# Patient Record
Sex: MALE | Race: WHITE | Employment: OTHER | URBAN - NONMETROPOLITAN AREA
[De-identification: names, ages, dates, MRNs, and addresses within clinical notes are randomized per-mention and may not be internally consistent; named-entity substitution may affect disease eponyms.]

---

## 2017-01-18 ENCOUNTER — OFFICE VISIT (OUTPATIENT)
Dept: CARDIOLOGY | Age: 62
End: 2017-01-18
Payer: MEDICARE

## 2017-01-18 VITALS
DIASTOLIC BLOOD PRESSURE: 74 MMHG | HEART RATE: 62 BPM | WEIGHT: 156 LBS | SYSTOLIC BLOOD PRESSURE: 138 MMHG | BODY MASS INDEX: 25.18 KG/M2

## 2017-01-18 DIAGNOSIS — R00.1 BRADYCARDIA: ICD-10-CM

## 2017-01-18 DIAGNOSIS — Z95.0 PACEMAKER: ICD-10-CM

## 2017-01-18 DIAGNOSIS — R06.02 SHORTNESS OF BREATH: Primary | ICD-10-CM

## 2017-01-18 PROCEDURE — 99213 OFFICE O/P EST LOW 20 MIN: CPT | Performed by: INTERNAL MEDICINE

## 2017-01-18 PROCEDURE — G8427 DOCREV CUR MEDS BY ELIG CLIN: HCPCS | Performed by: INTERNAL MEDICINE

## 2017-01-18 PROCEDURE — 4004F PT TOBACCO SCREEN RCVD TLK: CPT | Performed by: INTERNAL MEDICINE

## 2017-01-18 PROCEDURE — 93280 PM DEVICE PROGR EVAL DUAL: CPT | Performed by: INTERNAL MEDICINE

## 2017-01-18 PROCEDURE — 3017F COLORECTAL CA SCREEN DOC REV: CPT | Performed by: INTERNAL MEDICINE

## 2017-01-18 PROCEDURE — G8598 ASA/ANTIPLAT THER USED: HCPCS | Performed by: INTERNAL MEDICINE

## 2017-01-18 PROCEDURE — G8484 FLU IMMUNIZE NO ADMIN: HCPCS | Performed by: INTERNAL MEDICINE

## 2017-01-18 PROCEDURE — G8419 CALC BMI OUT NRM PARAM NOF/U: HCPCS | Performed by: INTERNAL MEDICINE

## 2017-04-20 ENCOUNTER — TELEPHONE (OUTPATIENT)
Dept: CARDIOLOGY | Age: 62
End: 2017-04-20

## 2017-05-03 DIAGNOSIS — I10 ESSENTIAL HYPERTENSION: ICD-10-CM

## 2017-05-03 RX ORDER — ATENOLOL 25 MG/1
TABLET ORAL
Qty: 30 TABLET | Refills: 5 | Status: SHIPPED | OUTPATIENT
Start: 2017-05-03 | End: 2017-11-13 | Stop reason: SDUPTHER

## 2017-05-11 DIAGNOSIS — R00.1 BRADYCARDIA: ICD-10-CM

## 2017-05-11 DIAGNOSIS — Z95.0 PACEMAKER: Primary | ICD-10-CM

## 2017-05-11 PROCEDURE — 93296 REM INTERROG EVL PM/IDS: CPT | Performed by: CLINICAL NURSE SPECIALIST

## 2017-05-11 PROCEDURE — 93294 REM INTERROG EVL PM/LDLS PM: CPT | Performed by: CLINICAL NURSE SPECIALIST

## 2017-07-06 ENCOUNTER — HOSPITAL ENCOUNTER (EMERGENCY)
Age: 62
Discharge: HOME | End: 2017-07-06
Attending: EMERGENCY MEDICINE
Payer: MEDICARE

## 2017-07-06 VITALS
RESPIRATION RATE: 18 BRPM | WEIGHT: 160 LBS | DIASTOLIC BLOOD PRESSURE: 90 MMHG | HEIGHT: 66 IN | TEMPERATURE: 97.9 F | SYSTOLIC BLOOD PRESSURE: 144 MMHG | HEART RATE: 65 BPM | BODY MASS INDEX: 25.71 KG/M2 | OXYGEN SATURATION: 98 %

## 2017-07-06 DIAGNOSIS — F12.10 MARIJUANA ABUSE: ICD-10-CM

## 2017-07-06 DIAGNOSIS — R55 NEAR SYNCOPE: Primary | ICD-10-CM

## 2017-07-06 DIAGNOSIS — F15.10 AMPHETAMINE ABUSE (HCC): ICD-10-CM

## 2017-07-06 LAB
ALBUMIN SERPL-MCNC: 3.7 G/DL (ref 3.5–5.2)
ALP BLD-CCNC: 34 U/L (ref 40–130)
ALT SERPL-CCNC: 102 U/L (ref 5–41)
AMPHETAMINE SCREEN, URINE: POSITIVE
ANION GAP SERPL CALCULATED.3IONS-SCNC: 16 MMOL/L (ref 7–19)
AST SERPL-CCNC: 147 U/L (ref 5–40)
BARBITURATE SCREEN URINE: NEGATIVE
BASOPHILS ABSOLUTE: 0 K/UL (ref 0–0.2)
BASOPHILS RELATIVE PERCENT: 0.7 % (ref 0–1)
BENZODIAZEPINE SCREEN, URINE: NEGATIVE
BILIRUB SERPL-MCNC: 0.4 MG/DL (ref 0.2–1.2)
BILIRUBIN URINE: NEGATIVE
BLOOD, URINE: NEGATIVE
BUN BLDV-MCNC: 17 MG/DL (ref 8–23)
CALCIUM SERPL-MCNC: 8.9 MG/DL (ref 8.8–10.2)
CANNABINOID SCREEN URINE: POSITIVE
CHLORIDE BLD-SCNC: 94 MMOL/L (ref 98–111)
CLARITY: CLEAR
CO2: 21 MMOL/L (ref 22–29)
COCAINE METABOLITE SCREEN URINE: NEGATIVE
COLOR: YELLOW
CREAT SERPL-MCNC: 0.6 MG/DL (ref 0.5–1.2)
EOSINOPHILS ABSOLUTE: 0.2 K/UL (ref 0–0.6)
EOSINOPHILS RELATIVE PERCENT: 3.6 % (ref 0–5)
ETHANOL: 21 MG/DL (ref 0–0.08)
GFR NON-AFRICAN AMERICAN: >60
GLUCOSE BLD-MCNC: 110 MG/DL (ref 74–109)
GLUCOSE URINE: NEGATIVE MG/DL
HCT VFR BLD CALC: 37.5 % (ref 42–52)
HEMOGLOBIN: 13.1 G/DL (ref 14–18)
INR BLD: 1 (ref 0.88–1.18)
KETONES, URINE: NEGATIVE MG/DL
LEUKOCYTE ESTERASE, URINE: NEGATIVE
LYMPHOCYTES ABSOLUTE: 1 K/UL (ref 1.1–4.5)
LYMPHOCYTES RELATIVE PERCENT: 24.4 % (ref 20–40)
Lab: ABNORMAL
MCH RBC QN AUTO: 32.3 PG (ref 27–31)
MCHC RBC AUTO-ENTMCNC: 34.9 G/DL (ref 33–37)
MCV RBC AUTO: 92.6 FL (ref 80–94)
MONOCYTES ABSOLUTE: 0.4 K/UL (ref 0–0.9)
MONOCYTES RELATIVE PERCENT: 10.2 % (ref 0–10)
NEUTROPHILS ABSOLUTE: 2.6 K/UL (ref 1.5–7.5)
NEUTROPHILS RELATIVE PERCENT: 60.9 % (ref 50–65)
NITRITE, URINE: NEGATIVE
OPIATE SCREEN URINE: NEGATIVE
PDW BLD-RTO: 12.3 % (ref 11.5–14.5)
PERFORMED ON: NORMAL
PH UA: 6.5
PLATELET # BLD: 137 K/UL (ref 130–400)
PMV BLD AUTO: 9.4 FL (ref 9.4–12.4)
POC TROPONIN I: 0 NG/ML (ref 0–0.08)
POTASSIUM SERPL-SCNC: 4.3 MMOL/L (ref 3.5–5)
PROTEIN UA: NEGATIVE MG/DL
PROTHROMBIN TIME: 13.1 SEC (ref 12–14.6)
RBC # BLD: 4.05 M/UL (ref 4.7–6.1)
SODIUM BLD-SCNC: 131 MMOL/L (ref 136–145)
SPECIFIC GRAVITY UA: 1.01
TOTAL CK: 87 U/L (ref 39–308)
TOTAL PROTEIN: 7.4 G/DL (ref 6.6–8.7)
UROBILINOGEN, URINE: 1 E.U./DL
WBC # BLD: 4.2 K/UL (ref 4.8–10.8)

## 2017-07-06 PROCEDURE — 80307 DRUG TEST PRSMV CHEM ANLYZR: CPT

## 2017-07-06 PROCEDURE — 80053 COMPREHEN METABOLIC PANEL: CPT

## 2017-07-06 PROCEDURE — G0480 DRUG TEST DEF 1-7 CLASSES: HCPCS

## 2017-07-06 PROCEDURE — 85025 COMPLETE CBC W/AUTO DIFF WBC: CPT

## 2017-07-06 PROCEDURE — 82550 ASSAY OF CK (CPK): CPT

## 2017-07-06 PROCEDURE — 93005 ELECTROCARDIOGRAM TRACING: CPT

## 2017-07-06 PROCEDURE — 84484 ASSAY OF TROPONIN QUANT: CPT

## 2017-07-06 PROCEDURE — 85610 PROTHROMBIN TIME: CPT

## 2017-07-06 PROCEDURE — 2580000003 HC RX 258: Performed by: EMERGENCY MEDICINE

## 2017-07-06 PROCEDURE — 36415 COLL VENOUS BLD VENIPUNCTURE: CPT

## 2017-07-06 PROCEDURE — 99282 EMERGENCY DEPT VISIT SF MDM: CPT | Performed by: EMERGENCY MEDICINE

## 2017-07-06 PROCEDURE — 99284 EMERGENCY DEPT VISIT MOD MDM: CPT

## 2017-07-06 RX ORDER — LISINOPRIL 20 MG/1
20 TABLET ORAL DAILY
COMMUNITY
End: 2017-07-20 | Stop reason: ALTCHOICE

## 2017-07-06 RX ORDER — 0.9 % SODIUM CHLORIDE 0.9 %
1000 INTRAVENOUS SOLUTION INTRAVENOUS ONCE
Status: COMPLETED | OUTPATIENT
Start: 2017-07-06 | End: 2017-07-06

## 2017-07-06 RX ADMIN — SODIUM CHLORIDE 1000 ML: 9 INJECTION, SOLUTION INTRAVENOUS at 21:04

## 2017-07-06 ASSESSMENT — ENCOUNTER SYMPTOMS
VOMITING: 0
DIARRHEA: 0
CHEST TIGHTNESS: 0
NAUSEA: 0
SHORTNESS OF BREATH: 0
WHEEZING: 0
SORE THROAT: 0
EYES NEGATIVE: 1
COUGH: 0
RHINORRHEA: 0
ABDOMINAL PAIN: 0

## 2017-07-10 LAB
EKG P AXIS: NORMAL DEGREES
EKG P-R INTERVAL: NORMAL MS
EKG Q-T INTERVAL: 402 MS
EKG QRS DURATION: 94 MS
EKG QTC CALCULATION (BAZETT): 410 MS
EKG T AXIS: 66 DEGREES

## 2017-07-20 ENCOUNTER — OFFICE VISIT (OUTPATIENT)
Dept: CARDIOLOGY | Age: 62
End: 2017-07-20
Payer: MEDICARE

## 2017-07-20 VITALS
WEIGHT: 152 LBS | DIASTOLIC BLOOD PRESSURE: 68 MMHG | SYSTOLIC BLOOD PRESSURE: 122 MMHG | HEIGHT: 63 IN | HEART RATE: 54 BPM | BODY MASS INDEX: 26.93 KG/M2

## 2017-07-20 DIAGNOSIS — Z72.0 TOBACCO ABUSE: ICD-10-CM

## 2017-07-20 DIAGNOSIS — Z95.0 PACEMAKER: Primary | ICD-10-CM

## 2017-07-20 DIAGNOSIS — R55 NEAR SYNCOPE: ICD-10-CM

## 2017-07-20 DIAGNOSIS — R00.1 BRADYCARDIA: ICD-10-CM

## 2017-07-20 PROCEDURE — G8598 ASA/ANTIPLAT THER USED: HCPCS | Performed by: CLINICAL NURSE SPECIALIST

## 2017-07-20 PROCEDURE — G8427 DOCREV CUR MEDS BY ELIG CLIN: HCPCS | Performed by: CLINICAL NURSE SPECIALIST

## 2017-07-20 PROCEDURE — 99213 OFFICE O/P EST LOW 20 MIN: CPT | Performed by: CLINICAL NURSE SPECIALIST

## 2017-07-20 PROCEDURE — 4004F PT TOBACCO SCREEN RCVD TLK: CPT | Performed by: CLINICAL NURSE SPECIALIST

## 2017-07-20 PROCEDURE — 93280 PM DEVICE PROGR EVAL DUAL: CPT | Performed by: CLINICAL NURSE SPECIALIST

## 2017-07-20 PROCEDURE — 3017F COLORECTAL CA SCREEN DOC REV: CPT | Performed by: CLINICAL NURSE SPECIALIST

## 2017-07-20 PROCEDURE — G8419 CALC BMI OUT NRM PARAM NOF/U: HCPCS | Performed by: CLINICAL NURSE SPECIALIST

## 2017-08-24 ENCOUNTER — HOSPITAL ENCOUNTER (OUTPATIENT)
Dept: GENERAL RADIOLOGY | Age: 62
Discharge: HOME | End: 2017-08-24
Payer: MEDICARE

## 2017-08-24 DIAGNOSIS — M25.512 LEFT SHOULDER PAIN, UNSPECIFIED CHRONICITY: ICD-10-CM

## 2017-08-24 PROCEDURE — 73030 X-RAY EXAM OF SHOULDER: CPT

## 2017-10-23 ENCOUNTER — TELEPHONE (OUTPATIENT)
Dept: CARDIOLOGY | Age: 62
End: 2017-10-23

## 2017-10-23 DIAGNOSIS — R00.1 BRADYCARDIA: ICD-10-CM

## 2017-10-23 DIAGNOSIS — Z95.0 PACEMAKER: Primary | ICD-10-CM

## 2017-10-23 PROCEDURE — 93296 REM INTERROG EVL PM/IDS: CPT | Performed by: NURSE PRACTITIONER

## 2017-10-23 PROCEDURE — 93294 REM INTERROG EVL PM/LDLS PM: CPT | Performed by: NURSE PRACTITIONER

## 2017-11-13 DIAGNOSIS — I10 ESSENTIAL HYPERTENSION: ICD-10-CM

## 2017-11-13 RX ORDER — ATENOLOL 25 MG/1
TABLET ORAL
Qty: 30 TABLET | Refills: 5 | Status: SHIPPED | OUTPATIENT
Start: 2017-11-13 | End: 2018-05-12 | Stop reason: SDUPTHER

## 2018-02-01 ENCOUNTER — OFFICE VISIT (OUTPATIENT)
Dept: CARDIOLOGY | Age: 63
End: 2018-02-01
Payer: MEDICARE

## 2018-02-01 VITALS
WEIGHT: 158 LBS | BODY MASS INDEX: 27.99 KG/M2 | HEART RATE: 65 BPM | DIASTOLIC BLOOD PRESSURE: 72 MMHG | SYSTOLIC BLOOD PRESSURE: 130 MMHG

## 2018-02-01 DIAGNOSIS — R00.1 BRADYCARDIA: ICD-10-CM

## 2018-02-01 DIAGNOSIS — I10 ESSENTIAL HYPERTENSION: Primary | ICD-10-CM

## 2018-02-01 DIAGNOSIS — Z95.0 PACEMAKER: ICD-10-CM

## 2018-02-01 PROCEDURE — 93280 PM DEVICE PROGR EVAL DUAL: CPT | Performed by: INTERNAL MEDICINE

## 2018-02-01 PROCEDURE — G8484 FLU IMMUNIZE NO ADMIN: HCPCS | Performed by: INTERNAL MEDICINE

## 2018-02-01 PROCEDURE — 4004F PT TOBACCO SCREEN RCVD TLK: CPT | Performed by: INTERNAL MEDICINE

## 2018-02-01 PROCEDURE — 99214 OFFICE O/P EST MOD 30 MIN: CPT | Performed by: INTERNAL MEDICINE

## 2018-02-01 PROCEDURE — G8598 ASA/ANTIPLAT THER USED: HCPCS | Performed by: INTERNAL MEDICINE

## 2018-02-01 PROCEDURE — 3017F COLORECTAL CA SCREEN DOC REV: CPT | Performed by: INTERNAL MEDICINE

## 2018-02-01 PROCEDURE — G8427 DOCREV CUR MEDS BY ELIG CLIN: HCPCS | Performed by: INTERNAL MEDICINE

## 2018-02-01 PROCEDURE — G8419 CALC BMI OUT NRM PARAM NOF/U: HCPCS | Performed by: INTERNAL MEDICINE

## 2018-02-01 RX ORDER — DICLOFENAC SODIUM 75 MG/1
75 TABLET, DELAYED RELEASE ORAL 2 TIMES DAILY
Refills: 2 | COMMUNITY
Start: 2018-01-25 | End: 2018-03-29

## 2018-02-14 ENCOUNTER — HOSPITAL ENCOUNTER (INPATIENT)
Age: 63
LOS: 3 days | Discharge: HOME | DRG: 247 | End: 2018-02-18
Attending: EMERGENCY MEDICINE | Admitting: INTERNAL MEDICINE
Payer: MEDICARE

## 2018-02-14 DIAGNOSIS — I21.4 NSTEMI (NON-ST ELEVATED MYOCARDIAL INFARCTION) (HCC): Primary | ICD-10-CM

## 2018-02-14 DIAGNOSIS — Z72.0 TOBACCO ABUSE: ICD-10-CM

## 2018-02-14 DIAGNOSIS — Z95.0 PACEMAKER: ICD-10-CM

## 2018-02-14 DIAGNOSIS — I20.0 UNSTABLE ANGINA (HCC): ICD-10-CM

## 2018-02-14 DIAGNOSIS — I10 ESSENTIAL HYPERTENSION: ICD-10-CM

## 2018-02-14 LAB
PERFORMED ON: ABNORMAL
POC TROPONIN I: 0.91 NG/ML (ref 0–0.08)

## 2018-02-14 PROCEDURE — 99285 EMERGENCY DEPT VISIT HI MDM: CPT

## 2018-02-14 RX ORDER — NITROGLYCERIN 20 MG/100ML
INJECTION INTRAVENOUS
Status: COMPLETED
Start: 2018-02-14 | End: 2018-02-15

## 2018-02-14 ASSESSMENT — PAIN DESCRIPTION - PAIN TYPE: TYPE: ACUTE PAIN

## 2018-02-14 ASSESSMENT — PAIN DESCRIPTION - LOCATION: LOCATION: CHEST

## 2018-02-14 ASSESSMENT — PAIN SCALES - GENERAL: PAINLEVEL_OUTOF10: 4

## 2018-02-14 ASSESSMENT — PAIN DESCRIPTION - FREQUENCY: FREQUENCY: CONTINUOUS

## 2018-02-15 ENCOUNTER — APPOINTMENT (OUTPATIENT)
Dept: GENERAL RADIOLOGY | Age: 63
DRG: 247 | End: 2018-02-15
Payer: MEDICARE

## 2018-02-15 PROBLEM — I25.10 CAD (CORONARY ARTERY DISEASE): Status: ACTIVE | Noted: 2018-02-15

## 2018-02-15 PROBLEM — I21.4 NSTEMI (NON-ST ELEVATED MYOCARDIAL INFARCTION) (HCC): Status: ACTIVE | Noted: 2018-02-15

## 2018-02-15 PROBLEM — I49.5 SINOATRIAL NODE DYSFUNCTION (HCC): Status: ACTIVE | Noted: 2018-02-15

## 2018-02-15 LAB
ALBUMIN SERPL-MCNC: 3.9 G/DL (ref 3.5–5.2)
ALP BLD-CCNC: 50 U/L (ref 40–130)
ALT SERPL-CCNC: 125 U/L (ref 5–41)
ANION GAP SERPL CALCULATED.3IONS-SCNC: 13 MMOL/L (ref 7–19)
APTT: 102 SEC (ref 26–36.2)
APTT: 71.1 SEC (ref 26–36.2)
APTT: >200 SEC (ref 26–36.2)
AST SERPL-CCNC: 180 U/L (ref 5–40)
BASOPHILS ABSOLUTE: 0 K/UL (ref 0–0.2)
BASOPHILS RELATIVE PERCENT: 1 % (ref 0–1)
BILIRUB SERPL-MCNC: 0.5 MG/DL (ref 0.2–1.2)
BUN BLDV-MCNC: 21 MG/DL (ref 8–23)
CALCIUM SERPL-MCNC: 9.1 MG/DL (ref 8.8–10.2)
CHLORIDE BLD-SCNC: 100 MMOL/L (ref 98–111)
CO2: 24 MMOL/L (ref 22–29)
CREAT SERPL-MCNC: 0.8 MG/DL (ref 0.5–1.2)
EKG P AXIS: 79 DEGREES
EKG P-R INTERVAL: 212 MS
EKG Q-T INTERVAL: 456 MS
EKG QRS DURATION: 86 MS
EKG QTC CALCULATION (BAZETT): 459 MS
EKG T AXIS: 98 DEGREES
EOSINOPHILS ABSOLUTE: 0.1 K/UL (ref 0–0.6)
EOSINOPHILS RELATIVE PERCENT: 3.5 % (ref 0–5)
GFR NON-AFRICAN AMERICAN: >60
GLUCOSE BLD-MCNC: 110 MG/DL (ref 74–109)
HCT VFR BLD CALC: 44 % (ref 42–52)
HEMOGLOBIN: 14.7 G/DL (ref 14–18)
INR BLD: 1.26 (ref 0.88–1.18)
LIPASE: 51 U/L (ref 13–60)
LYMPHOCYTES ABSOLUTE: 1.2 K/UL (ref 1.1–4.5)
LYMPHOCYTES RELATIVE PERCENT: 28.9 % (ref 20–40)
MCH RBC QN AUTO: 31.2 PG (ref 27–31)
MCHC RBC AUTO-ENTMCNC: 33.4 G/DL (ref 33–37)
MCV RBC AUTO: 93.4 FL (ref 80–94)
MONOCYTES ABSOLUTE: 0.5 K/UL (ref 0–0.9)
MONOCYTES RELATIVE PERCENT: 12.4 % (ref 0–10)
NEUTROPHILS ABSOLUTE: 2.2 K/UL (ref 1.5–7.5)
NEUTROPHILS RELATIVE PERCENT: 54 % (ref 50–65)
PDW BLD-RTO: 12.3 % (ref 11.5–14.5)
PLATELET # BLD: 120 K/UL (ref 130–400)
PMV BLD AUTO: 10.1 FL (ref 9.4–12.4)
POC ACT LR: 180 SEC
POTASSIUM SERPL-SCNC: 4.7 MMOL/L (ref 3.5–5)
PRO-BNP: 5567 PG/ML (ref 0–900)
PROTHROMBIN TIME: 15.7 SEC (ref 12–14.6)
RBC # BLD: 4.71 M/UL (ref 4.7–6.1)
SODIUM BLD-SCNC: 137 MMOL/L (ref 136–145)
TOTAL PROTEIN: 7.7 G/DL (ref 6.6–8.7)
TROPONIN: 0.29 NG/ML (ref 0–0.03)
TROPONIN: 0.35 NG/ML (ref 0–0.03)
TROPONIN: 0.41 NG/ML (ref 0–0.03)
TROPONIN: 0.45 NG/ML (ref 0–0.03)
TROPONIN: 0.48 NG/ML (ref 0–0.03)
TROPONIN: 0.49 NG/ML (ref 0–0.03)
WBC # BLD: 4 K/UL (ref 4.8–10.8)

## 2018-02-15 PROCEDURE — 2580000003 HC RX 258: Performed by: INTERNAL MEDICINE

## 2018-02-15 PROCEDURE — 93005 ELECTROCARDIOGRAM TRACING: CPT

## 2018-02-15 PROCEDURE — 93458 L HRT ARTERY/VENTRICLE ANGIO: CPT | Performed by: INTERNAL MEDICINE

## 2018-02-15 PROCEDURE — 92928 PRQ TCAT PLMT NTRAC ST 1 LES: CPT | Performed by: INTERNAL MEDICINE

## 2018-02-15 PROCEDURE — 2720000000 HC MISC SURG SUPPLY STERILE $0-50

## 2018-02-15 PROCEDURE — 027135Z DILATION OF CORONARY ARTERY, TWO ARTERIES WITH TWO DRUG-ELUTING INTRALUMINAL DEVICES, PERCUTANEOUS APPROACH: ICD-10-PCS | Performed by: INTERNAL MEDICINE

## 2018-02-15 PROCEDURE — 84484 ASSAY OF TROPONIN QUANT: CPT

## 2018-02-15 PROCEDURE — 92929 HC PRQ CARD STENT W/ANGIO ADDL: CPT | Performed by: INTERNAL MEDICINE

## 2018-02-15 PROCEDURE — 6370000000 HC RX 637 (ALT 250 FOR IP): Performed by: EMERGENCY MEDICINE

## 2018-02-15 PROCEDURE — 83690 ASSAY OF LIPASE: CPT

## 2018-02-15 PROCEDURE — 36415 COLL VENOUS BLD VENIPUNCTURE: CPT

## 2018-02-15 PROCEDURE — 6370000000 HC RX 637 (ALT 250 FOR IP)

## 2018-02-15 PROCEDURE — 85347 COAGULATION TIME ACTIVATED: CPT

## 2018-02-15 PROCEDURE — 87070 CULTURE OTHR SPECIMN AEROBIC: CPT

## 2018-02-15 PROCEDURE — C1760 CLOSURE DEV, VASC: HCPCS

## 2018-02-15 PROCEDURE — C1874 STENT, COATED/COV W/DEL SYS: HCPCS

## 2018-02-15 PROCEDURE — 85610 PROTHROMBIN TIME: CPT

## 2018-02-15 PROCEDURE — 83880 ASSAY OF NATRIURETIC PEPTIDE: CPT

## 2018-02-15 PROCEDURE — 71045 X-RAY EXAM CHEST 1 VIEW: CPT

## 2018-02-15 PROCEDURE — B41F1ZZ FLUOROSCOPY OF RIGHT LOWER EXTREMITY ARTERIES USING LOW OSMOLAR CONTRAST: ICD-10-PCS | Performed by: INTERNAL MEDICINE

## 2018-02-15 PROCEDURE — C1887 CATHETER, GUIDING: HCPCS

## 2018-02-15 PROCEDURE — B2151ZZ FLUOROSCOPY OF LEFT HEART USING LOW OSMOLAR CONTRAST: ICD-10-PCS | Performed by: INTERNAL MEDICINE

## 2018-02-15 PROCEDURE — 6370000000 HC RX 637 (ALT 250 FOR IP): Performed by: INTERNAL MEDICINE

## 2018-02-15 PROCEDURE — 85025 COMPLETE CBC W/AUTO DIFF WBC: CPT

## 2018-02-15 PROCEDURE — 6360000002 HC RX W HCPCS

## 2018-02-15 PROCEDURE — 2100000000 HC CCU R&B

## 2018-02-15 PROCEDURE — 99291 CRITICAL CARE FIRST HOUR: CPT | Performed by: EMERGENCY MEDICINE

## 2018-02-15 PROCEDURE — 2500000003 HC RX 250 WO HCPCS

## 2018-02-15 PROCEDURE — 2500000003 HC RX 250 WO HCPCS: Performed by: INTERNAL MEDICINE

## 2018-02-15 PROCEDURE — 92929 PR PRQ TRLUML CORONARY STENT W/ANGIO ADDL ART/BRNCH: CPT | Performed by: INTERNAL MEDICINE

## 2018-02-15 PROCEDURE — 85730 THROMBOPLASTIN TIME PARTIAL: CPT

## 2018-02-15 PROCEDURE — 4A023N7 MEASUREMENT OF CARDIAC SAMPLING AND PRESSURE, LEFT HEART, PERCUTANEOUS APPROACH: ICD-10-PCS | Performed by: INTERNAL MEDICINE

## 2018-02-15 PROCEDURE — 99223 1ST HOSP IP/OBS HIGH 75: CPT | Performed by: INTERNAL MEDICINE

## 2018-02-15 PROCEDURE — 80053 COMPREHEN METABOLIC PANEL: CPT

## 2018-02-15 PROCEDURE — 6360000002 HC RX W HCPCS: Performed by: INTERNAL MEDICINE

## 2018-02-15 PROCEDURE — 2720000001 HC MISC SURG SUPPLY STERILE $51-500

## 2018-02-15 PROCEDURE — 2709999900 HC NON-CHARGEABLE SUPPLY

## 2018-02-15 PROCEDURE — C1725 CATH, TRANSLUMIN NON-LASER: HCPCS

## 2018-02-15 PROCEDURE — C1769 GUIDE WIRE: HCPCS

## 2018-02-15 PROCEDURE — B2111ZZ FLUOROSCOPY OF MULTIPLE CORONARY ARTERIES USING LOW OSMOLAR CONTRAST: ICD-10-PCS | Performed by: INTERNAL MEDICINE

## 2018-02-15 RX ORDER — HEPARIN SODIUM 1000 [USP'U]/ML
30 INJECTION, SOLUTION INTRAVENOUS; SUBCUTANEOUS PRN
Status: DISCONTINUED | OUTPATIENT
Start: 2018-02-15 | End: 2018-02-15

## 2018-02-15 RX ORDER — ONDANSETRON 2 MG/ML
4 INJECTION INTRAMUSCULAR; INTRAVENOUS EVERY 6 HOURS PRN
Status: DISCONTINUED | OUTPATIENT
Start: 2018-02-15 | End: 2018-02-18 | Stop reason: HOSPADM

## 2018-02-15 RX ORDER — IBUPROFEN 400 MG/1
600 TABLET ORAL EVERY 8 HOURS SCHEDULED
Status: DISCONTINUED | OUTPATIENT
Start: 2018-02-15 | End: 2018-02-18 | Stop reason: HOSPADM

## 2018-02-15 RX ORDER — ATORVASTATIN CALCIUM 40 MG/1
40 TABLET, FILM COATED ORAL NIGHTLY
Status: DISCONTINUED | OUTPATIENT
Start: 2018-02-15 | End: 2018-02-18 | Stop reason: HOSPADM

## 2018-02-15 RX ORDER — ASPIRIN 81 MG/1
81 TABLET, CHEWABLE ORAL DAILY
Status: DISCONTINUED | OUTPATIENT
Start: 2018-02-15 | End: 2018-02-18 | Stop reason: HOSPADM

## 2018-02-15 RX ORDER — SODIUM CHLORIDE 0.9 % (FLUSH) 0.9 %
10 SYRINGE (ML) INJECTION PRN
Status: DISCONTINUED | OUTPATIENT
Start: 2018-02-15 | End: 2018-02-18 | Stop reason: HOSPADM

## 2018-02-15 RX ORDER — ASPIRIN 325 MG
325 TABLET ORAL DAILY
Status: DISCONTINUED | OUTPATIENT
Start: 2018-02-15 | End: 2018-02-15

## 2018-02-15 RX ORDER — PRASUGREL 10 MG/1
10 TABLET, FILM COATED ORAL DAILY
Status: DISCONTINUED | OUTPATIENT
Start: 2018-02-16 | End: 2018-02-16

## 2018-02-15 RX ORDER — HEPARIN SODIUM 10000 [USP'U]/100ML
12 INJECTION, SOLUTION INTRAVENOUS CONTINUOUS
Status: DISCONTINUED | OUTPATIENT
Start: 2018-02-15 | End: 2018-02-15

## 2018-02-15 RX ORDER — HEPARIN SODIUM 1000 [USP'U]/ML
60 INJECTION, SOLUTION INTRAVENOUS; SUBCUTANEOUS PRN
Status: DISCONTINUED | OUTPATIENT
Start: 2018-02-15 | End: 2018-02-15

## 2018-02-15 RX ORDER — ASPIRIN 81 MG/1
324 TABLET, CHEWABLE ORAL ONCE
Status: COMPLETED | OUTPATIENT
Start: 2018-02-15 | End: 2018-02-15

## 2018-02-15 RX ORDER — NITROGLYCERIN 20 MG/100ML
5 INJECTION INTRAVENOUS CONTINUOUS
Status: DISCONTINUED | OUTPATIENT
Start: 2018-02-15 | End: 2018-02-15

## 2018-02-15 RX ORDER — SODIUM CHLORIDE 9 MG/ML
INJECTION, SOLUTION INTRAVENOUS CONTINUOUS
Status: ACTIVE | OUTPATIENT
Start: 2018-02-15 | End: 2018-02-15

## 2018-02-15 RX ORDER — ACETAMINOPHEN 325 MG/1
650 TABLET ORAL EVERY 4 HOURS PRN
Status: DISCONTINUED | OUTPATIENT
Start: 2018-02-15 | End: 2018-02-18 | Stop reason: HOSPADM

## 2018-02-15 RX ORDER — SODIUM CHLORIDE 0.9 % (FLUSH) 0.9 %
10 SYRINGE (ML) INJECTION EVERY 12 HOURS SCHEDULED
Status: DISCONTINUED | OUTPATIENT
Start: 2018-02-15 | End: 2018-02-18 | Stop reason: HOSPADM

## 2018-02-15 RX ORDER — HEPARIN SODIUM 5000 [USP'U]/ML
INJECTION, SOLUTION INTRAVENOUS; SUBCUTANEOUS
Status: COMPLETED
Start: 2018-02-15 | End: 2018-02-15

## 2018-02-15 RX ORDER — DICLOFENAC SODIUM 75 MG/1
75 TABLET, DELAYED RELEASE ORAL 2 TIMES DAILY
Status: DISCONTINUED | OUTPATIENT
Start: 2018-02-15 | End: 2018-02-15 | Stop reason: CLARIF

## 2018-02-15 RX ORDER — NITROGLYCERIN 0.4 MG/1
0.4 TABLET SUBLINGUAL EVERY 5 MIN PRN
Status: DISCONTINUED | OUTPATIENT
Start: 2018-02-15 | End: 2018-02-15

## 2018-02-15 RX ORDER — ATENOLOL 25 MG/1
25 TABLET ORAL DAILY
Status: DISCONTINUED | OUTPATIENT
Start: 2018-02-15 | End: 2018-02-18 | Stop reason: HOSPADM

## 2018-02-15 RX ADMIN — NITROGLYCERIN 5 MCG/MIN: 20 INJECTION INTRAVENOUS at 01:17

## 2018-02-15 RX ADMIN — HEPARIN SODIUM AND DEXTROSE 12 UNITS/KG/HR: 10000; 5 INJECTION INTRAVENOUS at 01:18

## 2018-02-15 RX ADMIN — Medication 10 ML: at 20:01

## 2018-02-15 RX ADMIN — HEPARIN SODIUM 5000 UNITS: 5000 INJECTION INTRAVENOUS; SUBCUTANEOUS at 00:10

## 2018-02-15 RX ADMIN — ASPIRIN 81 MG CHEWABLE TABLET 324 MG: 81 TABLET CHEWABLE at 00:06

## 2018-02-15 RX ADMIN — ATENOLOL 25 MG: 25 TABLET ORAL at 19:59

## 2018-02-15 RX ADMIN — ATORVASTATIN CALCIUM 40 MG: 40 TABLET, FILM COATED ORAL at 19:59

## 2018-02-15 RX ADMIN — IBUPROFEN 600 MG: 400 TABLET, FILM COATED ORAL at 19:59

## 2018-02-15 RX ADMIN — NITROGLYCERIN 50000 MCG: 20 INJECTION INTRAVENOUS at 00:11

## 2018-02-15 ASSESSMENT — ENCOUNTER SYMPTOMS
SHORTNESS OF BREATH: 1
NAUSEA: 0
DIARRHEA: 0
COUGH: 0
VOMITING: 0
ABDOMINAL PAIN: 0

## 2018-02-15 ASSESSMENT — PAIN DESCRIPTION - PAIN TYPE: TYPE: CHRONIC PAIN

## 2018-02-15 ASSESSMENT — PAIN SCALES - GENERAL
PAINLEVEL_OUTOF10: 1
PAINLEVEL_OUTOF10: 0

## 2018-02-15 ASSESSMENT — PAIN DESCRIPTION - LOCATION: LOCATION: SHOULDER

## 2018-02-15 NOTE — ED PROVIDER NOTES
(!) 145/91 97.6 °F (36.4 °C) Oral 61 18 96 % 5' 6\" (1.676 m) 158 lb (71.7 kg)       Physical Exam   Constitutional: He is oriented to person, place, and time. He appears well-developed and well-nourished. No distress. Thin no acute distress laying in bed talking normally   HENT:   Head: Normocephalic and atraumatic. Eyes: Pupils are equal, round, and reactive to light. Neck: Normal range of motion. Neck supple. Cardiovascular: Normal rate, regular rhythm and normal heart sounds. Pulmonary/Chest: Effort normal and breath sounds normal. No respiratory distress. He has no wheezes. Pacer L upper chest   Abdominal: Soft. Bowel sounds are normal. He exhibits no distension. There is no tenderness. There is no rebound. Musculoskeletal: Normal range of motion. He exhibits no edema or tenderness. Neurological: He is alert and oriented to person, place, and time. No cranial nerve deficit or sensory deficit. GCS eye subscore is 4. GCS verbal subscore is 5. GCS motor subscore is 6. Skin: Skin is warm and dry. He is not diaphoretic. Many tattoos, psoriatic arthritis of the hands as well as rash of psoriasis on the arms especially. Psychiatric: He has a normal mood and affect. His behavior is normal.   Nursing note and vitals reviewed. DIAGNOSTIC RESULTS     EKG: All EKG's are interpreted by the Emergency Department Physician who either signs or Co-signs this chart in the absence of a cardiologist.  EKG #1 shows atrially paced rhythm at rate 63 there is biphasic T waves V2 through V5 with T-wave inversion 1 and aVL with pacer spikes with good capture. Prior EKG does not show any changes. EKG #2 shows similar pattern. With biphasic waves in V2 through V5 concerning for [facility].       RADIOLOGY:   Non-plain film images such as CT, Ultrasound and MRI are read by the radiologist. Plain radiographic images are visualized and preliminarily interpreted by the emergency physician with the below

## 2018-02-15 NOTE — PLAN OF CARE
Problem: Discharge Planning:  Goal: Participates in care planning  Participates in care planning  Outcome: Ongoing    Goal: Discharged to appropriate level of care  Discharged to appropriate level of care  Outcome: Ongoing      Problem: Tissue Perfusion - Cardiopulmonary, Altered:  Goal: Absence of angina  Absence of angina  Outcome: Ongoing    Goal: Hemodynamic stability will improve  Hemodynamic stability will improve  Outcome: Met This Shift

## 2018-02-15 NOTE — H&P
[facility]      History and Physical       Date of Admission:  2/14/2018 11:48 PM    Date of Initially Being Seen / Consultation:  2/15/18    Cardiologist:  Cleve Covarrubias MD     Cardiology Attending: [facility] Attending: ARIN     PCP:  JEFF Post    Reason for Consultation or Admission / Chief Complaint:  Chest discomfort    SUBJECTIVE AND HISTORY OF PRESENT ILLNESS:    Source of the history:  Patient, family, previous inpatient and outpatient records in [place] [facility]    Faisal Dawson is a 58 y.o. male who presents to [facility] ED / CCU with symptoms / signs / problem or diagnosis of chest discomfort. The symptoms began last night. The chest discomfort began at rest and lasted several hours. It was worse with activity. There was partial relief with rest.  The chest discomfort was described as pressure, fullness and tightness. He reports it as indigestion. It was not crushing. It was it located in the central chest with radiation to the back, throat and left shoulder. It was described as mild. There were no associated manifestations such as nausea, vomiting, diaphoresis, presyncope, syncope, dizzyness, weakness, cold sweats, or shortness of air. When being examined this morning, he has had no symptoms of exertional chest discomfort, unusual or change in shortness of air, presyncope or syncope.         Family present:  yes      CARDIAC RISK PROFILE:    Risk Factor Yes / No / Unknown       Gender Male   Cigarette Use Yes:    Family History of Cardiovascular Disease Yes: diabetes, heart disease, high blood pressure, diabetes, stroke    Diabetes Mellitus no   Hypercholesteremia no   Hypertension no          Cardiac Specific Problems:    Specialty Problems        Cardiology Problems    Carotid artery stenosis        CAD (coronary artery disease)        Sinoatrial node dysfunction (HCC)                PRIOR CARDIAC PROBLEM LIST  (IF APPLICABLE):    6/0/4720  Echo  Normal LVFX  2/23/2013  Echo  Normal LVFX, RVSP 55 mg  2/15/18 ACS ALETHA RISK Score 3 (angina, + troponin, cigarette, family history of CAD, ASA), AUC indication 3, AUC score 8  2/15/18  Cath  90% proximal LAD, 2.25 x 38 SP, diag 2.5 x 15 SP, anterior lateral hypokinesis, EF 45%    3/5/13  loop recorder  5/22/2013  Dual chamber PPM    Past Medical History:    Past Medical History:   Diagnosis Date    Bradycardia     3/5/13  loop recorder    Carotid artery stenosis     Hypoglycemia     Near syncope     Osteoarthritis     Pacemaker 5/22/2013    loop recorder removed    S/P carotid endarterectomy     right internal carotid    Sinoatrial node dysfunction (HCC) 2/15/2018    3/5/13  loop recorder 5/22/2013  Dual chamber PPM    Tobacco abuse          Past Surgical History:    Past Surgical History:   Procedure Laterality Date    PACEMAKER INSERTION  5/22/2013    VASCULAR SURGERY  2-27-13 [facility]    Right Carotid endarterectomy, eversion technique with completion duplex US         Home Medications:   Prior to Admission medications    Medication Sig Start Date End Date Taking? Authorizing Provider   diclofenac (VOLTAREN) 75 MG EC tablet 75 mg 2 times daily 1/25/18  Yes Historical Provider, MD   atenolol (TENORMIN) 25 MG tablet TAKE 1 TABLET BY MOUTH DAILY 11/13/17  Yes JEFF Chapa        Facility Administered Medications:    sodium chloride flush  10 mL Intravenous 2 times per day    aspirin  81 mg Oral Daily    aspirin  325 mg Oral Daily    [START ON 2/16/2018] influenza virus vaccine  0.5 mL Intramuscular Once       Allergies:  Codeine     Social History:       Social History     Social History    Marital status:      Spouse name: N/A    Number of children: N/A    Years of education: N/A     Occupational History    Not on file.      Social History Main Topics    Smoking status: Current Every Day Smoker     Packs/day: 1.50     Years: 50.00     Types: Cigarettes    Smokeless tobacco: Former User

## 2018-02-16 LAB
BASOPHILS ABSOLUTE: 0 K/UL (ref 0–0.2)
BASOPHILS RELATIVE PERCENT: 0.4 % (ref 0–1)
CHOLESTEROL, TOTAL: 131 MG/DL (ref 160–199)
EOSINOPHILS ABSOLUTE: 0.1 K/UL (ref 0–0.6)
EOSINOPHILS RELATIVE PERCENT: 2 % (ref 0–5)
HCT VFR BLD CALC: 35.1 % (ref 42–52)
HDLC SERPL-MCNC: 23 MG/DL (ref 55–121)
HEMOGLOBIN: 11.8 G/DL (ref 14–18)
LDL CHOLESTEROL CALCULATED: 68 MG/DL
LYMPHOCYTES ABSOLUTE: 1.3 K/UL (ref 1.1–4.5)
LYMPHOCYTES RELATIVE PERCENT: 28.5 % (ref 20–40)
MCH RBC QN AUTO: 31.5 PG (ref 27–31)
MCHC RBC AUTO-ENTMCNC: 33.6 G/DL (ref 33–37)
MCV RBC AUTO: 93.6 FL (ref 80–94)
MONOCYTES ABSOLUTE: 0.5 K/UL (ref 0–0.9)
MONOCYTES RELATIVE PERCENT: 9.9 % (ref 0–10)
MRSA CULTURE ONLY: ABNORMAL
MRSA CULTURE ONLY: ABNORMAL
NEUTROPHILS ABSOLUTE: 2.7 K/UL (ref 1.5–7.5)
NEUTROPHILS RELATIVE PERCENT: 58.8 % (ref 50–65)
ORGANISM: ABNORMAL
PDW BLD-RTO: 12.3 % (ref 11.5–14.5)
PLATELET # BLD: 105 K/UL (ref 130–400)
PMV BLD AUTO: 10.5 FL (ref 9.4–12.4)
RBC # BLD: 3.75 M/UL (ref 4.7–6.1)
TRIGL SERPL-MCNC: 201 MG/DL (ref 0–149)
TROPONIN: 0.52 NG/ML (ref 0–0.03)
WBC # BLD: 4.5 K/UL (ref 4.8–10.8)

## 2018-02-16 PROCEDURE — 6370000000 HC RX 637 (ALT 250 FOR IP): Performed by: INTERNAL MEDICINE

## 2018-02-16 PROCEDURE — 3E0234Z INTRODUCTION OF SERUM, TOXOID AND VACCINE INTO MUSCLE, PERCUTANEOUS APPROACH: ICD-10-PCS | Performed by: INTERNAL MEDICINE

## 2018-02-16 PROCEDURE — 2580000003 HC RX 258: Performed by: INTERNAL MEDICINE

## 2018-02-16 PROCEDURE — 80061 LIPID PANEL: CPT

## 2018-02-16 PROCEDURE — 85025 COMPLETE CBC W/AUTO DIFF WBC: CPT

## 2018-02-16 PROCEDURE — 93005 ELECTROCARDIOGRAM TRACING: CPT

## 2018-02-16 PROCEDURE — G0008 ADMIN INFLUENZA VIRUS VAC: HCPCS | Performed by: INTERNAL MEDICINE

## 2018-02-16 PROCEDURE — 36415 COLL VENOUS BLD VENIPUNCTURE: CPT

## 2018-02-16 PROCEDURE — 90686 IIV4 VACC NO PRSV 0.5 ML IM: CPT | Performed by: INTERNAL MEDICINE

## 2018-02-16 PROCEDURE — 84484 ASSAY OF TROPONIN QUANT: CPT

## 2018-02-16 PROCEDURE — 99232 SBSQ HOSP IP/OBS MODERATE 35: CPT | Performed by: INTERNAL MEDICINE

## 2018-02-16 PROCEDURE — 6360000002 HC RX W HCPCS: Performed by: INTERNAL MEDICINE

## 2018-02-16 PROCEDURE — 2100000000 HC CCU R&B

## 2018-02-16 RX ORDER — LISINOPRIL 2.5 MG/1
2.5 TABLET ORAL DAILY
Status: DISCONTINUED | OUTPATIENT
Start: 2018-02-16 | End: 2018-02-18 | Stop reason: HOSPADM

## 2018-02-16 RX ORDER — ATORVASTATIN CALCIUM 40 MG/1
40 TABLET, FILM COATED ORAL NIGHTLY
Qty: 30 TABLET | Refills: 3 | Status: CANCELLED | OUTPATIENT
Start: 2018-02-16

## 2018-02-16 RX ORDER — ASPIRIN 81 MG/1
81 TABLET, CHEWABLE ORAL DAILY
Qty: 30 TABLET | Refills: 3 | Status: CANCELLED | OUTPATIENT
Start: 2018-02-17

## 2018-02-16 RX ORDER — CLOPIDOGREL BISULFATE 75 MG/1
75 TABLET ORAL DAILY
Status: DISCONTINUED | OUTPATIENT
Start: 2018-02-16 | End: 2018-02-18 | Stop reason: HOSPADM

## 2018-02-16 RX ADMIN — ASPIRIN 81 MG CHEWABLE TABLET 81 MG: 81 TABLET CHEWABLE at 08:08

## 2018-02-16 RX ADMIN — ATORVASTATIN CALCIUM 40 MG: 40 TABLET, FILM COATED ORAL at 19:34

## 2018-02-16 RX ADMIN — LISINOPRIL 2.5 MG: 2.5 TABLET ORAL at 18:22

## 2018-02-16 RX ADMIN — Medication 10 ML: at 08:09

## 2018-02-16 RX ADMIN — ATENOLOL 25 MG: 25 TABLET ORAL at 08:08

## 2018-02-16 RX ADMIN — IBUPROFEN 600 MG: 400 TABLET, FILM COATED ORAL at 05:48

## 2018-02-16 RX ADMIN — INFLUENZA A VIRUS A/CALIFORNIA/7/2009 X-179A (H1N1) ANTIGEN (FORMALDEHYDE INACTIVATED), INFLUENZA A VIRUS A/TEXAS/50/2012 X-223A (H3N2) ANTIGEN (FORMALDEHYDE INACTIVATED), INFLUENZA B VIRUS B/MASSACHUSETTS/2/2012 BX-51B ANTIGEN (FORMALDEHYDE INACTIVATED), AND INFLUENZA B VIRUS B/BRISBANE/60/2008 ANTIGEN (FORMALDEHYDE INACTIVATED) 0.5 ML: 15; 15; 15; 15 INJECTION, SUSPENSION INTRAMUSCULAR at 15:50

## 2018-02-16 RX ADMIN — Medication 10 ML: at 19:34

## 2018-02-16 RX ADMIN — CLOPIDOGREL BISULFATE 75 MG: 75 TABLET ORAL at 18:22

## 2018-02-16 RX ADMIN — PRASUGREL 10 MG: 10 TABLET, FILM COATED ORAL at 08:08

## 2018-02-16 RX ADMIN — IBUPROFEN 600 MG: 400 TABLET, FILM COATED ORAL at 19:35

## 2018-02-16 ASSESSMENT — PAIN SCALES - GENERAL
PAINLEVEL_OUTOF10: 0
PAINLEVEL_OUTOF10: 3
PAINLEVEL_OUTOF10: 0

## 2018-02-16 ASSESSMENT — PAIN DESCRIPTION - LOCATION: LOCATION: HAND

## 2018-02-16 ASSESSMENT — PAIN DESCRIPTION - PAIN TYPE: TYPE: CHRONIC PAIN

## 2018-02-16 NOTE — CONSULTS
Client alert and oriented to all spheres with multiple family present. Post MI - Cardiac intervention home care instructions including heart cath access site care, limitations, S & S to monitor, walking instructions and diet were given and reviewed. Cardiac Rehab was reviewed and encouraged. The client and his family were encouraged to contact his cardiologist's office with any questions or concerns following discharge. Mr. Eveline Iqbal and his family were attentive, asked appropriate questions and verbalized their understanding.

## 2018-02-17 LAB
ANION GAP SERPL CALCULATED.3IONS-SCNC: 13 MMOL/L (ref 7–19)
BUN BLDV-MCNC: 17 MG/DL (ref 8–23)
CALCIUM SERPL-MCNC: 8.4 MG/DL (ref 8.8–10.2)
CHLORIDE BLD-SCNC: 98 MMOL/L (ref 98–111)
CO2: 22 MMOL/L (ref 22–29)
CREAT SERPL-MCNC: 0.6 MG/DL (ref 0.5–1.2)
EKG P AXIS: NORMAL DEGREES
EKG P-R INTERVAL: NORMAL MS
EKG Q-T INTERVAL: 426 MS
EKG QRS DURATION: 102 MS
EKG QTC CALCULATION (BAZETT): 431 MS
EKG T AXIS: 106 DEGREES
GFR NON-AFRICAN AMERICAN: >60
GLUCOSE BLD-MCNC: 96 MG/DL (ref 74–109)
HCT VFR BLD CALC: 34 % (ref 42–52)
HEMOGLOBIN: 11.4 G/DL (ref 14–18)
MCH RBC QN AUTO: 31.5 PG (ref 27–31)
MCHC RBC AUTO-ENTMCNC: 33.5 G/DL (ref 33–37)
MCV RBC AUTO: 93.9 FL (ref 80–94)
PDW BLD-RTO: 12.1 % (ref 11.5–14.5)
PLATELET # BLD: 102 K/UL (ref 130–400)
PMV BLD AUTO: 10.8 FL (ref 9.4–12.4)
POTASSIUM SERPL-SCNC: 4 MMOL/L (ref 3.5–5)
RBC # BLD: 3.62 M/UL (ref 4.7–6.1)
SODIUM BLD-SCNC: 133 MMOL/L (ref 136–145)
WBC # BLD: 4.5 K/UL (ref 4.8–10.8)

## 2018-02-17 PROCEDURE — 36415 COLL VENOUS BLD VENIPUNCTURE: CPT

## 2018-02-17 PROCEDURE — 2580000003 HC RX 258: Performed by: INTERNAL MEDICINE

## 2018-02-17 PROCEDURE — 80048 BASIC METABOLIC PNL TOTAL CA: CPT

## 2018-02-17 PROCEDURE — 85027 COMPLETE CBC AUTOMATED: CPT

## 2018-02-17 PROCEDURE — 6370000000 HC RX 637 (ALT 250 FOR IP): Performed by: INTERNAL MEDICINE

## 2018-02-17 PROCEDURE — 99231 SBSQ HOSP IP/OBS SF/LOW 25: CPT | Performed by: INTERNAL MEDICINE

## 2018-02-17 PROCEDURE — 2100000000 HC CCU R&B

## 2018-02-17 RX ADMIN — ATORVASTATIN CALCIUM 40 MG: 40 TABLET, FILM COATED ORAL at 21:28

## 2018-02-17 RX ADMIN — CLOPIDOGREL BISULFATE 75 MG: 75 TABLET ORAL at 09:00

## 2018-02-17 RX ADMIN — ASPIRIN 81 MG CHEWABLE TABLET 81 MG: 81 TABLET CHEWABLE at 09:00

## 2018-02-17 RX ADMIN — ATENOLOL 25 MG: 25 TABLET ORAL at 09:00

## 2018-02-17 RX ADMIN — LISINOPRIL 2.5 MG: 2.5 TABLET ORAL at 09:00

## 2018-02-17 RX ADMIN — IBUPROFEN 600 MG: 400 TABLET, FILM COATED ORAL at 05:16

## 2018-02-17 RX ADMIN — IBUPROFEN 600 MG: 400 TABLET, FILM COATED ORAL at 21:28

## 2018-02-17 RX ADMIN — Medication 10 ML: at 09:00

## 2018-02-17 RX ADMIN — IBUPROFEN 600 MG: 400 TABLET, FILM COATED ORAL at 15:06

## 2018-02-17 ASSESSMENT — PAIN SCALES - GENERAL
PAINLEVEL_OUTOF10: 0

## 2018-02-18 VITALS
WEIGHT: 198 LBS | BODY MASS INDEX: 31.82 KG/M2 | OXYGEN SATURATION: 95 % | RESPIRATION RATE: 21 BRPM | DIASTOLIC BLOOD PRESSURE: 80 MMHG | TEMPERATURE: 98.6 F | SYSTOLIC BLOOD PRESSURE: 147 MMHG | HEART RATE: 60 BPM | HEIGHT: 66 IN

## 2018-02-18 LAB
LV EF: 60 %
LVEF MODALITY: NORMAL

## 2018-02-18 PROCEDURE — 93306 TTE W/DOPPLER COMPLETE: CPT

## 2018-02-18 PROCEDURE — 2580000003 HC RX 258: Performed by: INTERNAL MEDICINE

## 2018-02-18 PROCEDURE — 99238 HOSP IP/OBS DSCHRG MGMT 30/<: CPT | Performed by: INTERNAL MEDICINE

## 2018-02-18 PROCEDURE — 6370000000 HC RX 637 (ALT 250 FOR IP): Performed by: INTERNAL MEDICINE

## 2018-02-18 RX ORDER — ASPIRIN 81 MG/1
81 TABLET, CHEWABLE ORAL DAILY
Qty: 30 TABLET | Refills: 3 | Status: SHIPPED | OUTPATIENT
Start: 2018-02-19

## 2018-02-18 RX ORDER — ATORVASTATIN CALCIUM 40 MG/1
40 TABLET, FILM COATED ORAL NIGHTLY
Qty: 30 TABLET | Refills: 3 | Status: SHIPPED | OUTPATIENT
Start: 2018-02-18 | End: 2018-06-19 | Stop reason: SDUPTHER

## 2018-02-18 RX ORDER — LISINOPRIL 2.5 MG/1
2.5 TABLET ORAL DAILY
Qty: 30 TABLET | Refills: 3 | Status: ON HOLD | OUTPATIENT
Start: 2018-02-18 | End: 2018-02-28

## 2018-02-18 RX ORDER — CLOPIDOGREL BISULFATE 75 MG/1
75 TABLET ORAL DAILY
Qty: 30 TABLET | Refills: 11 | Status: ON HOLD | OUTPATIENT
Start: 2018-02-18 | End: 2018-02-28 | Stop reason: HOSPADM

## 2018-02-18 RX ADMIN — IBUPROFEN 600 MG: 400 TABLET, FILM COATED ORAL at 05:52

## 2018-02-18 RX ADMIN — ATENOLOL 25 MG: 25 TABLET ORAL at 09:09

## 2018-02-18 RX ADMIN — Medication 10 ML: at 09:10

## 2018-02-18 RX ADMIN — ASPIRIN 81 MG CHEWABLE TABLET 81 MG: 81 TABLET CHEWABLE at 09:09

## 2018-02-18 RX ADMIN — CLOPIDOGREL BISULFATE 75 MG: 75 TABLET ORAL at 09:09

## 2018-02-18 RX ADMIN — LISINOPRIL 2.5 MG: 2.5 TABLET ORAL at 09:09

## 2018-02-18 ASSESSMENT — PAIN SCALES - GENERAL
PAINLEVEL_OUTOF10: 0
PAINLEVEL_OUTOF10: 0

## 2018-02-18 NOTE — DISCHARGE SUMMARY
[facility]  Discharge Summary      Date of Admission: 2/14/2018    Date of D/C: 2/18/2018    Admitting Diagnosis: ACS    D/C Diagnosis:     Primary - NSTEMI     Secondary - CAD     Attending Physician:  Dr. Vanita Walker    Discharging Physician:  Helio Sanderson MD    Procedures: SCA, PCI to LAD    Brief Presenting History:  Nori Brown is a 58 y.o. male who presents to [facility] ED / CCU with symptoms / signs / problem or diagnosis of chest discomfort. The symptoms began last night. The chest discomfort began at rest and lasted several hours. It was worse with activity. There was partial relief with rest.  The chest discomfort was described as pressure, fullness and tightness. He reports it as indigestion. It was not crushing. It was it located in the central chest with radiation to the back, throat and left shoulder. It was described as mild. There were no associated manifestations such as nausea, vomiting, diaphoresis, presyncope, syncope, dizzyness, weakness, cold sweats, or shortness of air. When being examined this morning, he has had no symptoms of exertional chest discomfort, unusual or change in shortness of air, presyncope or syncope. \"    Presenting Physical Exam:  \"GENERAL - well developed and well nourished, in mild amount of generalized distress  HEENT -  PERRLA, Hearing appears normal, conjunctiva and lids are normal, ears and nose appear normal  NECK - no thyromegaly, no JVD, trachea is in the midline  CARDIOVASCULAR - PMI is in the left mid line clavicular position, Normal S1 and S2 with a grade 1/6 systolic murmur. No S3 or S4    PULMONARY - No respiratory distress. scattered wheezes and rales.   Breath sounds in both  lung fields are Normal  ABDOMEN  - soft, non tender, no rebound, no hepatomegaly or splenomegaly  MUSCULOSKELETAL  - Prone/Supine, digitals and nails are without clubbing or cyanosis  EXTREMITIES - trace edema  NEUROLOGIC - cranial nerves, II-XII, are normal  SKIN - turgor is normal, no rash  PSYCHIATRIC - normal mood and affect, alert and orientated x 3, judgement and insight appear appropriate\"    Pertinent Presenting Lab: Trop 0.41    Hospital Course:  Patient was admitted to the hospital and taken to the cath lab where he received PCI to the prox/mid LAD with a 2.25 x 38mm SP and 2.5 x15 SP to D1 in a [place] like technique. He also had a hematoma at his access site, but did not require intervention. He was monitored in the CCU for several days, mainly due to PCU beds. He had a TTE as well. He was discharged on DAPT, beta-blocker, and stain F/u with Dr. Allen Knowles in 3-4 weeks. Condition on D/C:  Good    Disposition:  Home    D/C meds:     Medication List      START taking these medications    aspirin 81 MG chewable tablet  Take 1 tablet by mouth daily  Start taking on:  2/19/2018     atorvastatin 40 MG tablet  Commonly known as:  LIPITOR  Take 1 tablet by mouth nightly     clopidogrel 75 MG tablet  Commonly known as:  PLAVIX  Take 1 tablet by mouth daily     lisinopril 2.5 MG tablet  Commonly known as:  PRINIVIL;ZESTRIL  Take 1 tablet by mouth daily        CONTINUE taking these medications    atenolol 25 MG tablet  Commonly known as:  TENORMIN  TAKE 1 TABLET BY MOUTH DAILY     diclofenac 75 MG EC tablet  Commonly known as:  VOLTAREN           Where to Get Your Medications      These medications were sent to [address] [place] [address], [address] [place] [address]    Phone:  [phone]   · aspirin 81 MG chewable tablet  · atorvastatin 40 MG tablet  · clopidogrel 75 MG tablet  · lisinopril 2.5 MG tablet         D/C Instructions:     Diet: Cardiac     Activity: Ad alice, no restrictions     Follow-Up:  F/u with Feroz in 4 rahelks    Jaime Cameron MD, MSc  Director of the [facility]

## 2018-02-18 NOTE — PROGRESS NOTES
[facility] : 1955, Male, 58 y.o. History: This is a 71-year-old white male emitted with acute myocardial infarction    [Allergies/Contraindications:  Codeine]     Todays status: The patient grabs no chest pain shortness of breath or arrhythmia    Physical Examination  /68   Pulse 68   Temp 98.1 °F (36.7 °C) (Temporal)   Resp 23   Ht 5' 6\" (1.676 m)   Wt 197 lb (89.4 kg)   SpO2 95%   BMI 31.80 kg/m²     Respiratory -  the lungs are clear . Cardiovascular -  rhythm is regular  Abdominal -  Soft. Bowel sounds present. Nontender. Extremities -  no lower extremity edema is appreciated. Data  Recent Labs      18   2350   NA  137   K  4.7   CL  100   CO2  24   BUN  21   CREATININE  0.8   GLUCOSE  110*   CALCIUM  9.1   PROT  7.7   LABALBU  3.9   BILITOT  0.5   ALKPHOS  50   AST  180*   ALT  125*       Lab Results   Component Value Date    TROPONINI 0.52 ([facility]) 2018       Telemetry--sinus    Impression/Plan          Moved to progressive care this afternoon. We will recheck lab work.  We plan discharge on  if all is well      Reji Hull MD    [facility] [place]
Echo completed
No change in assessment. Pt remains sitting up in chair. Lunch tray given. No other needs.
No changes.
No changes. Pt has been up walking around room. Took a walk through the unit. Tolerated activity well. Sitting up in chair now.
No complaints of pain throughout night.
Received patient post cardiac cath with stents.  Right groin with pressure dressing
Received pt post cardiac cath with stents. Settled back in room and instructed to  Keep head on pillow and right leg straight.
Labs:   CBC:   Recent Labs      02/14/18   2350  02/16/18   0255   WBC  4.0*  4.5*   HGB  14.7  11.8*   HCT  44.0  35.1*   PLT  120*  105*     BMP: Recent Labs      02/14/18   2350   NA  137   K  4.7   CO2  24   BUN  21   CREATININE  0.8   LABGLOM  >60   GLUCOSE  110*     BNP: No results for input(s): BNP in the last 72 hours.   PT/INR:   Recent Labs      02/14/18   2350   PROTIME  15.7*   INR  1.26*     APTT:  Recent Labs      02/15/18   0645  02/15/18   1230   APTT  71.1*  102.0*     CARDIAC ENZYMES:  Recent Labs      02/15/18   1444  02/15/18   2032  02/16/18   0255   TROPONINI  0.48*  0.45*  0.52*     FASTING LIPID PANEL:  Lab Results   Component Value Date    HDL 23 02/16/2018    LDLDIRECT 105 02/23/2013    LDLCALC 68 02/16/2018    TRIG 201 02/16/2018     LIVER PROFILE:  Recent Labs      02/14/18   2350   AST  180*   ALT  125*   LABALBU  3.9       NURSE:  Nicole Muniz RN    See attached note

## 2018-02-19 LAB
LV EF: 45 %
LVEF MODALITY: NORMAL

## 2018-02-20 ENCOUNTER — TELEPHONE (OUTPATIENT)
Dept: CARDIOLOGY | Age: 63
End: 2018-02-20

## 2018-02-20 DIAGNOSIS — E87.8 ELECTROLYTE ABNORMALITY: Primary | ICD-10-CM

## 2018-02-20 NOTE — TELEPHONE ENCOUNTER
Called patient advised to come into office tomorrow at 8:30 and to have labs prior. Understanding voiced.

## 2018-02-21 ENCOUNTER — OFFICE VISIT (OUTPATIENT)
Dept: CARDIOLOGY | Age: 63
End: 2018-02-21
Payer: MEDICARE

## 2018-02-21 VITALS
BODY MASS INDEX: 25.39 KG/M2 | HEIGHT: 66 IN | DIASTOLIC BLOOD PRESSURE: 80 MMHG | WEIGHT: 158 LBS | HEART RATE: 68 BPM | SYSTOLIC BLOOD PRESSURE: 128 MMHG | TEMPERATURE: 96.3 F

## 2018-02-21 DIAGNOSIS — E78.2 MIXED HYPERLIPIDEMIA: ICD-10-CM

## 2018-02-21 DIAGNOSIS — I25.10 CORONARY ARTERY DISEASE INVOLVING NATIVE CORONARY ARTERY OF NATIVE HEART WITHOUT ANGINA PECTORIS: Primary | ICD-10-CM

## 2018-02-21 DIAGNOSIS — R25.2 LEG CRAMPS: ICD-10-CM

## 2018-02-21 DIAGNOSIS — F17.211 CIGARETTE NICOTINE DEPENDENCE IN REMISSION: ICD-10-CM

## 2018-02-21 DIAGNOSIS — S30.1XXD HEMATOMA OF GROIN, SUBSEQUENT ENCOUNTER: ICD-10-CM

## 2018-02-21 DIAGNOSIS — E87.8 ELECTROLYTE ABNORMALITY: ICD-10-CM

## 2018-02-21 LAB
ANION GAP SERPL CALCULATED.3IONS-SCNC: 12 MMOL/L (ref 7–19)
BUN BLDV-MCNC: 23 MG/DL (ref 8–23)
CALCIUM SERPL-MCNC: 9 MG/DL (ref 8.8–10.2)
CHLORIDE BLD-SCNC: 96 MMOL/L (ref 98–111)
CO2: 24 MMOL/L (ref 22–29)
CREAT SERPL-MCNC: 0.9 MG/DL (ref 0.5–1.2)
GFR NON-AFRICAN AMERICAN: >60
GLUCOSE BLD-MCNC: 109 MG/DL (ref 74–109)
POTASSIUM SERPL-SCNC: 4.4 MMOL/L (ref 3.5–5)
SODIUM BLD-SCNC: 132 MMOL/L (ref 136–145)

## 2018-02-21 PROCEDURE — 1111F DSCHRG MED/CURRENT MED MERGE: CPT | Performed by: CLINICAL NURSE SPECIALIST

## 2018-02-21 PROCEDURE — G8427 DOCREV CUR MEDS BY ELIG CLIN: HCPCS | Performed by: CLINICAL NURSE SPECIALIST

## 2018-02-21 PROCEDURE — 3017F COLORECTAL CA SCREEN DOC REV: CPT | Performed by: CLINICAL NURSE SPECIALIST

## 2018-02-21 PROCEDURE — 99213 OFFICE O/P EST LOW 20 MIN: CPT | Performed by: CLINICAL NURSE SPECIALIST

## 2018-02-21 PROCEDURE — G8484 FLU IMMUNIZE NO ADMIN: HCPCS | Performed by: CLINICAL NURSE SPECIALIST

## 2018-02-21 PROCEDURE — 4004F PT TOBACCO SCREEN RCVD TLK: CPT | Performed by: CLINICAL NURSE SPECIALIST

## 2018-02-21 PROCEDURE — G8598 ASA/ANTIPLAT THER USED: HCPCS | Performed by: CLINICAL NURSE SPECIALIST

## 2018-02-21 PROCEDURE — G8419 CALC BMI OUT NRM PARAM NOF/U: HCPCS | Performed by: CLINICAL NURSE SPECIALIST

## 2018-02-21 ASSESSMENT — ENCOUNTER SYMPTOMS
COUGH: 0
BLURRED VISION: 0
HEARTBURN: 0
VOMITING: 0
ORTHOPNEA: 0
NAUSEA: 0
SHORTNESS OF BREATH: 0

## 2018-02-21 NOTE — PATIENT INSTRUCTIONS
and medicines. These can increase your chances of quitting for good. · Be active. Get at least 30 minutes of exercise on most days of the week. Walking is a good choice. You also may want to do other activities, such as running, swimming, cycling, or playing tennis or team sports. · Eat heart-healthy foods. Eat more fruits and vegetables and less foods that contain saturated and trans fats. Limit alcohol, sodium, and sweets. · Stay at a healthy weight. Lose weight if you need to. · Manage other health problems such as diabetes, high blood pressure, and high cholesterol. · Manage stress. Stress can hurt your heart. To keep stress low, talk about your problems and feelings. Don't keep your feelings hidden. · If you have talked about it with your doctor, take a low-dose aspirin every day. Aspirin can help certain people lower their risk of a heart attack or stroke. But taking aspirin isn't right for everyone, because it can cause serious bleeding. Do not start taking daily aspirin unless your doctor knows about it. How is coronary artery disease treated? · Your doctor will suggest that you make lifestyle changes. For example, your doctor may ask you to eat healthy foods, quit smoking, lose extra weight, and be more active. · You will have to take medicines. · Your doctor may suggest a procedure to open narrowed or blocked arteries. This is called angioplasty. Or your doctor may suggest using healthy blood vessels to create detours around narrowed or blocked arteries. This is called bypass surgery. Follow-up care is a key part of your treatment and safety. Be sure to make and go to all appointments, and call your doctor if you are having problems. It's also a good idea to know your test results and keep a list of the medicines you take. Where can you learn more? Go to https://paz.[employer]. org and sign in to your [employer] account.  Enter [phone] in the My [employer] Database box to learn more about \"Learning About Coronary Artery Disease (CAD). \"     If you do not have an account, please click on the \"Sign Up Now\" link. Current as of: September 21, 2016  Content Version: 11.5  © 0108-2099 Healthwise, Incorporated. Care instructions adapted under license by [facility] ([facility]). If you have questions about a medical condition or this instruction, always ask your healthcare professional. [address] any warranty or liability for your use of this information.

## 2018-02-21 NOTE — PROGRESS NOTES
[facility], [address] [facility] [address] [facility] [place] [address], [address] [address]  Phone: [phone]  Fax: [phone]    OFFICE VISIT:  2018    Nick Zarate - : 1955    Reason For Visit:  Gelacio Chavarria is a 58 y.o. male who is here for hospital follow-up status post heart cath with complaints of cramping and right leg and hardening at right groin cath site    HPI  Patient called the office yesterday with complaints of hardness at cath site in his main complaint was a severe cramping episode in his right leg and calf area while walking which has now resolved. Patient did a BMP on the way up to the office today which is currently pending. Patient states he has had cramping in the legs in the past. He states he is probably somewhat dehydrated and has not been drinking enough fluids. His groin site has a hematoma. He was concerned about some hardness at the site. He denies any further chest pain, unusual dyspnea, orthopnea, PND, edema, or palpitations. He states he has not smoked since his hospital stay and plans to remain smoke-free. Patient had a non-STEMI and 2 drug-eluting stents placed     JEFF Silva is PCP.   Nick Zarate has the following history as recorded in [facility]:    Patient Active Problem List    Diagnosis Date Noted    Mixed hyperlipidemia 2018    Cigarette nicotine dependence in remission 2018    CAD (coronary artery disease) 02/15/2018    Sinoatrial node dysfunction ([address] 02/15/2018    Elevated troponin     Tobacco abuse     Osteoarthritis     Carotid artery stenosis      Past Medical History:   Diagnosis Date    Bradycardia     3/5/13  loop recorder    Carotid artery stenosis     Cigarette nicotine dependence in remission 2018    Hypoglycemia     Mixed hyperlipidemia 2018    Near syncope     Osteoarthritis     Pacemaker 2013    loop recorder removed    S/P carotid endarterectomy     right internal carotid    Sinoatrial node for falls and myalgias. C/o leg cramps   Skin: Negative for rash. Neurological: Negative for dizziness, sensory change, speech change and focal weakness. Endo/Heme/Allergies: Does not bruise/bleed easily. Psychiatric/Behavioral: Negative for depression. The patient is not nervous/anxious. Objective  Vital Signs - /80   Pulse 68   Temp 96.3 °F (35.7 °C) (Tympanic)   Ht 5' 6\" (1.676 m)   Wt 158 lb (71.7 kg)   BMI 25.50 kg/m²   Physical Exam   Constitutional: He is oriented to person, place, and time. He appears well-developed and well-nourished. HENT:   Head: Normocephalic and atraumatic. Eyes: Pupils are equal, round, and reactive to light. Right eye exhibits no discharge. Left eye exhibits no discharge. Neck: No JVD present. No tracheal deviation present. Cardiovascular: Normal rate, regular rhythm, normal heart sounds and intact distal pulses. Exam reveals no gallop and no friction rub. No murmur heard. No carotid bruit  Small knot, right groin  Right foot pedal pulses are palpable 2+, foot warm, capillary refill 3 seconds   Pulmonary/Chest: Effort normal and breath sounds normal. No respiratory distress. He has no wheezes. He has no rales. Abdominal: Soft. There is no tenderness. Musculoskeletal: He exhibits no edema. Normal gait and station   Neurological: He is alert and oriented to person, place, and time. No cranial nerve deficit. Skin: Skin is warm and dry. No rash noted. Hematoma right groin and right inner thigh. Area is soft   Psychiatric: He has a normal mood and affect. His behavior is normal. Judgment normal.   Nursing note and vitals reviewed. Assessment:    1. Coronary artery disease involving native coronary artery of native heart without angina pectoris     2. Hematoma of groin, subsequent encounter     3. Mixed hyperlipidemia     4. Leg cramps     5.  Cigarette nicotine dependence in remission       Patient is taking medications as

## 2018-02-26 ENCOUNTER — TELEPHONE (OUTPATIENT)
Dept: CARDIOLOGY | Age: 63
End: 2018-02-26

## 2018-02-26 ENCOUNTER — APPOINTMENT (OUTPATIENT)
Dept: GENERAL RADIOLOGY | Age: 63
End: 2018-02-26
Payer: MEDICARE

## 2018-02-26 ENCOUNTER — HOSPITAL ENCOUNTER (OUTPATIENT)
Age: 63
Setting detail: OBSERVATION
Discharge: HOME | End: 2018-02-28
Attending: EMERGENCY MEDICINE | Admitting: INTERNAL MEDICINE
Payer: MEDICARE

## 2018-02-26 DIAGNOSIS — I21.4 NSTEMI (NON-ST ELEVATED MYOCARDIAL INFARCTION) (HCC): Primary | ICD-10-CM

## 2018-02-26 PROBLEM — R07.9 CHEST PAIN: Status: ACTIVE | Noted: 2018-02-26

## 2018-02-26 LAB
ALBUMIN SERPL-MCNC: 3.5 G/DL (ref 3.5–5.2)
ALP BLD-CCNC: 42 U/L (ref 40–130)
ALT SERPL-CCNC: 65 U/L (ref 5–41)
ANION GAP SERPL CALCULATED.3IONS-SCNC: 13 MMOL/L (ref 7–19)
AST SERPL-CCNC: 94 U/L (ref 5–40)
BASOPHILS ABSOLUTE: 0 K/UL (ref 0–0.2)
BASOPHILS RELATIVE PERCENT: 0.7 % (ref 0–1)
BILIRUB SERPL-MCNC: 0.8 MG/DL (ref 0.2–1.2)
BUN BLDV-MCNC: 20 MG/DL (ref 8–23)
CALCIUM SERPL-MCNC: 8.9 MG/DL (ref 8.8–10.2)
CHLORIDE BLD-SCNC: 100 MMOL/L (ref 98–111)
CO2: 23 MMOL/L (ref 22–29)
CREAT SERPL-MCNC: 1.1 MG/DL (ref 0.5–1.2)
EOSINOPHILS ABSOLUTE: 0.3 K/UL (ref 0–0.6)
EOSINOPHILS RELATIVE PERCENT: 6.1 % (ref 0–5)
GFR NON-AFRICAN AMERICAN: >60
GLUCOSE BLD-MCNC: 117 MG/DL (ref 74–109)
HCT VFR BLD CALC: 34.2 % (ref 42–52)
HCT VFR BLD CALC: 37 % (ref 42–52)
HEMOGLOBIN: 12.3 G/DL (ref 14–18)
LYMPHOCYTES ABSOLUTE: 0.8 K/UL (ref 1.1–4.5)
LYMPHOCYTES RELATIVE PERCENT: 15 % (ref 20–40)
MCH RBC QN AUTO: 30.8 PG (ref 27–31)
MCHC RBC AUTO-ENTMCNC: 33.2 G/DL (ref 33–37)
MCV RBC AUTO: 92.7 FL (ref 80–94)
MONOCYTES ABSOLUTE: 0.7 K/UL (ref 0–0.9)
MONOCYTES RELATIVE PERCENT: 13.1 % (ref 0–10)
NEUTROPHILS ABSOLUTE: 3.5 K/UL (ref 1.5–7.5)
NEUTROPHILS RELATIVE PERCENT: 64.7 % (ref 50–65)
P2Y12 RESULT: 228 PRU (ref 194–418)
PDW BLD-RTO: 12.2 % (ref 11.5–14.5)
PERFORMED ON: ABNORMAL
PERFORMED ON: NORMAL
PLATELET # BLD: 206 K/UL (ref 130–400)
PLATELET # BLD: 211 K/UL (ref 130–400)
PMV BLD AUTO: 9.4 FL (ref 9.4–12.4)
POC TROPONIN I: 0.03 NG/ML (ref 0–0.08)
POC TROPONIN I: 0.1 NG/ML (ref 0–0.08)
POTASSIUM SERPL-SCNC: 3.8 MMOL/L (ref 3.5–5)
RBC # BLD: 3.99 M/UL (ref 4.7–6.1)
SODIUM BLD-SCNC: 136 MMOL/L (ref 136–145)
TOTAL PROTEIN: 7.8 G/DL (ref 6.6–8.7)
TROPONIN: 0.04 NG/ML (ref 0–0.03)
TROPONIN: 0.05 NG/ML (ref 0–0.03)
WBC # BLD: 5.4 K/UL (ref 4.8–10.8)

## 2018-02-26 PROCEDURE — 85014 HEMATOCRIT: CPT

## 2018-02-26 PROCEDURE — 36415 COLL VENOUS BLD VENIPUNCTURE: CPT

## 2018-02-26 PROCEDURE — G0378 HOSPITAL OBSERVATION PER HR: HCPCS

## 2018-02-26 PROCEDURE — 85576 BLOOD PLATELET AGGREGATION: CPT

## 2018-02-26 PROCEDURE — 80053 COMPREHEN METABOLIC PANEL: CPT

## 2018-02-26 PROCEDURE — 99219 PR INITIAL OBSERVATION CARE/DAY 50 MINUTES: CPT | Performed by: INTERNAL MEDICINE

## 2018-02-26 PROCEDURE — 85049 AUTOMATED PLATELET COUNT: CPT

## 2018-02-26 PROCEDURE — 6360000002 HC RX W HCPCS: Performed by: INTERNAL MEDICINE

## 2018-02-26 PROCEDURE — 84484 ASSAY OF TROPONIN QUANT: CPT

## 2018-02-26 PROCEDURE — 99285 EMERGENCY DEPT VISIT HI MDM: CPT | Performed by: EMERGENCY MEDICINE

## 2018-02-26 PROCEDURE — 2580000003 HC RX 258: Performed by: INTERNAL MEDICINE

## 2018-02-26 PROCEDURE — 6370000000 HC RX 637 (ALT 250 FOR IP): Performed by: NURSE PRACTITIONER

## 2018-02-26 PROCEDURE — 96372 THER/PROPH/DIAG INJ SC/IM: CPT

## 2018-02-26 PROCEDURE — 6370000000 HC RX 637 (ALT 250 FOR IP): Performed by: INTERNAL MEDICINE

## 2018-02-26 PROCEDURE — 99285 EMERGENCY DEPT VISIT HI MDM: CPT

## 2018-02-26 PROCEDURE — 71046 X-RAY EXAM CHEST 2 VIEWS: CPT

## 2018-02-26 PROCEDURE — 93005 ELECTROCARDIOGRAM TRACING: CPT

## 2018-02-26 PROCEDURE — 85025 COMPLETE CBC W/AUTO DIFF WBC: CPT

## 2018-02-26 RX ORDER — LISINOPRIL 2.5 MG/1
2.5 TABLET ORAL DAILY
Status: DISCONTINUED | OUTPATIENT
Start: 2018-02-27 | End: 2018-02-28

## 2018-02-26 RX ORDER — ACETAMINOPHEN 325 MG/1
650 TABLET ORAL EVERY 4 HOURS PRN
Status: DISCONTINUED | OUTPATIENT
Start: 2018-02-26 | End: 2018-02-28 | Stop reason: HOSPADM

## 2018-02-26 RX ORDER — ONDANSETRON 2 MG/ML
4 INJECTION INTRAMUSCULAR; INTRAVENOUS EVERY 6 HOURS PRN
Status: DISCONTINUED | OUTPATIENT
Start: 2018-02-26 | End: 2018-02-28 | Stop reason: HOSPADM

## 2018-02-26 RX ORDER — IBUPROFEN 600 MG/1
600 TABLET ORAL EVERY 8 HOURS
Status: DISCONTINUED | OUTPATIENT
Start: 2018-02-26 | End: 2018-02-28 | Stop reason: HOSPADM

## 2018-02-26 RX ORDER — ASPIRIN 81 MG/1
81 TABLET, CHEWABLE ORAL DAILY
Status: DISCONTINUED | OUTPATIENT
Start: 2018-02-26 | End: 2018-02-26 | Stop reason: SDUPTHER

## 2018-02-26 RX ORDER — SODIUM CHLORIDE 0.9 % (FLUSH) 0.9 %
10 SYRINGE (ML) INJECTION EVERY 12 HOURS SCHEDULED
Status: DISCONTINUED | OUTPATIENT
Start: 2018-02-26 | End: 2018-02-28 | Stop reason: HOSPADM

## 2018-02-26 RX ORDER — DICLOFENAC SODIUM 75 MG/1
75 TABLET, DELAYED RELEASE ORAL 2 TIMES DAILY
Status: DISCONTINUED | OUTPATIENT
Start: 2018-02-26 | End: 2018-02-26 | Stop reason: CLARIF

## 2018-02-26 RX ORDER — ATORVASTATIN CALCIUM 40 MG/1
40 TABLET, FILM COATED ORAL NIGHTLY
Status: DISCONTINUED | OUTPATIENT
Start: 2018-02-26 | End: 2018-02-28 | Stop reason: HOSPADM

## 2018-02-26 RX ORDER — CLOPIDOGREL BISULFATE 75 MG/1
75 TABLET ORAL DAILY
Status: DISCONTINUED | OUTPATIENT
Start: 2018-02-27 | End: 2018-02-27

## 2018-02-26 RX ORDER — ATENOLOL 25 MG/1
25 TABLET ORAL DAILY
Status: DISCONTINUED | OUTPATIENT
Start: 2018-02-27 | End: 2018-02-28 | Stop reason: HOSPADM

## 2018-02-26 RX ORDER — ASPIRIN 81 MG/1
81 TABLET, CHEWABLE ORAL DAILY
Status: DISCONTINUED | OUTPATIENT
Start: 2018-02-27 | End: 2018-02-28 | Stop reason: HOSPADM

## 2018-02-26 RX ORDER — ASPIRIN 325 MG
325 TABLET ORAL ONCE
Status: COMPLETED | OUTPATIENT
Start: 2018-02-26 | End: 2018-02-26

## 2018-02-26 RX ORDER — SODIUM CHLORIDE 0.9 % (FLUSH) 0.9 %
10 SYRINGE (ML) INJECTION PRN
Status: DISCONTINUED | OUTPATIENT
Start: 2018-02-26 | End: 2018-02-28 | Stop reason: HOSPADM

## 2018-02-26 RX ORDER — CLOPIDOGREL BISULFATE 75 MG/1
75 TABLET ORAL DAILY
Status: DISCONTINUED | OUTPATIENT
Start: 2018-02-26 | End: 2018-02-26 | Stop reason: SDUPTHER

## 2018-02-26 RX ORDER — SODIUM CHLORIDE 9 MG/ML
INJECTION, SOLUTION INTRAVENOUS CONTINUOUS
Status: DISCONTINUED | OUTPATIENT
Start: 2018-02-26 | End: 2018-02-27

## 2018-02-26 RX ADMIN — ENOXAPARIN SODIUM 40 MG: 40 INJECTION SUBCUTANEOUS at 17:47

## 2018-02-26 RX ADMIN — SODIUM CHLORIDE: 9 INJECTION, SOLUTION INTRAVENOUS at 17:17

## 2018-02-26 RX ADMIN — ATORVASTATIN CALCIUM 40 MG: 40 TABLET, FILM COATED ORAL at 19:54

## 2018-02-26 RX ADMIN — Medication 10 ML: at 19:54

## 2018-02-26 RX ADMIN — IBUPROFEN 600 MG: 600 TABLET, FILM COATED ORAL at 17:48

## 2018-02-26 RX ADMIN — ASPIRIN 325 MG ORAL TABLET 325 MG: 325 PILL ORAL at 11:59

## 2018-02-26 ASSESSMENT — PAIN SCALES - GENERAL
PAINLEVEL_OUTOF10: 0

## 2018-02-26 NOTE — ED PROVIDER NOTES
[address] [facility] [facility] EMERGENCY DEPT  eMERGENCY dEPARTMENT eNCOUnter      Pt Name: Fidencio Mcardle  MRN: [id number]  [place] 1955  Date of evaluation: 2/26/2018  Provider: Penney Gottron, APRN    CHIEF COMPLAINT       Chief Complaint   Patient presents with    Chest Pain     hx of stents per Dr Marcello Do 2 weeks ago, presents with chest pains this am         HISTORY OF PRESENT ILLNESS   (Location/Symptom, Timing/Onset, Context/Setting, Quality, Duration, Modifying Factors, Severity)  Note limiting factors. Fidencio Mcardle is a 61 y.o. male who presents to the emergency department for evaluation of chest pain. Pt tells me that he developed chest pain described as heaviness \"indigestion\" today an hour prior to arrival. He relates that pain began at rest and was associated with shortness of breath. He has history of recent PCI w/stent to LAD performed by Dr. Marcello Do 2/19/18. He tells me that chest pain was similar to previous anginal pain. He tells me that he continues with plavix as previously prescribed. HPI    Nursing Notes were reviewed. REVIEW OF SYSTEMS    (2-9 systems for level 4, 10 or more for level 5)     Review of Systems   Constitutional: Negative. Cardiovascular: Positive for chest pain. A complete review of systems was performed and is negative except as noted above in the HPI.        PAST MEDICAL HISTORY     Past Medical History:   Diagnosis Date    Bradycardia     3/5/13  loop recorder    Carotid artery stenosis     Cigarette nicotine dependence in remission 2/21/2018    Hypoglycemia     Mixed hyperlipidemia 2/21/2018    Near syncope     Osteoarthritis     Pacemaker 5/22/2013    loop recorder removed    S/P carotid endarterectomy     right internal carotid    Sinoatrial node dysfunction ([address] 2/15/2018    3/5/13  loop recorder 5/22/2013  Dual chamber PPM    Tobacco abuse          SURGICAL HISTORY       Past Surgical History:   Procedure Laterality Date    CORONARY ANGIOPLASTY WITH STENT PLACEMENT 02/15/2018    SP prox LAD and Osteal diagonal    PACEMAKER INSERTION  5/22/2013    VASCULAR SURGERY  2-27-13 [facility]    Right Carotid endarterectomy, eversion technique with completion duplex US         CURRENT MEDICATIONS       Previous Medications    ASPIRIN 81 MG CHEWABLE TABLET    Take 1 tablet by mouth daily    ATENOLOL (TENORMIN) 25 MG TABLET    TAKE 1 TABLET BY MOUTH DAILY    ATORVASTATIN (LIPITOR) 40 MG TABLET    Take 1 tablet by mouth nightly    CLOPIDOGREL (PLAVIX) 75 MG TABLET    Take 1 tablet by mouth daily    DICLOFENAC (VOLTAREN) 75 MG EC TABLET    75 mg 2 times daily    LISINOPRIL (PRINIVIL;ZESTRIL) 2.5 MG TABLET    Take 1 tablet by mouth daily       ALLERGIES     Codeine    FAMILY HISTORY       Family History   Problem Relation Age of Onset    Diabetes Mother     Heart Disease Father     High Blood Pressure Father     Diabetes Father     Stroke Father           SOCIAL HISTORY       Social History     Social History    Marital status:      Spouse name: N/A    Number of children: N/A    Years of education: N/A     Social History Main Topics    Smoking status: Current Every Day Smoker     Packs/day: 1.50     Years: 50.00     Types: Cigarettes    Smokeless tobacco: Former User     Types: Chew    Alcohol use No      Comment: 1-2 beers a daily Weekend Drinker at times   [employer] Drug use: No      Comment: Past Use    Sexual activity: No     Other Topics Concern    None     Social History Narrative    None       SCREENINGS             PHYSICAL EXAM    (up to 7 for level 4, 8 or more for level 5)     ED Triage Vitals [02/26/18 1001]   BP Temp Temp src Pulse Resp SpO2 Height Weight   120/79 -- -- 69 15 96 % 5' 6\" (1.676 m) 158 lb (71.7 kg)       Physical Exam   Constitutional: He is oriented to person, place, and time. He appears well-nourished. HENT:   Head: Normocephalic.    Right Ear: External ear normal.   Left Ear: External ear normal.   Mouth/Throat: Oropharynx is clear and moist.   Eyes: Conjunctivae and EOM are normal. Pupils are equal, round, and reactive to light. Neck: Normal range of motion. Cardiovascular: Normal rate, regular rhythm, normal heart sounds and intact distal pulses. Pulmonary/Chest: Effort normal and breath sounds normal.   Abdominal: Soft. Bowel sounds are normal.   Musculoskeletal: Normal range of motion. Right groin/thigh with large area of old appearing bruise. CMS intact right lower extremity. Neurological: He is alert and oriented to person, place, and time. Skin: Skin is warm and dry. Vitals reviewed. DIAGNOSTIC RESULTS     EKG: All EKG's are interpreted by the Emergency Department Physician who either signs or Co-signs this chart in the absence of a cardiologist.  Atrial paced with PVC. No obvious ST elevation or ST depression. RADIOLOGY:   Non-plain film images such as CT, Ultrasound and MRI are read by the radiologist. Plain radiographic images are visualized and preliminarily interpreted by the emergency physician with the below findings:        Interpretation per the Radiologist below, if available at the time of this note:    XR CHEST STANDARD (2 VW)   Final Result   1. Mild hyperinflation lungs with minimal basilar atelectasis. Otherwise no acute process. 2. Left subclavian cardiac pacer device.    Signed by Dr Catrachita Ibrahim on 2/26/2018 10:34 AM            ED BEDSIDE ULTRASOUND:   Performed by ED Physician - none    LABS:  Labs Reviewed   CBC WITH AUTO DIFFERENTIAL - Abnormal; Notable for the following:        Result Value    RBC 3.99 (*)     Hemoglobin 12.3 (*)     Hematocrit 37.0 (*)     Lymphocytes % 15.0 (*)     Monocytes % 13.1 (*)     Eosinophils % 6.1 (*)     Lymphocytes # 0.8 (*)     All other components within normal limits   COMPREHENSIVE METABOLIC PANEL - Abnormal; Notable for the following:     Glucose 117 (*)     ALT 65 (*)     AST 94 (*)     All other components within normal limits   POCT VENOUS - Abnormal; Notable for

## 2018-02-26 NOTE — H&P
Cardiology History and Physical Note        ADMISSION DAY:  2/26/2018 10:02 AM         History of Present Illness: The patient is a 63-year-old white male admitted with chest pain. The patient is well known to me from my office practice and previous hospitalizations. She was discharged from the hospital a little more than a week ago where he presented with chest pain. The patient underwent extensive stenting to the left anterior descending and left anterior descending diagonal. Patient was able to be discharged without problem. The patient hasn't had no exertional discomfort typical of angina. This morning the patient was prepared for breakfast when he developed discomfort in his anterior chest. The patient has had no exertional problems. She did note mild nausea without shortness . The patient is noted no evidence of GI bleeding. EKG on presentation fails to reveal acute ST-T wave changes the patient describes no orthopnea paroxysmal nocturnal dyspnea or pedal edema he's had no tachycardia or bradycardia arrhythmias    Allergies   Allergen Reactions    Codeine Anaphylaxis       Current Medications  No current facility-administered medications for this encounter. Past Medical History   has a past medical history of Bradycardia; Carotid artery stenosis; Cigarette nicotine dependence in remission; Hypoglycemia; Mixed hyperlipidemia; Near syncope; Osteoarthritis; Pacemaker; S/P carotid endarterectomy; Sinoatrial node dysfunction (HCC); and Tobacco abuse. Past Surgical History   has a past surgical history that includes vascular surgery (2-27-13 [facility]); Pacemaker insertion (5/22/2013); and Coronary angioplasty with stent (02/15/2018). Social History   reports that he has been smoking Cigarettes. He has a 75.00 pack-year smoking history. He has quit using smokeless tobacco. His smokeless tobacco use included Chew. He reports that he does not drink alcohol or use drugs.     Family History  family history Other Electrolytes:  FLP:    Lab Results   Component Value Date    TRIG 201 02/16/2018    HDL 23 02/16/2018    LDLCALC 68 02/16/2018    LDLDIRECT 105 02/23/2013     TSH:    Lab Results   Component Value Date    TSH 0.935 02/23/2013     Troponin T:   Recent Labs      02/26/18   1010  02/26/18   1206   TROPONINI  0.03  0.10*     INR: No results for input(s): INR in the last 72 hours.     Telemetry:  (I reviewed) Sinus  EKG: (I reviewed) sinus without evidence of acute infarct    Assessment/Plan:      Rule out myocardial necrosis -check Plavix function- check echo        Erasmo Lester MD

## 2018-02-26 NOTE — PROGRESS NOTES
Pt transferred to [address] [place] [address] via stretcher from ER. Pt alert and oriented.   No c/o at this time

## 2018-02-26 NOTE — ED NOTES
Assessment:  Airway intact. Pt respirations even and unlabored, skin color within normal limits. Skin is warm and dry. Capillary refill less than 2 seconds. Bowel sounds within normal limits. Abdomen soft and nontender. Alert and oriented x 4. Pt is in no acute distress. No edema noted to extremities. Call light within reach, pt gowned. Pt on monitor and alarms on.      Armando Multani RN  02/26/18 [id number]

## 2018-02-27 LAB
BASOPHILS ABSOLUTE: 0 K/UL (ref 0–0.2)
BASOPHILS RELATIVE PERCENT: 0.8 % (ref 0–1)
CHOLESTEROL, TOTAL: 86 MG/DL (ref 160–199)
EKG P AXIS: 63 DEGREES
EKG P AXIS: NORMAL DEGREES
EKG P-R INTERVAL: 232 MS
EKG P-R INTERVAL: NORMAL MS
EKG Q-T INTERVAL: 404 MS
EKG Q-T INTERVAL: 406 MS
EKG QRS DURATION: 96 MS
EKG QRS DURATION: 96 MS
EKG QTC CALCULATION (BAZETT): 401 MS
EKG QTC CALCULATION (BAZETT): 414 MS
EKG T AXIS: 84 DEGREES
EKG T AXIS: 85 DEGREES
EOSINOPHILS ABSOLUTE: 0.4 K/UL (ref 0–0.6)
EOSINOPHILS RELATIVE PERCENT: 7.1 % (ref 0–5)
HCT VFR BLD CALC: 32 % (ref 42–52)
HCT VFR BLD CALC: 34 % (ref 42–52)
HDLC SERPL-MCNC: 22 MG/DL (ref 55–121)
HEMOGLOBIN: 10.8 G/DL (ref 14–18)
HEMOGLOBIN: 11.3 G/DL (ref 14–18)
LDL CHOLESTEROL CALCULATED: 40 MG/DL
LYMPHOCYTES ABSOLUTE: 1.2 K/UL (ref 1.1–4.5)
LYMPHOCYTES RELATIVE PERCENT: 22.2 % (ref 20–40)
MCH RBC QN AUTO: 30.6 PG (ref 27–31)
MCH RBC QN AUTO: 31.2 PG (ref 27–31)
MCHC RBC AUTO-ENTMCNC: 33.2 G/DL (ref 33–37)
MCHC RBC AUTO-ENTMCNC: 33.8 G/DL (ref 33–37)
MCV RBC AUTO: 92.1 FL (ref 80–94)
MCV RBC AUTO: 92.5 FL (ref 80–94)
MONOCYTES ABSOLUTE: 0.7 K/UL (ref 0–0.9)
MONOCYTES RELATIVE PERCENT: 13.2 % (ref 0–10)
NEUTROPHILS ABSOLUTE: 3 K/UL (ref 1.5–7.5)
NEUTROPHILS RELATIVE PERCENT: 56.1 % (ref 50–65)
PDW BLD-RTO: 12 % (ref 11.5–14.5)
PDW BLD-RTO: 12.1 % (ref 11.5–14.5)
PLATELET # BLD: 187 K/UL (ref 130–400)
PLATELET # BLD: 193 K/UL (ref 130–400)
PMV BLD AUTO: 9.3 FL (ref 9.4–12.4)
PMV BLD AUTO: 9.6 FL (ref 9.4–12.4)
RAPID INFLUENZA  B AGN: NEGATIVE
RAPID INFLUENZA A AGN: NEGATIVE
RBC # BLD: 3.46 M/UL (ref 4.7–6.1)
RBC # BLD: 3.69 M/UL (ref 4.7–6.1)
TRIGL SERPL-MCNC: 119 MG/DL (ref 0–149)
TROPONIN: 0.04 NG/ML (ref 0–0.03)
WBC # BLD: 5.3 K/UL (ref 4.8–10.8)
WBC # BLD: 5.4 K/UL (ref 4.8–10.8)

## 2018-02-27 PROCEDURE — 85025 COMPLETE CBC W/AUTO DIFF WBC: CPT

## 2018-02-27 PROCEDURE — G0378 HOSPITAL OBSERVATION PER HR: HCPCS

## 2018-02-27 PROCEDURE — 87040 BLOOD CULTURE FOR BACTERIA: CPT

## 2018-02-27 PROCEDURE — 6370000000 HC RX 637 (ALT 250 FOR IP): Performed by: INTERNAL MEDICINE

## 2018-02-27 PROCEDURE — 2580000003 HC RX 258: Performed by: INTERNAL MEDICINE

## 2018-02-27 PROCEDURE — 80061 LIPID PANEL: CPT

## 2018-02-27 PROCEDURE — 6360000002 HC RX W HCPCS: Performed by: INTERNAL MEDICINE

## 2018-02-27 PROCEDURE — 85027 COMPLETE CBC AUTOMATED: CPT

## 2018-02-27 PROCEDURE — 84484 ASSAY OF TROPONIN QUANT: CPT

## 2018-02-27 PROCEDURE — 99225 PR SBSQ OBSERVATION CARE/DAY 25 MINUTES: CPT | Performed by: INTERNAL MEDICINE

## 2018-02-27 PROCEDURE — 96372 THER/PROPH/DIAG INJ SC/IM: CPT

## 2018-02-27 PROCEDURE — 36415 COLL VENOUS BLD VENIPUNCTURE: CPT

## 2018-02-27 PROCEDURE — 93005 ELECTROCARDIOGRAM TRACING: CPT

## 2018-02-27 PROCEDURE — 87804 INFLUENZA ASSAY W/OPTIC: CPT

## 2018-02-27 RX ORDER — PRASUGREL 10 MG/1
10 TABLET, FILM COATED ORAL DAILY
Status: DISCONTINUED | OUTPATIENT
Start: 2018-02-28 | End: 2018-02-28 | Stop reason: HOSPADM

## 2018-02-27 RX ADMIN — ASPIRIN 81 MG CHEWABLE TABLET 81 MG: 81 TABLET CHEWABLE at 08:21

## 2018-02-27 RX ADMIN — ATORVASTATIN CALCIUM 40 MG: 40 TABLET, FILM COATED ORAL at 23:00

## 2018-02-27 RX ADMIN — ENOXAPARIN SODIUM 40 MG: 40 INJECTION SUBCUTANEOUS at 17:59

## 2018-02-27 RX ADMIN — LISINOPRIL 2.5 MG: 2.5 TABLET ORAL at 08:20

## 2018-02-27 RX ADMIN — Medication 10 ML: at 23:00

## 2018-02-27 RX ADMIN — CLOPIDOGREL BISULFATE 75 MG: 75 TABLET ORAL at 08:21

## 2018-02-27 RX ADMIN — IBUPROFEN 600 MG: 600 TABLET, FILM COATED ORAL at 17:59

## 2018-02-27 RX ADMIN — ATENOLOL 25 MG: 25 TABLET ORAL at 08:21

## 2018-02-27 RX ADMIN — IBUPROFEN 600 MG: 600 TABLET, FILM COATED ORAL at 02:31

## 2018-02-27 ASSESSMENT — PAIN SCALES - GENERAL
PAINLEVEL_OUTOF10: 0
PAINLEVEL_OUTOF10: 5
PAINLEVEL_OUTOF10: 0
PAINLEVEL_OUTOF10: 5
PAINLEVEL_OUTOF10: 0
PAINLEVEL_OUTOF10: 0

## 2018-02-27 NOTE — PROGRESS NOTES
Assumed care of pt at 1900. Pt A/O, able to voice needs and wants. Denies chest pain at present. States he only has chronic arthritic pain in his wrist which he takes medications for. IVF infusing as per order. VSS. Monitor.

## 2018-02-27 NOTE — PROGRESS NOTES
Pt resting in bed watching tv. Tolerated HS med well. Vitals remains stable. Pt remains free of chest pain. Monitor.

## 2018-02-27 NOTE — CARE COORDINATION
daughters and a sister who help out occasionally if he needs them. Reports that he is independent with all ADLs. No immediate needs noted. Will follow upon discharge as indicated. Future Appointments  Date Time Provider Kiara Rome   3/29/2018 2:45 PM Casimiro Gottron, APRN [facility]   8/1/2018 2:45 PM JEFF Martinez [facility]   2/7/2019 2:30 PM MD Herbert Levy [facility] Maintenance  There are no preventive care reminders to display for this patient.     Alexx Kessler RN

## 2018-02-28 ENCOUNTER — TELEPHONE (OUTPATIENT)
Dept: CARDIOLOGY | Age: 63
End: 2018-02-28

## 2018-02-28 VITALS
TEMPERATURE: 96.5 F | HEIGHT: 66 IN | OXYGEN SATURATION: 96 % | SYSTOLIC BLOOD PRESSURE: 133 MMHG | BODY MASS INDEX: 23.61 KG/M2 | DIASTOLIC BLOOD PRESSURE: 73 MMHG | HEART RATE: 64 BPM | RESPIRATION RATE: 16 BRPM | WEIGHT: 146.9 LBS

## 2018-02-28 LAB
BILIRUBIN URINE: NEGATIVE
BLOOD, URINE: NEGATIVE
CLARITY: CLEAR
COLOR: NORMAL
EKG P AXIS: NORMAL DEGREES
EKG P-R INTERVAL: NORMAL MS
EKG Q-T INTERVAL: 406 MS
EKG QRS DURATION: 98 MS
EKG QTC CALCULATION (BAZETT): 407 MS
EKG T AXIS: 94 DEGREES
GLUCOSE URINE: NEGATIVE MG/DL
KETONES, URINE: NEGATIVE MG/DL
LEUKOCYTE ESTERASE, URINE: NEGATIVE
NITRITE, URINE: NEGATIVE
PH UA: 6
PROTEIN UA: NEGATIVE MG/DL
SPECIFIC GRAVITY UA: 1.02
UROBILINOGEN, URINE: 1 E.U./DL

## 2018-02-28 PROCEDURE — 99217 PR OBSERVATION CARE DISCHARGE MANAGEMENT: CPT | Performed by: INTERNAL MEDICINE

## 2018-02-28 PROCEDURE — 2580000003 HC RX 258: Performed by: INTERNAL MEDICINE

## 2018-02-28 PROCEDURE — 81003 URINALYSIS AUTO W/O SCOPE: CPT

## 2018-02-28 PROCEDURE — 93005 ELECTROCARDIOGRAM TRACING: CPT

## 2018-02-28 PROCEDURE — G0378 HOSPITAL OBSERVATION PER HR: HCPCS

## 2018-02-28 PROCEDURE — 6370000000 HC RX 637 (ALT 250 FOR IP): Performed by: INTERNAL MEDICINE

## 2018-02-28 RX ORDER — LISINOPRIL 2.5 MG/1
2.5 TABLET ORAL 2 TIMES DAILY
Status: DISCONTINUED | OUTPATIENT
Start: 2018-02-28 | End: 2018-02-28 | Stop reason: HOSPADM

## 2018-02-28 RX ORDER — PRASUGREL 10 MG/1
10 TABLET, FILM COATED ORAL DAILY
Qty: 30 TABLET | Refills: 5 | Status: SHIPPED | OUTPATIENT
Start: 2018-03-01 | End: 2018-04-04

## 2018-02-28 RX ORDER — LISINOPRIL 2.5 MG/1
2.5 TABLET ORAL 2 TIMES DAILY
Qty: 60 TABLET | Refills: 3 | Status: SHIPPED | OUTPATIENT
Start: 2018-02-28 | End: 2018-08-03 | Stop reason: SDUPTHER

## 2018-02-28 RX ADMIN — Medication 10 ML: at 08:33

## 2018-02-28 RX ADMIN — IBUPROFEN 600 MG: 600 TABLET, FILM COATED ORAL at 09:53

## 2018-02-28 RX ADMIN — LISINOPRIL 2.5 MG: 2.5 TABLET ORAL at 00:40

## 2018-02-28 RX ADMIN — LISINOPRIL 2.5 MG: 2.5 TABLET ORAL at 08:33

## 2018-02-28 RX ADMIN — ASPIRIN 81 MG CHEWABLE TABLET 81 MG: 81 TABLET CHEWABLE at 08:33

## 2018-02-28 RX ADMIN — PRASUGREL 10 MG: 10 TABLET, FILM COATED ORAL at 08:33

## 2018-02-28 RX ADMIN — ATENOLOL 25 MG: 25 TABLET ORAL at 08:33

## 2018-02-28 ASSESSMENT — PAIN SCALES - GENERAL
PAINLEVEL_OUTOF10: 0
PAINLEVEL_OUTOF10: 1

## 2018-02-28 NOTE — TELEPHONE ENCOUNTER
Pt has come into the office. He is stating that [facility] can give him a generic to effient (is there a generic?) but they cannot do that unless they have a prescription from Dr Mundo Franco specifically prescribing the generic medication. What options does the patient have? Is Generic available?  Or does he need something different or is there an assistance program?

## 2018-02-28 NOTE — PROGRESS NOTES
[facility]  Progress Note                            Date:  2/28/2018  Patient: Erna Mcintosh  Admission:  2/26/2018 10:02 AM  Admit DX: Chest pain [R07.9]  Age:  61 y.o., 1955     LOS: 0 days     Reason for evaluation:   Cad with recent stent placement  Admitted with chest pain  p2y12 228-non inhibited-changed to effient      SUBJECTIVE:    The patient was seen and examined. Notes and labs reviewed. There were not complications over night. Except one temp elevation  Patient's cardiac review of systems: regular rate and rhythm  The patient has had some mild diarrhea. OBJECTIVE:    Telemetry: atrial paced  Breath sounds clear  No edema    Was changed to effient yesterday however patient did not receive dose until today       lisinopril  2.5 mg Oral BID    prasugrel  10 mg Oral Daily    aspirin  81 mg Oral Daily    atenolol  25 mg Oral Daily    atorvastatin  40 mg Oral Nightly    sodium chloride flush  10 mL Intravenous 2 times per day    enoxaparin  40 mg Subcutaneous Q24H    ibuprofen  600 mg Oral Q8H               /73   Pulse 65   Temp 96.5 °F (35.8 °C) (Temporal)   Resp 16   Ht 5' 6\" (1.676 m)   Wt 146 lb 14.4 oz (66.6 kg)   SpO2 96%   BMI 23.71 kg/m²     Intake/Output Summary (Last 24 hours) at 02/28/18 0901  Last data filed at 02/28/18 0200   Gross per 24 hour   Intake             1400 ml   Output              650 ml   Net              750 ml           Labs:   CBC:   Recent Labs      02/27/18   0512  02/27/18 1958   WBC  5.3  5.4   HGB  11.3*  10.8*   HCT  34.0*  32.0*   PLT  193  187     BMP: Recent Labs      02/26/18   1006   NA  136   K  3.8   CO2  23   BUN  20   CREATININE  1.1   LABGLOM  >60   GLUCOSE  117*     BNP: No results for input(s): BNP in the last 72 hours. PT/INR: No results for input(s): PROTIME, INR in the last 72 hours. APTT:No results for input(s): APTT in the last 72 hours.   CARDIAC ENZYMES:  Recent Labs      02/26/18   8092

## 2018-02-28 NOTE — DISCHARGE SUMMARY
[facility] Discharge Summary    Patient ID: Huong Villafana   1955    Patient's PCP: JEFF Austin    Admit Date: 2/26/2018     Discharge Date:  2-    Admitting Physician: Ben Campbell MD     Discharge Physician: Ben Campbell MD     Discharge Diagnoses:  Noncardiac chest pain     Active Hospital Problems    Diagnosis Date Noted    Chest pain [R07.9] 02/26/2018     Priority: High       The patient was seen and examined on day of discharge and this discharge summary is in conjunction with any daily progress note from day of discharge. Hospital Course: The patient is a 59-year-old white male who presented for the evaluation of chest pain. The patient had been hospitalized approximately 2 weeks ago. There he had a subendocardial myocardial infarction and stenting of the left anterior descending left anterior descending diagonal. The patient had done well since his discharge. The morning of admission the patient developed mild nausea with chest discomfort. The patient had been using large amounts of nicotine gum. The patient was admitted and had no further chest pain his P2y12 level did not represent full activity/inhibition patient is up ambulating without problems he will keep his previous appointment in our office. He has been switched from Plavix to Effient      Consults:   IP CONSULT TO CARDIOLOGY        Disposition:  Home with Effient-follow-up as planned     Discharge Instructions/Follow-up:  See separate instructions. Activity and Diet: as directed per discharge instructions. Labs:  For convenience and continuity at follow-up the following most recent labs are provided:  CBC:    Lab Results   Component Value Date    WBC 5.4 02/27/2018    HGB 10.8 02/27/2018    HCT 32.0 02/27/2018    HCT 38.0 05/27/2012     02/27/2018     05/27/2012       Renal:    Lab Results   Component Value Date     02/26/2018     05/27/2012    K 3.8 02/26/2018    K 4.0

## 2018-02-28 NOTE — PLAN OF CARE
Problem: Falls - Risk of  Goal: Absence of falls  Outcome: Ongoing      Problem: SAFETY  Goal: Free from accidental physical injury  Outcome: Ongoing

## 2018-03-01 ENCOUNTER — CARE COORDINATION (OUTPATIENT)
Dept: CASE MANAGEMENT | Age: 63
End: 2018-03-01

## 2018-03-01 DIAGNOSIS — I25.10 CORONARY ARTERY DISEASE INVOLVING NATIVE CORONARY ARTERY OF NATIVE HEART WITHOUT ANGINA PECTORIS: Primary | ICD-10-CM

## 2018-03-01 PROCEDURE — 1111F DSCHRG MED/CURRENT MED MERGE: CPT | Performed by: NURSE PRACTITIONER

## 2018-03-04 LAB
BLOOD CULTURE, ROUTINE: NORMAL
CULTURE, BLOOD 2: NORMAL

## 2018-03-07 ENCOUNTER — CARE COORDINATION (OUTPATIENT)
Dept: CASE MANAGEMENT | Age: 63
End: 2018-03-07

## 2018-03-07 NOTE — CARE COORDINATION
[facility] Transitions Follow Up Call    3/7/2018    Patient: Payton Saxena  Patient : 1955   MRN: [id number]  Reason for Admission: There are no discharge diagnoses documented for the most recent discharge. Discharge Date: 18 RARS: Risk Score: 13.25       Spoke with: office nurse    Care Transitions Subsequent and Final Call    Subsequent and Final Calls  Care Transitions Interventions  Other Interventions: Follow Up: Spoke yesterday with office nurse at PCP office. Informed of patient's request for Xanax to aid in sleeping. Informed that he had been taking some that belonged to his ex wife as reported. She said patient had not been in for an office visit in [facility]. Did inform her that patient has been in the hospital and probably needs a hospital follow up appointment. She reported t hat she will check with provider and see what he wants to do. She will then advise patient.    Future Appointments  Date Time Provider Kiara Rome   3/29/2018 2:45 PM JEFF Murphy [facility]   2018 2:45 PM JEFF Saunders [facility]   2019 2:30 PM Etienne Zuniga MD [facility]       Isael Loya RN

## 2018-03-07 NOTE — CARE COORDINATION
[facility] Transitions Follow Up Call    3/7/2018    Patient: Huong Villafana  Patient : 1955   MRN: [id number]  Reason for Admission: There are no discharge diagnoses documented for the most recent discharge. Discharge Date: 18 RARS: Risk Score: 13.25       Spoke with:  Purple Transitions Subsequent and Final Call    Subsequent and Final Calls  Do you have any ongoing symptoms?:  No  Have your medications changed?:  No  Do you have any questions related to your medications?:  No  Do you currently have any active services?:  No  Do you have any needs or concerns that I can assist you with?:  Yes  Patient-reported Needs or Concerns:  See CC note  Identified Barriers:  None  Care Transitions Interventions  Other Interventions: Follow Up: Spoke with patient yesterday and he reported that he is improving day by day. Denied any abnormal signs or symptoms and reported that he is up and about and doing well. Stated that appetite is not too bad, eating and drinking ok. Did acknowledge that he had received the Effient and is doing well on it without any complications. Did report that he is not sleeping well and asked for something for it. When I offered to call the PCP to ask for a sleeper, he reported that he would prefer Xanax. He reported that his ex wife takes it on a regular basis and a couple of times, he has gotten one or two from her and has used them when he is really having a hard time and they do seem to help him to sleep well. He asked that I call and see if they will send those in for him. Did inform him that I would but that the provider may have to see him before prescribing anything. No other needs reported.     Future Appointments  Date Time Provider Kiara Rome   3/29/2018 2:45 PM JEFF Cunha [facility]   2018 2:45 PM JEFF Thayer [facility]   2019 2:30 PM Ben Campbell MD [facility]       Zuly Wilkins RN

## 2018-03-10 ENCOUNTER — CARE COORDINATION (OUTPATIENT)
Dept: CASE MANAGEMENT | Age: 63
End: 2018-03-10

## 2018-03-12 ENCOUNTER — CARE COORDINATION (OUTPATIENT)
Dept: CASE MANAGEMENT | Age: 63
End: 2018-03-12

## 2018-03-29 ENCOUNTER — OFFICE VISIT (OUTPATIENT)
Dept: CARDIOLOGY | Age: 63
End: 2018-03-29
Payer: MEDICARE

## 2018-03-29 ENCOUNTER — TELEPHONE (OUTPATIENT)
Dept: CARDIOLOGY | Age: 63
End: 2018-03-29

## 2018-03-29 VITALS
SYSTOLIC BLOOD PRESSURE: 128 MMHG | BODY MASS INDEX: 24.27 KG/M2 | HEIGHT: 66 IN | DIASTOLIC BLOOD PRESSURE: 64 MMHG | WEIGHT: 151 LBS | HEART RATE: 70 BPM

## 2018-03-29 DIAGNOSIS — Z72.0 TOBACCO ABUSE: ICD-10-CM

## 2018-03-29 DIAGNOSIS — I25.10 CORONARY ARTERY DISEASE INVOLVING NATIVE CORONARY ARTERY OF NATIVE HEART WITHOUT ANGINA PECTORIS: Primary | ICD-10-CM

## 2018-03-29 DIAGNOSIS — E78.2 MIXED HYPERLIPIDEMIA: ICD-10-CM

## 2018-03-29 PROCEDURE — 3017F COLORECTAL CA SCREEN DOC REV: CPT | Performed by: CLINICAL NURSE SPECIALIST

## 2018-03-29 PROCEDURE — G8427 DOCREV CUR MEDS BY ELIG CLIN: HCPCS | Performed by: CLINICAL NURSE SPECIALIST

## 2018-03-29 PROCEDURE — 4004F PT TOBACCO SCREEN RCVD TLK: CPT | Performed by: CLINICAL NURSE SPECIALIST

## 2018-03-29 PROCEDURE — G8420 CALC BMI NORM PARAMETERS: HCPCS | Performed by: CLINICAL NURSE SPECIALIST

## 2018-03-29 PROCEDURE — G8598 ASA/ANTIPLAT THER USED: HCPCS | Performed by: CLINICAL NURSE SPECIALIST

## 2018-03-29 PROCEDURE — G8482 FLU IMMUNIZE ORDER/ADMIN: HCPCS | Performed by: CLINICAL NURSE SPECIALIST

## 2018-03-29 PROCEDURE — 99213 OFFICE O/P EST LOW 20 MIN: CPT | Performed by: CLINICAL NURSE SPECIALIST

## 2018-03-29 ASSESSMENT — ENCOUNTER SYMPTOMS
COUGH: 0
HEARTBURN: 0
NAUSEA: 0
BLURRED VISION: 0
ORTHOPNEA: 0
VOMITING: 0
SHORTNESS OF BREATH: 0

## 2018-03-29 NOTE — PATIENT INSTRUCTIONS
inhaler  · Ask your doctor about bupropion (Wellbutrin) or varenicline (Chantix), which are prescription medicines. They do not contain nicotine. They help you by reducing withdrawal symptoms, such as stress and anxiety. · Some people find hypnosis, acupuncture, and massage helpful for ending the smoking habit. · Eat a healthy diet and get regular exercise. Having healthy habits will help your body move past its craving for nicotine. · Be prepared to keep trying. Most people are not successful the first few times they try to quit. Do not get mad at yourself if you smoke again. Make a list of things you learned and think about when you want to try again, such as next week, next month, or next year. Where can you learn more? Go to https://FamilyLink.Borderfree. org and sign in to your [employer] account. Enter U443 in the StandDesk box to learn more about \"Stopping Smoking: Care Instructions. \"     If you do not have an account, please click on the \"Sign Up Now\" link. Current as of: March 20, 2017  Content Version: 11.5  © 4901-9078 [employer]wise, Incorporated. Care instructions adapted under license by [facility] ([facility]). If you have questions about a medical condition or this instruction, always ask your healthcare professional. [address] any warranty or liability for your use of this information.

## 2018-03-29 NOTE — PROGRESS NOTES
Does not bruise/bleed easily. Psychiatric/Behavioral: Negative for depression. The patient is not nervous/anxious. Objective  Vital Signs - /64   Pulse 70   Ht 5' 6\" (1.676 m)   Wt 151 lb (68.5 kg)   BMI 24.37 kg/m²   Physical Exam   Constitutional: He is oriented to person, place, and time. He appears well-developed and well-nourished. No distress. HENT:   Head: Normocephalic and atraumatic. Eyes: Pupils are equal, round, and reactive to light. Right eye exhibits no discharge. Left eye exhibits no discharge. Neck: No JVD present. No tracheal deviation present. Cardiovascular: Normal rate, regular rhythm, normal heart sounds and intact distal pulses. Exam reveals no gallop and no friction rub. No murmur heard. No carotid bruit  Small knot, right groin  Right foot pedal pulses are palpable 2+, foot warm, capillary refill 3 seconds   Pulmonary/Chest: Effort normal and breath sounds normal. No respiratory distress. He has no wheezes. He has no rales. Abdominal: Soft. There is no tenderness. Musculoskeletal: He exhibits no edema. Normal gait and station   Neurological: He is alert and oriented to person, place, and time. No cranial nerve deficit. Skin: Skin is warm and dry. No rash noted. Right groin hematoma is resolved   Psychiatric: He has a normal mood and affect. His behavior is normal. Judgment normal.   Nursing note and vitals reviewed. Assessment:    1. Coronary artery disease involving native coronary artery of native heart without angina pectoris   That is post 2 recent non-STEMI's and inhibition to Plavix. Patient is now taking Effient and doing well. He states he has turned paperwork into our office for medication assistance this week. He was given 2 bottles of Effient via heart [place]    2.  Tobacco abuse   Initially stop smoking after his hospitalization but has started back  Instructed patient in smoking cessation rationales, strategies, and available resources x

## 2018-04-04 DIAGNOSIS — I25.10 CORONARY ARTERY DISEASE INVOLVING NATIVE CORONARY ARTERY OF NATIVE HEART WITHOUT ANGINA PECTORIS: Primary | ICD-10-CM

## 2018-05-04 ENCOUNTER — TELEPHONE (OUTPATIENT)
Dept: CARDIOLOGY | Age: 63
End: 2018-05-04

## 2018-05-04 DIAGNOSIS — Z95.0 PACEMAKER: Primary | ICD-10-CM

## 2018-05-04 DIAGNOSIS — I49.5 SINOATRIAL NODE DYSFUNCTION (HCC): ICD-10-CM

## 2018-05-04 PROCEDURE — 93294 REM INTERROG EVL PM/LDLS PM: CPT | Performed by: CLINICAL NURSE SPECIALIST

## 2018-05-04 PROCEDURE — 93296 REM INTERROG EVL PM/IDS: CPT | Performed by: CLINICAL NURSE SPECIALIST

## 2018-05-12 DIAGNOSIS — I10 ESSENTIAL HYPERTENSION: ICD-10-CM

## 2018-05-14 RX ORDER — ATENOLOL 25 MG/1
TABLET ORAL
Qty: 30 TABLET | Refills: 5 | Status: SHIPPED | OUTPATIENT
Start: 2018-05-14 | End: 2018-11-10 | Stop reason: SDUPTHER

## 2018-06-19 DIAGNOSIS — I65.23 BILATERAL CAROTID ARTERY STENOSIS: Primary | ICD-10-CM

## 2018-06-19 RX ORDER — ATORVASTATIN CALCIUM 40 MG/1
TABLET, FILM COATED ORAL
Qty: 30 TABLET | Refills: 5 | Status: SHIPPED | OUTPATIENT
Start: 2018-06-19 | End: 2018-12-27 | Stop reason: SDUPTHER

## 2018-07-22 ENCOUNTER — HOSPITAL ENCOUNTER (EMERGENCY)
Age: 63
Discharge: HOME | End: 2018-07-22
Attending: FAMILY MEDICINE
Payer: MEDICARE

## 2018-07-22 VITALS
DIASTOLIC BLOOD PRESSURE: 66 MMHG | HEIGHT: 65 IN | BODY MASS INDEX: 22.49 KG/M2 | SYSTOLIC BLOOD PRESSURE: 154 MMHG | WEIGHT: 135 LBS | TEMPERATURE: 98.6 F | RESPIRATION RATE: 18 BRPM | HEART RATE: 66 BPM | OXYGEN SATURATION: 98 %

## 2018-07-22 DIAGNOSIS — T14.8XXA HEMATOMA: Primary | ICD-10-CM

## 2018-07-22 LAB
ALBUMIN SERPL-MCNC: 4.1 G/DL (ref 3.5–5.2)
ALP BLD-CCNC: 51 U/L (ref 40–130)
ALT SERPL-CCNC: 79 U/L (ref 5–41)
ANION GAP SERPL CALCULATED.3IONS-SCNC: 14 MMOL/L (ref 7–19)
APTT: 35.7 SEC (ref 26–36.2)
AST SERPL-CCNC: 104 U/L (ref 5–40)
BASOPHILS ABSOLUTE: 0 K/UL (ref 0–0.2)
BASOPHILS RELATIVE PERCENT: 0.9 % (ref 0–1)
BILIRUB SERPL-MCNC: 0.4 MG/DL (ref 0.2–1.2)
BUN BLDV-MCNC: 17 MG/DL (ref 8–23)
CALCIUM SERPL-MCNC: 9.6 MG/DL (ref 8.8–10.2)
CHLORIDE BLD-SCNC: 103 MMOL/L (ref 98–111)
CO2: 20 MMOL/L (ref 22–29)
CREAT SERPL-MCNC: 0.7 MG/DL (ref 0.5–1.2)
EOSINOPHILS ABSOLUTE: 0.2 K/UL (ref 0–0.6)
EOSINOPHILS RELATIVE PERCENT: 5.2 % (ref 0–5)
GFR NON-AFRICAN AMERICAN: >60
GLUCOSE BLD-MCNC: 85 MG/DL (ref 74–109)
HCT VFR BLD CALC: 39.7 % (ref 42–52)
HEMOGLOBIN: 13.5 G/DL (ref 14–18)
INR BLD: 0.97 (ref 0.88–1.18)
LYMPHOCYTES ABSOLUTE: 1.1 K/UL (ref 1.1–4.5)
LYMPHOCYTES RELATIVE PERCENT: 32.4 % (ref 20–40)
MCH RBC QN AUTO: 30.4 PG (ref 27–31)
MCHC RBC AUTO-ENTMCNC: 34 G/DL (ref 33–37)
MCV RBC AUTO: 89.4 FL (ref 80–94)
MONOCYTES ABSOLUTE: 0.4 K/UL (ref 0–0.9)
MONOCYTES RELATIVE PERCENT: 11.7 % (ref 0–10)
NEUTROPHILS ABSOLUTE: 1.7 K/UL (ref 1.5–7.5)
NEUTROPHILS RELATIVE PERCENT: 49.8 % (ref 50–65)
PDW BLD-RTO: 13.5 % (ref 11.5–14.5)
PLATELET # BLD: 139 K/UL (ref 130–400)
PMV BLD AUTO: 9.2 FL (ref 9.4–12.4)
POTASSIUM SERPL-SCNC: 4.5 MMOL/L (ref 3.5–5)
PROTHROMBIN TIME: 12.8 SEC (ref 12–14.6)
RBC # BLD: 4.44 M/UL (ref 4.7–6.1)
SODIUM BLD-SCNC: 137 MMOL/L (ref 136–145)
TOTAL PROTEIN: 8.3 G/DL (ref 6.6–8.7)
WBC # BLD: 3.4 K/UL (ref 4.8–10.8)

## 2018-07-22 PROCEDURE — 93926 LOWER EXTREMITY STUDY: CPT

## 2018-07-22 PROCEDURE — 80053 COMPREHEN METABOLIC PANEL: CPT

## 2018-07-22 PROCEDURE — 99283 EMERGENCY DEPT VISIT LOW MDM: CPT

## 2018-07-22 PROCEDURE — 99283 EMERGENCY DEPT VISIT LOW MDM: CPT | Performed by: FAMILY MEDICINE

## 2018-07-22 PROCEDURE — 85025 COMPLETE CBC W/AUTO DIFF WBC: CPT

## 2018-07-22 PROCEDURE — 93922 UPR/L XTREMITY ART 2 LEVELS: CPT

## 2018-07-22 PROCEDURE — 36415 COLL VENOUS BLD VENIPUNCTURE: CPT

## 2018-07-22 PROCEDURE — 85610 PROTHROMBIN TIME: CPT

## 2018-07-22 PROCEDURE — 85730 THROMBOPLASTIN TIME PARTIAL: CPT

## 2018-07-22 RX ORDER — DOXYCYCLINE HYCLATE 100 MG
100 TABLET ORAL 2 TIMES DAILY
Qty: 20 TABLET | Refills: 0 | Status: SHIPPED | OUTPATIENT
Start: 2018-07-22 | End: 2018-08-01

## 2018-07-22 ASSESSMENT — ENCOUNTER SYMPTOMS
SHORTNESS OF BREATH: 0
ABDOMINAL PAIN: 0
NAUSEA: 0
SORE THROAT: 0
COLOR CHANGE: 0
COUGH: 0
VOMITING: 0
WHEEZING: 0
BACK PAIN: 0
DIARRHEA: 0

## 2018-07-22 NOTE — PROGRESS NOTES
Right groin scan performed. No pseudo, no hematoma, no fistula, no dvt noted in right lucio. RT CFA: 98cm/s  RT PFA: 109cm/s  RT SFA: 90cm/s    Bilateral Ankle Brachial Indices performed per protocol. RT ALFRED:   0.96                      LT ALFRED:1.02    DIGIT:0.45      DIGIT:0.67    This is a preliminary report.  Final report pending

## 2018-07-22 NOTE — ED PROVIDER NOTES
 Osteoarthritis     Pacemaker 5/22/2013    loop recorder removed    S/P carotid endarterectomy     right internal carotid    Sinoatrial node dysfunction ([address] 2/15/2018    3/5/13  loop recorder 5/22/2013  Dual chamber PPM    Tobacco abuse          SURGICAL HISTORY       Past Surgical History:   Procedure Laterality Date    CORONARY ANGIOPLASTY WITH STENT PLACEMENT  02/15/2018    SP prox LAD and Osteal diagonal    CORONARY ANGIOPLASTY WITH STENT PLACEMENT  02/26/2018    SP to osteal    PACEMAKER INSERTION  5/22/2013    VASCULAR SURGERY  2-27-13 [facility]    Right Carotid endarterectomy, eversion technique with completion duplex US         CURRENT MEDICATIONS       Previous Medications    ASPIRIN 81 MG CHEWABLE TABLET    Take 1 tablet by mouth daily    ATENOLOL (TENORMIN) 25 MG TABLET    TAKE 1 TABLET BY MOUTH DAILY    ATORVASTATIN (LIPITOR) 40 MG TABLET    TAKE ONE TABLET BY MOUTH NIGHTLY    LISINOPRIL (PRINIVIL;ZESTRIL) 2.5 MG TABLET    Take 1 tablet by mouth 2 times daily    TICAGRELOR (BRILINTA) 90 MG TABS TABLET    Take 1 tablet by mouth 2 times daily       ALLERGIES     Codeine    FAMILY HISTORY       Family History   Problem Relation Age of Onset    Diabetes Mother     Heart Disease Father     High Blood Pressure Father     Diabetes Father     Stroke Father           SOCIAL HISTORY       Social History     Social History    Marital status:      Spouse name: N/A    Number of children: N/A    Years of education: N/A     Social History Main Topics    Smoking status: Current Every Day Smoker     Packs/day: 1.50     Years: 50.00     Types: Cigarettes    Smokeless tobacco: Former User     Types: Chew    Alcohol use No      Comment: 1-2 beers a daily Weekend Drinker at times   [place] Drug use: No      Comment: Past Use    Sexual activity: No     Other Topics Concern    Not on file     Social History Narrative    No narrative on file       SCREENINGS             PHYSICAL EXAM    (up to 7 for level 4, 8 or more for level 5)     ED Triage Vitals [07/22/18 1147]   BP Temp Temp src Pulse Resp SpO2 Height Weight   (!) 157/68 98.6 °F (37 °C) -- 65 17 97 % 5' 5\" (1.651 m) 135 lb (61.2 kg)       Physical Exam   Constitutional: He is oriented to person, place, and time. He appears well-developed and well-nourished. HENT:   Head: Normocephalic. Right Ear: External ear normal.   Eyes: Pupils are equal, round, and reactive to light. Neck: Normal range of motion. Neck supple. Cardiovascular: Normal rate and normal heart sounds. Pulmonary/Chest: Effort normal and breath sounds normal. He has no wheezes. Abdominal: Soft. Bowel sounds are normal. He exhibits no distension. There is no tenderness. There is no rebound and no guarding. Musculoskeletal: Normal range of motion. He exhibits no edema. Right hip: He exhibits tenderness. Legs:  Neurological: He is alert and oriented to person, place, and time. Skin: Skin is warm and dry. No rash noted. No erythema. No pallor.    Psychiatric: His behavior is normal.       DIAGNOSTIC RESULTS     EKG: All EKG's are interpreted by the Emergency Department Physician who either signs or Co-signs this chart in the absence of a cardiologist.        RADIOLOGY:   Non-plain film images such as CT, Ultrasound and MRI are read by the radiologist. Plain radiographic images are visualized and preliminarily interpreted by the emergency physician with the below findings:          VL ALFRED BILATERAL LIMITED 1-2 LEVELS         VL DUP LOWER EXTREMITY ARTERIES RIGHT                 LABS:  Labs Reviewed   CBC WITH AUTO DIFFERENTIAL - Abnormal; Notable for the following:        Result Value    WBC 3.4 (*)     RBC 4.44 (*)     Hemoglobin 13.5 (*)     Hematocrit 39.7 (*)     MPV 9.2 (*)     Neutrophils % 49.8 (*)     Monocytes % 11.7 (*)     Eosinophils % 5.2 (*)     All other components within normal limits   COMPREHENSIVE METABOLIC PANEL - Abnormal; Notable for the

## 2018-07-30 ENCOUNTER — TELEPHONE (OUTPATIENT)
Dept: CARDIOLOGY | Age: 63
End: 2018-07-30

## 2018-07-30 NOTE — TELEPHONE ENCOUNTER
TL out of office. Left v/m for Pt to reschedule appt on 8/1.  Pt needs to be rescheduled with an NP at later date at main office or [facility]

## 2018-08-03 RX ORDER — LISINOPRIL 2.5 MG/1
2.5 TABLET ORAL 2 TIMES DAILY
Qty: 60 TABLET | Refills: 3 | Status: SHIPPED | OUTPATIENT
Start: 2018-08-03 | End: 2018-12-12 | Stop reason: SDUPTHER

## 2018-09-13 ENCOUNTER — OFFICE VISIT (OUTPATIENT)
Dept: CARDIOLOGY | Age: 63
End: 2018-09-13
Payer: MEDICARE

## 2018-09-13 VITALS
HEART RATE: 76 BPM | HEIGHT: 66 IN | SYSTOLIC BLOOD PRESSURE: 118 MMHG | DIASTOLIC BLOOD PRESSURE: 68 MMHG | BODY MASS INDEX: 24.27 KG/M2 | WEIGHT: 151 LBS

## 2018-09-13 DIAGNOSIS — I49.5 SINOATRIAL NODE DYSFUNCTION (HCC): ICD-10-CM

## 2018-09-13 DIAGNOSIS — Z95.0 PACEMAKER: ICD-10-CM

## 2018-09-13 DIAGNOSIS — I25.10 CORONARY ARTERY DISEASE INVOLVING NATIVE CORONARY ARTERY OF NATIVE HEART WITHOUT ANGINA PECTORIS: Primary | ICD-10-CM

## 2018-09-13 PROCEDURE — G8420 CALC BMI NORM PARAMETERS: HCPCS | Performed by: CLINICAL NURSE SPECIALIST

## 2018-09-13 PROCEDURE — G8598 ASA/ANTIPLAT THER USED: HCPCS | Performed by: CLINICAL NURSE SPECIALIST

## 2018-09-13 PROCEDURE — 4004F PT TOBACCO SCREEN RCVD TLK: CPT | Performed by: CLINICAL NURSE SPECIALIST

## 2018-09-13 PROCEDURE — 99213 OFFICE O/P EST LOW 20 MIN: CPT | Performed by: CLINICAL NURSE SPECIALIST

## 2018-09-13 PROCEDURE — 3017F COLORECTAL CA SCREEN DOC REV: CPT | Performed by: CLINICAL NURSE SPECIALIST

## 2018-09-13 PROCEDURE — 93280 PM DEVICE PROGR EVAL DUAL: CPT | Performed by: CLINICAL NURSE SPECIALIST

## 2018-09-13 PROCEDURE — G8427 DOCREV CUR MEDS BY ELIG CLIN: HCPCS | Performed by: CLINICAL NURSE SPECIALIST

## 2018-09-13 NOTE — PROGRESS NOTES
[facility], [address] [facility] [address] [place] [facility] [place], [address] [id number]  Phone: [phone]  Fax: [phone]    OFFICE VISIT:  2018    Noam Ruiz - : 1955    Reason For Visit:  Jim Wilson is a 61 y.o. male who is here for 6 Month Follow-Up (Having problems with bleeding. ); Coronary Artery Disease; and Hypertension  Heart catheterization 2/15/18  1.  Successful femoral artery ultrasound  2.  Successful femoral artery arterogram  3.  Single vessel coronary artery disease involving the proximal   LAD and osteal diagonal  4.  Left ventricular function is normal   5.  90% lesion in the proximal LAD  6.  Successful percutaneous coronary intervention utilizing a   single drug eluding stent to the proximal LAD. 5.  80% lesion in the osteal diagonal  6.  Successful percutaneous coronary intervention utilizing a   single drug eluding stent to the osteal diagonal.    Subjective  Jim Wilson denies exertional chest pain, shortness of breath, orthopnea, paroxysmal nocturnal dyspnea, syncope, presyncope, arrhythmia, edema and fatigue. The patient denies numbness or weakness to suggest cerebrovascular accident or transient ischemic attack. He did scratch his hand the other day and had difficult time getting stop bleeding. His psoriasis was much improved earlier in the year. Here for the last couple months it has gotten much worse on his hands and left arm    He has seen Dr. Tracy Laird in the past for his arthritis. Currently taking CBD oil and this has helped his aches and pains  JEFF Hui is PCP.     [place] Ruiz has the following history as recorded in [facility]:    Patient Active Problem List    Diagnosis Date Noted    Chest pain 2018     Priority: High    Mixed hyperlipidemia 2018    Cigarette nicotine dependence in remission 2018    CAD (coronary artery disease) 02/15/2018    Sinoatrial node dysfunction ([address] 02/15/2018    Tobacco abuse     Pacemaker 2013  Osteoarthritis     Carotid artery stenosis      Past Medical History:   Diagnosis Date    Bradycardia     3/5/13  loop recorder    Carotid artery stenosis     Cigarette nicotine dependence in remission 2/21/2018    Hypoglycemia     Mixed hyperlipidemia 2/21/2018    Near syncope     Osteoarthritis     Pacemaker 05/22/2013    loop recorder removed    S/P carotid endarterectomy     right internal carotid    Sinoatrial node dysfunction ([address] 2/15/2018    3/5/13  loop recorder 5/22/2013  Dual chamber PPM    Tobacco abuse      Past Surgical History:   Procedure Laterality Date    CORONARY ANGIOPLASTY WITH STENT PLACEMENT  02/15/2018    SP prox LAD and Osteal diagonal    CORONARY ANGIOPLASTY WITH STENT PLACEMENT  02/26/2018    SP to osteal    PACEMAKER INSERTION  5/22/2013    VASCULAR SURGERY  2-27-13 [facility]    Right Carotid endarterectomy, eversion technique with completion duplex US     Family History   Problem Relation Age of Onset    Diabetes Mother     Heart Disease Father     High Blood Pressure Father     Diabetes Father     Stroke Father      Social History   Substance Use Topics    Smoking status: Current Every Day Smoker     Packs/day: 1.50     Years: 50.00     Types: Cigarettes    Smokeless tobacco: Former User     Types: Chew    Alcohol use No      Comment: 1-2 beers a daily Weekend Drinker at times      Current Outpatient Prescriptions   Medication Sig Dispense Refill    NONFORMULARY CBC oil      lisinopril (PRINIVIL;ZESTRIL) 2.5 MG tablet Take 1 tablet by mouth 2 times daily 60 tablet 3    atorvastatin (LIPITOR) 40 MG tablet TAKE ONE TABLET BY MOUTH NIGHTLY 30 tablet 5    atenolol (TENORMIN) 25 MG tablet TAKE 1 TABLET BY MOUTH DAILY 30 tablet 5    ticagrelor (BRILINTA) 90 MG TABS tablet Take 1 tablet by mouth 2 times daily 60 tablet 5    aspirin 81 MG chewable tablet Take 1 tablet by mouth daily 30 tablet 3     No current facility-administered medications for this visit. + pedal pulses and no varicosities. Abdominal -  Soft, nontender, nondistended. Bowel sounds are intact. Extremities - No clubbing, cyanosis, or  edema. Musculoskeletal -  No clubbing . No Osler's nodes. Gait normal .  No kyphosis or scoliosis. Skin - multiple tip twos on arms. Hands dry and cracked. Forearms red and patchy with psoriasis plaque noted no statis ulcers or dermatitis. Neurological - No focal signs are identified. Oriented to person, place and time. Psychiatric -  Appropriate affect and mood. Assessment:     Diagnosis Orders   1. Coronary artery disease involving native coronary artery of native heart without angina pectoris     2. Sinoatrial node dysfunction (HCC)     3. Pacemaker       Data:  BP Readings from Last 3 Encounters:   09/13/18 118/68   07/22/18 (!) 154/66   03/29/18 128/64    Pulse Readings from Last 3 Encounters:   09/13/18 76   07/22/18 66   03/29/18 70        Pacemaker check showed adequate battery status-  2.99V   and  appropriate diagnostics without any sustained arrhythmias. Mode AAIR-DDDR  AS-VS 62%  AP- VS 37%  AP-  1%  Next check in 3 months via [employer] home monitoring system    Discussed need for dual antiplatelet therapy for one year post-stenting. This will be February 2019. Suggest follow-up with PCP or get back to specialists for his arthritis and psoriasis. States taking medications as prescribed  Stable cardiovascular status. No evidence of overt heart failure, angina or dysrhythmia. Plan    Do not stop your aspirin or Brilinta for a year post stent  Send [employer] home pacemaker reading on 12-13-18   Follow up in 6 mos With Dr. Jhon Bland  Call with any questions or concerns  Follow up with JEFF Lopes for non cardiac problems  Report any new problems  Cardiovascular Fitness-Exercise as tolerated. Strive for 15 minutes of exercise most days of the week.     Cardiac / Healthy Diet  Continue current medications as

## 2018-09-13 NOTE — PATIENT INSTRUCTIONS
Do not stop your aspirin or Brilinta for a year post stent  Send [employer] home pacemaker reading on 12-13-18   Follow up in 6 mos With Dr. Shante Hameed  Call with any questions or concerns  Follow up with JEFF Rodríguez for non cardiac problems  Report any new problems  Cardiovascular Fitness-Exercise as tolerated. Strive for 15 minutes of exercise most days of the week. Cardiac / Healthy Diet  Continue current medications as directed  Continue plan of treatment  It is always recommended that you bring your medications bottles with you to each visit - this is for your safety!

## 2018-10-12 ENCOUNTER — HOSPITAL ENCOUNTER (EMERGENCY)
Age: 63
Discharge: HOME | End: 2018-10-12
Payer: MEDICARE

## 2018-10-12 ENCOUNTER — APPOINTMENT (OUTPATIENT)
Dept: GENERAL RADIOLOGY | Age: 63
End: 2018-10-12
Payer: MEDICARE

## 2018-10-12 ENCOUNTER — APPOINTMENT (OUTPATIENT)
Dept: CT IMAGING | Age: 63
End: 2018-10-12
Payer: MEDICARE

## 2018-10-12 VITALS
BODY MASS INDEX: 24.11 KG/M2 | HEIGHT: 66 IN | HEART RATE: 62 BPM | SYSTOLIC BLOOD PRESSURE: 153 MMHG | DIASTOLIC BLOOD PRESSURE: 85 MMHG | RESPIRATION RATE: 19 BRPM | WEIGHT: 150 LBS | OXYGEN SATURATION: 93 % | TEMPERATURE: 98.5 F

## 2018-10-12 DIAGNOSIS — R42 DIZZINESS: Primary | ICD-10-CM

## 2018-10-12 DIAGNOSIS — I25.10 CORONARY ARTERY DISEASE INVOLVING NATIVE CORONARY ARTERY OF NATIVE HEART WITHOUT ANGINA PECTORIS: ICD-10-CM

## 2018-10-12 DIAGNOSIS — Z95.0 PACEMAKER: ICD-10-CM

## 2018-10-12 LAB
ALBUMIN SERPL-MCNC: 4.4 G/DL (ref 3.5–5.2)
ALP BLD-CCNC: 52 U/L (ref 40–130)
ALT SERPL-CCNC: 73 U/L (ref 5–41)
ANION GAP SERPL CALCULATED.3IONS-SCNC: 11 MMOL/L (ref 7–19)
AST SERPL-CCNC: 98 U/L (ref 5–40)
BASE EXCESS ARTERIAL: -2.8 MMOL/L (ref -2–2)
BILIRUB SERPL-MCNC: 0.6 MG/DL (ref 0.2–1.2)
BILIRUBIN URINE: NEGATIVE
BLOOD, URINE: NEGATIVE
BUN BLDV-MCNC: 14 MG/DL (ref 8–23)
CALCIUM SERPL-MCNC: 9.8 MG/DL (ref 8.8–10.2)
CARBOXYHEMOGLOBIN ARTERIAL: 1.1 % (ref 0–5)
CHLORIDE BLD-SCNC: 102 MMOL/L (ref 98–111)
CLARITY: CLEAR
CO2: 22 MMOL/L (ref 22–29)
COLOR: YELLOW
CREAT SERPL-MCNC: 0.8 MG/DL (ref 0.5–1.2)
GFR NON-AFRICAN AMERICAN: >60
GLUCOSE BLD-MCNC: 108 MG/DL (ref 74–109)
GLUCOSE URINE: NEGATIVE MG/DL
HCO3 ARTERIAL: 20.6 MMOL/L (ref 22–26)
HCT VFR BLD CALC: 37.1 % (ref 42–52)
HEMOGLOBIN, ART, EXTENDED: 12.6 G/DL (ref 14–18)
HEMOGLOBIN: 12.7 G/DL (ref 14–18)
KETONES, URINE: NEGATIVE MG/DL
LEUKOCYTE ESTERASE, URINE: NEGATIVE
MCH RBC QN AUTO: 31.4 PG (ref 27–31)
MCHC RBC AUTO-ENTMCNC: 34.2 G/DL (ref 33–37)
MCV RBC AUTO: 91.6 FL (ref 80–94)
METHEMOGLOBIN ARTERIAL: 0.9 %
NITRITE, URINE: NEGATIVE
O2 CONTENT ARTERIAL: 17.1 ML/DL
O2 SAT, ARTERIAL: 96.2 %
O2 THERAPY: ABNORMAL
PCO2 ARTERIAL: 31 MMHG (ref 35–45)
PDW BLD-RTO: 13.1 % (ref 11.5–14.5)
PERFORMED ON: NORMAL
PH ARTERIAL: 7.43 (ref 7.35–7.45)
PH UA: 6
PLATELET # BLD: 138 K/UL (ref 130–400)
PMV BLD AUTO: 9.8 FL (ref 9.4–12.4)
PO2 ARTERIAL: 91 MMHG (ref 80–100)
POC TROPONIN I: 0 NG/ML (ref 0–0.08)
POTASSIUM SERPL-SCNC: 4.7 MMOL/L (ref 3.5–5)
POTASSIUM, WHOLE BLOOD: 4.2
PRO-BNP: 775 PG/ML (ref 0–900)
PROTEIN UA: NEGATIVE MG/DL
RBC # BLD: 4.05 M/UL (ref 4.7–6.1)
SODIUM BLD-SCNC: 135 MMOL/L (ref 136–145)
SPECIFIC GRAVITY UA: 1.01
TOTAL PROTEIN: 8.1 G/DL (ref 6.6–8.7)
UROBILINOGEN, URINE: 1 E.U./DL
WBC # BLD: 3.1 K/UL (ref 4.8–10.8)

## 2018-10-12 PROCEDURE — 71045 X-RAY EXAM CHEST 1 VIEW: CPT

## 2018-10-12 PROCEDURE — 82803 BLOOD GASES ANY COMBINATION: CPT

## 2018-10-12 PROCEDURE — 81003 URINALYSIS AUTO W/O SCOPE: CPT

## 2018-10-12 PROCEDURE — 99284 EMERGENCY DEPT VISIT MOD MDM: CPT

## 2018-10-12 PROCEDURE — 84132 ASSAY OF SERUM POTASSIUM: CPT

## 2018-10-12 PROCEDURE — 85027 COMPLETE CBC AUTOMATED: CPT

## 2018-10-12 PROCEDURE — 99284 EMERGENCY DEPT VISIT MOD MDM: CPT | Performed by: NURSE PRACTITIONER

## 2018-10-12 PROCEDURE — 6370000000 HC RX 637 (ALT 250 FOR IP): Performed by: NURSE PRACTITIONER

## 2018-10-12 PROCEDURE — 80053 COMPREHEN METABOLIC PANEL: CPT

## 2018-10-12 PROCEDURE — 83880 ASSAY OF NATRIURETIC PEPTIDE: CPT

## 2018-10-12 PROCEDURE — 36415 COLL VENOUS BLD VENIPUNCTURE: CPT

## 2018-10-12 PROCEDURE — 36600 WITHDRAWAL OF ARTERIAL BLOOD: CPT

## 2018-10-12 PROCEDURE — 84484 ASSAY OF TROPONIN QUANT: CPT

## 2018-10-12 PROCEDURE — 93005 ELECTROCARDIOGRAM TRACING: CPT

## 2018-10-12 PROCEDURE — 70450 CT HEAD/BRAIN W/O DYE: CPT

## 2018-10-12 RX ORDER — MECLIZINE HCL 12.5 MG/1
25 TABLET ORAL 3 TIMES DAILY PRN
Status: DISCONTINUED | OUTPATIENT
Start: 2018-10-12 | End: 2018-10-12 | Stop reason: HOSPADM

## 2018-10-12 RX ORDER — MECLIZINE HYDROCHLORIDE 25 MG/1
25 TABLET ORAL 3 TIMES DAILY PRN
Qty: 30 TABLET | Refills: 0 | Status: SHIPPED | OUTPATIENT
Start: 2018-10-12 | End: 2018-10-22

## 2018-10-12 RX ADMIN — MECLIZINE 25 MG: 12.5 TABLET ORAL at 12:44

## 2018-10-12 ASSESSMENT — ENCOUNTER SYMPTOMS
SHORTNESS OF BREATH: 1
ABDOMINAL PAIN: 0
VISUAL CHANGE: 0
BLOOD IN STOOL: 0
VOMITING: 0

## 2018-10-12 NOTE — PROGRESS NOTES
10/12/2018  1:47 PM - Ray Rahman Incoming Lab Results From [facility]     Component Results     Component Value Ref Range & Units Status Collected Lab   pH, Arterial 7.430  7.350 - 7.450 Final 10/12/2018  1:46 PM [address] [place] [address] Lab   pCO2, Arterial 31.0   35.0 - 45.0 mmHg Final 10/12/2018  1:46 PM [facility] Lab   pO2, Arterial 91.0  80.0 - 100.0 mmHg Final 10/12/2018  1:46 PM [facility] Lab   HCO3, Arterial 20.6   22.0 - 26.0 mmol/L Final 10/12/2018  1:46 PM [facility] Excess, Arterial -2.8   -2.0 - 2.0 mmol/L Final 10/12/2018  1:46 PM [address] [place] [address] Lab   Hemoglobin, Art, Extended 12.6   14.0 - 18.0 g/dL Final 10/12/2018  1:46 PM [address] [place] [address] Lab   O2 Sat, Arterial 96.2  >92 % Final 10/12/2018  1:46 PM [facility] Lab   Carboxyhgb, Arterial 1.1  0.0 - 5.0 % Final 10/12/2018  1:46 PM [facility] Lab        0.0-1.5   (Smokers 1.5-5.0)    Methemoglobin, Arterial 0.9  <1.5 % Final 10/12/2018  1:46 PM [address] [place] [address] Lab   O2 Content, Arterial 17.1          Pt on room air, rr, AT+

## 2018-10-12 NOTE — ED PROVIDER NOTES
[address]  eMERGENCY dEPARTMENT eNCOUnter      Pt Name: Ashly Fuentes  MRN: [id number]  Christigfjanay 1955  Date of evaluation: 10/12/2018  Provider: Libra [address]       Chief Complaint   Patient presents with    Dizziness         HISTORY OF PRESENT ILLNESS   (Location/Symptom, Timing/Onset,Context/Setting, Quality, Duration, Modifying Factors, Severity)  Note limiting factors. Ashly Fuentes is a 61 y.o. male who presents to the emergency department with dizziness that feels like he will pass out. Started early this am.  Couldn't sleep and got up at 3am.  No chest pain. + shortness of breath. Worse with activity. Not vertigo. No recent infection or vomiting or diarrhea. No leg swelling. NO coughing. Has a nicotine patch and has smoke 4 cigs in the last few days. Trying to quit. Also furnace turned on for the first time last night and he sprayed for fleas 2 days ago. Patient has a pacemaker. He did have a heart cath February 2018 and had 2 stents at that time. The history is provided by the patient. Dizziness   Quality:  Lightheadedness and imbalance  Severity:  Moderate  Onset quality:  Sudden  Duration:  8 hours  Timing:  Constant  Progression:  Unchanged  Chronicity:  New  Context: not with head movement and not with loss of consciousness    Relieved by:  Nothing  Worsened by:  Nothing  Associated symptoms: shortness of breath    Associated symptoms: no blood in stool, no chest pain, no headaches, no palpitations, no syncope, no vision changes, no vomiting and no weakness    Risk factors: no new medications        NursingNotes were reviewed. REVIEW OF SYSTEMS    (2-9 systems for level 4, 10 or more for level 5)     Review of Systems   Respiratory: Positive for shortness of breath. Cardiovascular: Negative for chest pain, palpitations and syncope. Gastrointestinal: Negative for abdominal pain, blood in stool and vomiting.    Neurological: Positive for

## 2018-10-13 LAB
EKG P AXIS: NORMAL DEGREES
EKG P AXIS: NORMAL DEGREES
EKG P-R INTERVAL: NORMAL MS
EKG P-R INTERVAL: NORMAL MS
EKG Q-T INTERVAL: 410 MS
EKG Q-T INTERVAL: 416 MS
EKG QRS DURATION: 88 MS
EKG QRS DURATION: 92 MS
EKG QTC CALCULATION (BAZETT): 410 MS
EKG QTC CALCULATION (BAZETT): 416 MS
EKG T AXIS: 60 DEGREES
EKG T AXIS: 63 DEGREES

## 2018-11-10 DIAGNOSIS — I10 ESSENTIAL HYPERTENSION: ICD-10-CM

## 2018-11-12 RX ORDER — ATENOLOL 25 MG/1
TABLET ORAL
Qty: 30 TABLET | Refills: 5 | Status: SHIPPED | OUTPATIENT
Start: 2018-11-12 | End: 2020-11-17

## 2018-12-03 ENCOUNTER — HOSPITAL ENCOUNTER (EMERGENCY)
Age: 63
Discharge: HOME | End: 2018-12-03
Attending: EMERGENCY MEDICINE
Payer: MEDICARE

## 2018-12-03 ENCOUNTER — APPOINTMENT (OUTPATIENT)
Dept: GENERAL RADIOLOGY | Age: 63
End: 2018-12-03
Payer: MEDICARE

## 2018-12-03 VITALS
SYSTOLIC BLOOD PRESSURE: 138 MMHG | OXYGEN SATURATION: 99 % | BODY MASS INDEX: 23.54 KG/M2 | TEMPERATURE: 97.9 F | DIASTOLIC BLOOD PRESSURE: 77 MMHG | WEIGHT: 150 LBS | HEIGHT: 67 IN | HEART RATE: 68 BPM | RESPIRATION RATE: 18 BRPM

## 2018-12-03 DIAGNOSIS — J18.9 PNEUMONIA DUE TO ORGANISM: Primary | ICD-10-CM

## 2018-12-03 PROCEDURE — 71046 X-RAY EXAM CHEST 2 VIEWS: CPT

## 2018-12-03 PROCEDURE — 99283 EMERGENCY DEPT VISIT LOW MDM: CPT | Performed by: EMERGENCY MEDICINE

## 2018-12-03 PROCEDURE — 99283 EMERGENCY DEPT VISIT LOW MDM: CPT

## 2018-12-03 RX ORDER — LEVOFLOXACIN 750 MG/1
750 TABLET ORAL DAILY
Qty: 7 TABLET | Refills: 0 | Status: SHIPPED | OUTPATIENT
Start: 2018-12-03 | End: 2018-12-10

## 2018-12-03 ASSESSMENT — ENCOUNTER SYMPTOMS
COUGH: 1
BACK PAIN: 0
NAUSEA: 0
DIARRHEA: 0
SHORTNESS OF BREATH: 1
RHINORRHEA: 1
ABDOMINAL PAIN: 0
SORE THROAT: 0
VOMITING: 0

## 2018-12-12 RX ORDER — LISINOPRIL 2.5 MG/1
2.5 TABLET ORAL 2 TIMES DAILY
Qty: 60 TABLET | Refills: 5 | Status: SHIPPED | OUTPATIENT
Start: 2018-12-12 | End: 2019-05-28 | Stop reason: SDUPTHER

## 2018-12-14 ENCOUNTER — TELEPHONE (OUTPATIENT)
Dept: CARDIOLOGY | Age: 63
End: 2018-12-14

## 2018-12-14 DIAGNOSIS — Z95.0 PACEMAKER: Primary | ICD-10-CM

## 2018-12-14 DIAGNOSIS — I49.5 SINOATRIAL NODE DYSFUNCTION (HCC): ICD-10-CM

## 2018-12-14 PROCEDURE — 93294 REM INTERROG EVL PM/LDLS PM: CPT | Performed by: CLINICAL NURSE SPECIALIST

## 2018-12-14 PROCEDURE — 93296 REM INTERROG EVL PM/IDS: CPT | Performed by: CLINICAL NURSE SPECIALIST

## 2018-12-27 DIAGNOSIS — I65.23 BILATERAL CAROTID ARTERY STENOSIS: ICD-10-CM

## 2018-12-27 RX ORDER — ATORVASTATIN CALCIUM 40 MG/1
TABLET, FILM COATED ORAL
Qty: 30 TABLET | Refills: 5 | Status: SHIPPED | OUTPATIENT
Start: 2018-12-27 | End: 2020-11-17 | Stop reason: SDUPTHER

## 2019-03-14 DIAGNOSIS — Z95.0 PACEMAKER: Primary | ICD-10-CM

## 2019-03-14 DIAGNOSIS — I49.5 SINOATRIAL NODE DYSFUNCTION (HCC): ICD-10-CM

## 2019-03-18 ENCOUNTER — HOSPITAL ENCOUNTER (EMERGENCY)
Age: 64
Discharge: HOME | End: 2019-03-18
Payer: MEDICARE

## 2019-03-18 VITALS
HEART RATE: 72 BPM | RESPIRATION RATE: 18 BRPM | DIASTOLIC BLOOD PRESSURE: 83 MMHG | OXYGEN SATURATION: 96 % | HEIGHT: 64 IN | BODY MASS INDEX: 28.17 KG/M2 | TEMPERATURE: 97.7 F | SYSTOLIC BLOOD PRESSURE: 146 MMHG | WEIGHT: 165 LBS

## 2019-03-18 DIAGNOSIS — R21 RASH AND OTHER NONSPECIFIC SKIN ERUPTION: Primary | ICD-10-CM

## 2019-03-18 PROCEDURE — 99283 EMERGENCY DEPT VISIT LOW MDM: CPT | Performed by: NURSE PRACTITIONER

## 2019-03-18 PROCEDURE — 96372 THER/PROPH/DIAG INJ SC/IM: CPT

## 2019-03-18 PROCEDURE — 99282 EMERGENCY DEPT VISIT SF MDM: CPT

## 2019-03-18 PROCEDURE — 6360000002 HC RX W HCPCS: Performed by: NURSE PRACTITIONER

## 2019-03-18 RX ORDER — METHYLPREDNISOLONE 4 MG/1
TABLET ORAL
Qty: 1 KIT | Refills: 0 | Status: SHIPPED | OUTPATIENT
Start: 2019-03-18 | End: 2019-04-15 | Stop reason: ALTCHOICE

## 2019-03-18 RX ORDER — DEXAMETHASONE SODIUM PHOSPHATE 10 MG/ML
10 INJECTION, SOLUTION INTRAMUSCULAR; INTRAVENOUS ONCE
Status: COMPLETED | OUTPATIENT
Start: 2019-03-18 | End: 2019-03-18

## 2019-03-18 RX ADMIN — DEXAMETHASONE SODIUM PHOSPHATE 10 MG: 10 INJECTION INTRAMUSCULAR; INTRAVENOUS at 21:45

## 2019-03-18 ASSESSMENT — PAIN SCALES - GENERAL: PAINLEVEL_OUTOF10: 2

## 2019-03-25 ENCOUNTER — OFFICE VISIT (OUTPATIENT)
Dept: GASTROENTEROLOGY | Facility: CLINIC | Age: 64
End: 2019-03-25

## 2019-03-25 VITALS
HEART RATE: 82 BPM | DIASTOLIC BLOOD PRESSURE: 80 MMHG | OXYGEN SATURATION: 98 % | HEIGHT: 66 IN | WEIGHT: 159 LBS | SYSTOLIC BLOOD PRESSURE: 140 MMHG | BODY MASS INDEX: 25.55 KG/M2

## 2019-03-25 DIAGNOSIS — Z71.6 TOBACCO ABUSE COUNSELING: ICD-10-CM

## 2019-03-25 DIAGNOSIS — I10 HTN (HYPERTENSION), BENIGN: ICD-10-CM

## 2019-03-25 DIAGNOSIS — Z12.11 ENCOUNTER FOR SCREENING FOR MALIGNANT NEOPLASM OF COLON: Primary | ICD-10-CM

## 2019-03-25 PROCEDURE — S0260 H&P FOR SURGERY: HCPCS | Performed by: CLINICAL NURSE SPECIALIST

## 2019-03-25 RX ORDER — SODIUM, POTASSIUM,MAG SULFATES 17.5-3.13G
SOLUTION, RECONSTITUTED, ORAL ORAL
Qty: 2 BOTTLE | Refills: 0 | Status: SHIPPED | OUTPATIENT
Start: 2019-03-25 | End: 2021-09-15

## 2019-03-25 RX ORDER — ATORVASTATIN CALCIUM 40 MG/1
40 TABLET, FILM COATED ORAL
Refills: 5 | COMMUNITY
Start: 2019-02-25

## 2019-03-25 NOTE — PROGRESS NOTES
Azael Holguin  1955      3/25/2019  Chief Complaint   Patient presents with   • Colonoscopy     Subjective   HPI  Azael Holguin is a 64 y.o. male who presents as a referral for preventative maintenance. He has no complaints of nausea or vomiting. No change in bowels. No wt loss. No BRBPR. No melena. There is NO family hx for colon cancer. No abdominal pain. Poor prep in the past. Discussed today.   Past Medical History:   Diagnosis Date   • Arthritis    • Brain stem stroke syndrome    • CAD (coronary artery disease)    • COPD (chronic obstructive pulmonary disease) (CMS/HCC)    • Hyperlipidemia    • Hypertension    • Psoriasis      Past Surgical History:   Procedure Laterality Date   • A-V CARDIAC PACEMAKER INSERTION     • COLONOSCOPY  02/07/2011    Non-bleeding hemorrhoids repeat exam in 3 months due to poor bowel prep     Outpatient Medications Marked as Taking for the 3/25/19 encounter (Office Visit) with Ondina Jiménez APRN   Medication Sig Dispense Refill   • aspirin 81 MG chewable tablet Chew 1 tablet (81 mg) by oral route once daily     • atenolol (TENORMIN) 25 MG tablet Take 25 mg by mouth daily.     • atorvastatin (LIPITOR) 40 MG tablet Take 40 mg by mouth every night at bedtime.  5   • fluticasone (FLONASE) 50 MCG/ACT nasal spray Inhale 2 sprays (100 mcg) in each nostril by intranasal route once daily     • guaiFENesin (MUCINEX) 600 MG 12 hr tablet Extended Release  Take 1-2 tablets by oral route every 12 hours as needed for 30 days     • mometasone (ELOCON) 0.1 % cream Apply a few drops to the affected are(s) by topical route once daily for 30 days     • ticagrelor (BRILINTA) 90 MG tablet tablet Take 90 mg by mouth Daily.       Allergies   Allergen Reactions   • Codeine Sulfate      Social History     Socioeconomic History   • Marital status: Single     Spouse name: Not on file   • Number of children: Not on file   • Years of education: Not on file   • Highest education level: Not on file  "  Tobacco Use   • Smoking status: Current Every Day Smoker     Packs/day: 1.00     Types: Cigarettes   • Smokeless tobacco: Never Used   Substance and Sexual Activity   • Alcohol use: Yes     Comment: Light   • Drug use: Defer     Family History   Problem Relation Age of Onset   • Diabetes Father    • Hypertension Father    • Heart disease Father    • Colon cancer Neg Hx    • Colon polyps Neg Hx      Health Maintenance   Topic Date Due   • PNEUMOCOCCAL VACCINE (19-64 MEDIUM RISK) (1 of 1 - PPSV23) 02/24/1974   • ZOSTER VACCINE (1 of 2) 02/24/2005   • INFLUENZA VACCINE  08/01/2018   • HEPATITIS C SCREENING  03/05/2019   • MEDICARE ANNUAL WELLNESS  03/05/2019   • LIPID PANEL  03/25/2019   • COLONOSCOPY  02/07/2021   • TDAP/TD VACCINES (2 - Td) 01/12/2028       REVIEW OF SYSTEMS  General: well appearing, no fever chills or sweats, no unexplained wt loss  HEENT: no acute visual or hearing disturbances  Cardiovascular: No chest pain or palpitations  Pulmonary: No shortness of breath, coughing, wheezing or hemoptysis  : No burning, urgency, hematuria, or dysuria  Musculoskeletal: No joint pain or stiffness  Peripheral: no edema  Skin: No lesions or rashes  Neuro: No dizziness, headaches, stroke, syncope  Endocrine: No hot or cold intolerances  Hematological: No blood dyscrasias    Objective   Vitals:    03/25/19 1344   BP: 140/80   Pulse: 82   SpO2: 98%   Weight: 72.1 kg (159 lb)   Height: 167.6 cm (66\")     Body mass index is 25.66 kg/m².  Patient's Body mass index is 25.66 kg/m². BMI is within normal parameters. No follow-up required..      PHYSICAL EXAM  General: age appropriate well nourished well appearing, no acute distress  Head: normocephalic and atraumatic  Global assessment-supple  Neck-No JVD noted, no lymphadenopathy  Pulmonary-clear to auscultation bilaterally, normal respiratory effort  Cardiovascular-normal rate and rhythm, normal heart sounds, S1 and S2 noted  Abdomen-soft, non tender, non distended, " normal bowel sounds all 4 quadrants, no hepatosplenomegaly noted  Extremities-No clubbing cyanosis or edema  Neuro-Non focal, converses appropriately, awake, alert, oriented    Assessment/Plan     Azael was seen today for colonoscopy.    Diagnoses and all orders for this visit:    Encounter for screening for malignant neoplasm of colon  -     Case Request; Standing  -     Follow Anesthesia Guidelines / Standing Orders; Future  -     Implement Anesthesia Orders Day of Procedure; Standing  -     Obtain Informed Consent; Standing  -     Verify bowel prep was successful; Standing  -     Case Request  -     SUPREP BOWEL PREP KIT 17.5-3.13-1.6 GM/177ML solution oral solution; Take as directed by office instructions provided  -     magnesium citrate solution; Take 296 mL by mouth 1 (One) Time for 1 dose.    Tobacco abuse counseling    HTN (hypertension), benign  Comments:  cont BP medication the day of procedure    Brilinta to hold per Dr Hyatt's office he takes due to a hx of CAD with stent placement    COLONOSCOPY WITH ANESTHESIA (N/A)  Body mass index is 25.66 kg/m².    Patient instructions on prep prior to procedure provided to the patient.    All risks, benefits, alternatives, and indications of colonoscopy procedure have been discussed with the patient. Risks to include perforation of the colon requiring possible surgery or colostomy, risk of bleeding from biopsies or removal of colon tissue, possibility of missing a colon polyp or cancer, or adverse drug reaction.  Benefits to include the diagnosis and management of disease of the colon and rectum. Alternatives to include barium enema, radiographic evaluation, lab testing or no intervention. Pt verbalizes understanding and agrees.     Ondina Jiménez, APRN  3/25/2019  1:59 PM      IF YOU SMOKE OR USE TOBACCO PLEASE READ THE FOLLOWIN minutes reading provided    Why is smoking bad for me?  Smoking increases the risk of heart disease, lung disease, vascular  disease, stroke, and cancer.     If you smoke, STOP!    If you would like more information on quitting smoking, please visit the [employer] website: www.TIME PLUS Q/Litographsate/healthier-together/smoke   This link will provide additional resources including the QUIT line and the Beat the Pack support groups.     For more information:    Quit Now [place]  1-800-QUIT-NOW  https://[facility].quitlogix.org/en-US/    Obesity, Adult  Obesity is the condition of having too much total body fat. Being overweight or obese means that your weight is greater than what is considered healthy for your body size. Obesity is determined by a measurement called BMI. BMI is an estimate of body fat and is calculated from height and weight. For adults, a BMI of 30 or higher is considered obese.  Obesity can eventually lead to other health concerns and major illnesses, including:  · Stroke.  · Coronary artery disease (CAD).  · Type 2 diabetes.  · Some types of cancer, including cancers of the colon, breast, uterus, and gallbladder.  · Osteoarthritis.  · High blood pressure (hypertension).  · High cholesterol.  · Sleep apnea.  · Gallbladder stones.  · Infertility problems.  What are the causes?  The main cause of obesity is taking in (consuming) more calories than your body uses for energy. Other factors that contribute to this condition may include:  · Being born with genes that make you more likely to become obese.  · Having a medical condition that causes obesity. These conditions include:  ¨ Hypothyroidism.  ¨ Polycystic ovarian syndrome (PCOS).  ¨ Binge-eating disorder.  ¨ Cushing syndrome.  · Taking certain medicines, such as steroids, antidepressants, and seizure medicines.  · Not being physically active (sedentary lifestyle).  · Living where there are limited places to exercise safely or buy healthy foods.  · Not getting enough sleep.  What increases the risk?  The following factors may increase your risk of this  condition:  · Having a family history of obesity.  · Being a woman of -American descent.  · Being a man of  descent.  What are the signs or symptoms?  Having excessive body fat is the main symptom of this condition.  How is this diagnosed?  This condition may be diagnosed based on:  · Your symptoms.  · Your medical history.  · A physical exam. Your health care provider may measure:  ¨ Your BMI. If you are an adult with a BMI between 25 and less than 30, you are considered overweight. If you are an adult with a BMI of 30 or higher, you are considered obese.  ¨ The distances around your hips and your waist (circumferences). These may be compared to each other to help diagnose your condition.  ¨ Your skinfold thickness. Your health care provider may gently pinch a fold of your skin and measure it.  How is this treated?  Treatment for this condition often includes changing your lifestyle. Treatment may include some or all of the following:  · Dietary changes. Work with your health care provider and a dietitian to set a weight-loss goal that is healthy and reasonable for you. Dietary changes may include eating:  ¨ Smaller portions. A portion size is the amount of a particular food that is healthy for you to eat at one time. This varies from person to person.  ¨ Low-calorie or low-fat options.  ¨ More whole grains, fruits, and vegetables.  · Regular physical activity. This may include aerobic activity (cardio) and strength training.  · Medicine to help you lose weight. Your health care provider may prescribe medicine if you are unable to lose 1 pound a week after 6 weeks of eating more healthily and doing more physical activity.  · Surgery. Surgical options may include gastric banding and gastric bypass. Surgery may be done if:  ¨ Other treatments have not helped to improve your condition.  ¨ You have a BMI of 40 or higher.  ¨ You have life-threatening health problems related to obesity.  Follow these  instructions at home:     Eating and drinking     · Follow recommendations from your health care provider about what you eat and drink. Your health care provider may advise you to:  ¨ Limit fast foods, sweets, and processed snack foods.  ¨ Choose low-fat options, such as low-fat milk instead of whole milk.  ¨ Eat 5 or more servings of fruits or vegetables every day.  ¨ Eat at home more often. This gives you more control over what you eat.  ¨ Choose healthy foods when you eat out.  ¨ Learn what a healthy portion size is.  ¨ Keep low-fat snacks on hand.  ¨ Avoid sugary drinks, such as soda, fruit juice, iced tea sweetened with sugar, and flavored milk.  ¨ Eat a healthy breakfast.  · Drink enough water to keep your urine clear or pale yellow.  · Do not go without eating for long periods of time (do not fast) or follow a fad diet. Fasting and fad diets can be unhealthy and even dangerous.  Physical Activity   · Exercise regularly, as told by your health care provider. Ask your health care provider what types of exercise are safe for you and how often you should exercise.  · Warm up and stretch before being active.  · Cool down and stretch after being active.  · Rest between periods of activity.  Lifestyle   · Limit the time that you spend in front of your TV, computer, or video game system.  · Find ways to reward yourself that do not involve food.  · Limit alcohol intake to no more than 1 drink a day for nonpregnant women and 2 drinks a day for men. One drink equals 12 oz of beer, 5 oz of wine, or 1½ oz of hard liquor.  General instructions   · Keep a weight loss journal to keep track of the food you eat and how much you exercise you get.  · Take over-the-counter and prescription medicines only as told by your health care provider.  · Take vitamins and supplements only as told by your health care provider.  · Consider joining a support group. Your health care provider may be able to recommend a support group.  · Keep  all follow-up visits as told by your health care provider. This is important.  Contact a health care provider if:  · You are unable to meet your weight loss goal after 6 weeks of dietary and lifestyle changes.  This information is not intended to replace advice given to you by your health care provider. Make sure you discuss any questions you have with your health care provider.  Document Released: 01/25/2006 Document Revised: 05/22/2017 Document Reviewed: 10/05/2016  [employer] Interactive Patient Education © 2017 [employer]

## 2019-04-15 ENCOUNTER — OFFICE VISIT (OUTPATIENT)
Dept: CARDIOLOGY | Age: 64
End: 2019-04-15
Payer: MEDICARE

## 2019-04-15 VITALS
DIASTOLIC BLOOD PRESSURE: 68 MMHG | SYSTOLIC BLOOD PRESSURE: 122 MMHG | WEIGHT: 157 LBS | HEIGHT: 66 IN | BODY MASS INDEX: 25.23 KG/M2

## 2019-04-15 DIAGNOSIS — I49.5 SINOATRIAL NODE DYSFUNCTION (HCC): ICD-10-CM

## 2019-04-15 DIAGNOSIS — Z95.0 PACEMAKER: ICD-10-CM

## 2019-04-15 DIAGNOSIS — I25.10 CORONARY ARTERY DISEASE INVOLVING NATIVE CORONARY ARTERY OF NATIVE HEART WITHOUT ANGINA PECTORIS: Primary | ICD-10-CM

## 2019-04-15 DIAGNOSIS — Z95.5 H/O HEART ARTERY STENT: ICD-10-CM

## 2019-04-15 DIAGNOSIS — E78.2 MIXED HYPERLIPIDEMIA: ICD-10-CM

## 2019-04-15 PROCEDURE — 1036F TOBACCO NON-USER: CPT | Performed by: INTERNAL MEDICINE

## 2019-04-15 PROCEDURE — G8427 DOCREV CUR MEDS BY ELIG CLIN: HCPCS | Performed by: INTERNAL MEDICINE

## 2019-04-15 PROCEDURE — G8419 CALC BMI OUT NRM PARAM NOF/U: HCPCS | Performed by: INTERNAL MEDICINE

## 2019-04-15 PROCEDURE — 99213 OFFICE O/P EST LOW 20 MIN: CPT | Performed by: INTERNAL MEDICINE

## 2019-04-15 PROCEDURE — 3017F COLORECTAL CA SCREEN DOC REV: CPT | Performed by: INTERNAL MEDICINE

## 2019-04-15 PROCEDURE — G8599 NO ASA/ANTIPLAT THER USE RNG: HCPCS | Performed by: INTERNAL MEDICINE

## 2019-04-15 PROCEDURE — 93280 PM DEVICE PROGR EVAL DUAL: CPT | Performed by: INTERNAL MEDICINE

## 2019-04-15 ASSESSMENT — ENCOUNTER SYMPTOMS
NAUSEA: 0
GASTROINTESTINAL NEGATIVE: 1
DIARRHEA: 0
SHORTNESS OF BREATH: 0
EYES NEGATIVE: 1
VOMITING: 0
RESPIRATORY NEGATIVE: 1

## 2019-04-15 NOTE — PROGRESS NOTES
Pacemaker interrogated  Presenting rhythm:  APVS, AP 43.5%,  <0.1%  Battey voltage 2.97 V  Lead status:  Lead impedance within range and stable  Sensing:  P waves 1.8 mV,  R waves 8.3 mV  Thresholds:  Atrial 0.5V @ 0.4ms, ventricular 1@ 0.4ms  Observations:  No fast rates since last Carelink. Reprogramming for sensitivity and threshold testing  Next carelink appointment:  7/15/19.

## 2019-04-15 NOTE — PROGRESS NOTES
[facility]                            Date:  4/15/2019  Patient: Rob Cadet  Age:  59 y.o., 1955      Reason for evaluation:         SUBJECTIVE:    Returns follow-up for ischemic heart disease had catheterization forever 2018 stent proximal LAD and diagonal. Presently doing reasonably well denies limiting dyspnea denies chest pain. Still smoking 2 packs per week. No other complaints. Review of Systems   Constitutional: Negative. Negative for chills, fever and unexpected weight change. HENT: Negative. Eyes: Negative. Respiratory: Negative. Negative for shortness of breath. Cardiovascular: Negative. Negative for chest pain. Gastrointestinal: Negative. Negative for diarrhea, nausea and vomiting. Endocrine: Negative. Genitourinary: Negative. Musculoskeletal: Negative. Skin: Negative. Neurological: Negative. All other systems reviewed and are negative. OBJECTIVE:     /68   Ht 5' 6\" (1.676 m)   Wt 157 lb (71.2 kg)   BMI 25.34 kg/m²     Labs:   CBC: No results for input(s): WBC, HGB, HCT, PLT in the last 72 hours. BMP:No results for input(s): NA, K, CO2, BUN, CREATININE, LABGLOM, GLUCOSE in the last 72 hours. BNP: No results for input(s): BNP in the last 72 hours. PT/INR: No results for input(s): PROTIME, INR in the last 72 hours. APTT:No results for input(s): APTT in the last 72 hours. CARDIAC ENZYMES:No results for input(s): CKTOTAL, CKMB, CKMBINDEX, TROPONINI in the last 72 hours. FASTING LIPID PANEL:  Lab Results   Component Value Date    HDL 22 02/27/2018    LDLDIRECT 105 02/23/2013    LDLCALC 40 02/27/2018    TRIG 119 02/27/2018     LIVER PROFILE:No results for input(s): AST, ALT, LABALBU in the last 72 hours.         Past Medical History:   Diagnosis Date    Bradycardia     3/5/13  loop recorder    Carotid artery stenosis     Cigarette nicotine dependence in remission 2/21/2018    Hypoglycemia     Mixed hyperlipidemia Packs/day: 1.50     Years: 50.00     Pack years: 75.00     Types: Cigarettes    Smokeless tobacco: Former User     Types: Chew    Tobacco comment:  quit 6 weeks ago   Substance and Sexual Activity    Alcohol use: No     Alcohol/week: 0.0 oz     Comment: 1-2 beers a daily Weekend Drinker at times   [employer] Drug use: No     Comment: Past Use    Sexual activity: Never     Partners: Female   Lifestyle    Physical activity:     Days per week: Not on file     Minutes per session: Not on file    Stress: Not on file   Relationships    Social connections:     Talks on phone: Not on file     Gets together: Not on file     Attends Sabianist service: Not on file     Active member of club or organization: Not on file     Attends meetings of clubs or organizations: Not on file     Relationship status: Not on file    Intimate partner violence:     Fear of current or ex partner: Not on file     Emotionally abused: Not on file     Physically abused: Not on file     Forced sexual activity: Not on file   Other Topics Concern    Not on file   Social History Narrative    Not on file       Physical Examination:  /68   Ht 5' 6\" (1.676 m)   Wt 157 lb (71.2 kg)   BMI 25.34 kg/m²   Physical Exam   Constitutional: He appears well-developed and well-nourished. Neck: No JVD present. Carotid bruit is not present. Cardiovascular: Normal rate, regular rhythm and normal heart sounds. Exam reveals no gallop and no friction rub. No murmur heard. Pulmonary/Chest: Effort normal and breath sounds normal. No respiratory distress. He has no wheezes. He has no rales. Abdominal: He exhibits no distension. There is no tenderness. Musculoskeletal: He exhibits no edema. Lymphadenopathy:     He has no cervical adenopathy. Skin: Skin is warm and dry. Vitals reviewed. ASSESSMENT:     Diagnosis Orders   1. Coronary artery disease involving native coronary artery of native heart without angina pectoris     2.  Pacemaker 3. Sinoatrial node dysfunction ([address]     4. Mixed hyperlipidemia     5. H/O heart artery stent         PLAN:  No orders of the defined types were placed in this encounter. No orders of the defined types were placed in this encounter. 1. Continue present medications  2. Recommend follow-up assessment in 6 months    Return in about 6 months (around 10/15/2019). Chelly Jeffries MD 4/15/2019 3:33 PM    [facility]      Thisdictation was generated by voice recognition computer software. Although all attempts are made to edit the dictation for accuracy, there may be errors in the transcription that are not intended.

## 2019-04-29 ENCOUNTER — TRANSCRIBE ORDERS (OUTPATIENT)
Dept: ADMINISTRATIVE | Facility: HOSPITAL | Age: 64
End: 2019-04-29

## 2019-04-29 ENCOUNTER — APPOINTMENT (OUTPATIENT)
Dept: LAB | Facility: HOSPITAL | Age: 64
End: 2019-04-29

## 2019-04-29 DIAGNOSIS — L40.0 PSORIASIS VULGARIS: ICD-10-CM

## 2019-04-29 DIAGNOSIS — Z51.81 ENCOUNTER FOR THERAPEUTIC DRUG MONITORING: Primary | ICD-10-CM

## 2019-04-29 DIAGNOSIS — Z11.59 SCREENING FOR VIRAL DISEASE: ICD-10-CM

## 2019-04-29 LAB
ALBUMIN SERPL-MCNC: 4.2 G/DL (ref 3.5–5)
ALBUMIN/GLOB SERPL: 1.2 G/DL (ref 1.1–2.5)
ALP SERPL-CCNC: 61 U/L (ref 24–120)
ALT SERPL W P-5'-P-CCNC: 264 U/L (ref 0–54)
ANION GAP SERPL CALCULATED.3IONS-SCNC: 10 MMOL/L (ref 4–13)
AST SERPL-CCNC: 367 U/L (ref 7–45)
BASOPHILS # BLD AUTO: 0.02 10*3/MM3 (ref 0–0.2)
BASOPHILS NFR BLD AUTO: 0.5 % (ref 0–2)
BILIRUB SERPL-MCNC: 0.7 MG/DL (ref 0.1–1)
BUN BLD-MCNC: 14 MG/DL (ref 5–21)
BUN/CREAT SERPL: 17.5 (ref 7–25)
CALCIUM SPEC-SCNC: 9.4 MG/DL (ref 8.4–10.4)
CHLORIDE SERPL-SCNC: 105 MMOL/L (ref 98–110)
CO2 SERPL-SCNC: 26 MMOL/L (ref 24–31)
CREAT BLD-MCNC: 0.8 MG/DL (ref 0.5–1.4)
DEPRECATED RDW RBC AUTO: 48.1 FL (ref 40–54)
EOSINOPHIL # BLD AUTO: 0.09 10*3/MM3 (ref 0–0.7)
EOSINOPHIL NFR BLD AUTO: 2.1 % (ref 0–4)
ERYTHROCYTE [DISTWIDTH] IN BLOOD BY AUTOMATED COUNT: 14.5 % (ref 12–15)
GFR SERPL CREATININE-BSD FRML MDRD: 97 ML/MIN/1.73
GLOBULIN UR ELPH-MCNC: 3.6 GM/DL
GLUCOSE BLD-MCNC: 76 MG/DL (ref 70–100)
HBV CORE IGM SERPL QL IA: NEGATIVE
HBV SURFACE AG SERPL QL IA: NEGATIVE
HCT VFR BLD AUTO: 39.9 % (ref 40–52)
HCV AB SER DONR QL: REACTIVE
HCV S/C RATIO: 26.7 (ref 0–0.99)
HGB BLD-MCNC: 13.5 G/DL (ref 14–18)
IMM GRANULOCYTES # BLD AUTO: 0.02 10*3/MM3 (ref 0–0.05)
IMM GRANULOCYTES NFR BLD AUTO: 0.5 % (ref 0–5)
LYMPHOCYTES # BLD AUTO: 0.98 10*3/MM3 (ref 0.72–4.86)
LYMPHOCYTES NFR BLD AUTO: 22.5 % (ref 15–45)
MCH RBC QN AUTO: 31.1 PG (ref 28–32)
MCHC RBC AUTO-ENTMCNC: 33.8 G/DL (ref 33–36)
MCV RBC AUTO: 91.9 FL (ref 82–95)
MONOCYTES # BLD AUTO: 0.47 10*3/MM3 (ref 0.19–1.3)
MONOCYTES NFR BLD AUTO: 10.8 % (ref 4–12)
NEUTROPHILS # BLD AUTO: 2.77 10*3/MM3 (ref 1.87–8.4)
NEUTROPHILS NFR BLD AUTO: 63.6 % (ref 39–78)
NRBC BLD AUTO-RTO: 0 /100 WBC (ref 0–0.2)
PLATELET # BLD AUTO: 175 10*3/MM3 (ref 130–400)
PMV BLD AUTO: 9.7 FL (ref 6–12)
POTASSIUM BLD-SCNC: 3.8 MMOL/L (ref 3.5–5.3)
PROT SERPL-MCNC: 7.8 G/DL (ref 6.3–8.7)
RBC # BLD AUTO: 4.34 10*6/MM3 (ref 4.8–5.9)
SODIUM BLD-SCNC: 141 MMOL/L (ref 135–145)
WBC NRBC COR # BLD: 4.35 10*3/MM3 (ref 4.8–10.8)

## 2019-04-29 PROCEDURE — 36415 COLL VENOUS BLD VENIPUNCTURE: CPT | Performed by: PHYSICIAN ASSISTANT

## 2019-04-29 PROCEDURE — 80053 COMPREHEN METABOLIC PANEL: CPT | Performed by: PHYSICIAN ASSISTANT

## 2019-04-29 PROCEDURE — 80074 ACUTE HEPATITIS PANEL: CPT | Performed by: PHYSICIAN ASSISTANT

## 2019-04-29 PROCEDURE — 85025 COMPLETE CBC W/AUTO DIFF WBC: CPT | Performed by: PHYSICIAN ASSISTANT

## 2019-05-07 ENCOUNTER — TELEPHONE (OUTPATIENT)
Dept: CARDIOLOGY | Age: 64
End: 2019-05-07

## 2019-05-28 RX ORDER — LISINOPRIL 2.5 MG/1
2.5 TABLET ORAL 2 TIMES DAILY
Qty: 60 TABLET | Refills: 5 | Status: SHIPPED | OUTPATIENT
Start: 2019-05-28 | End: 2019-12-06 | Stop reason: SDUPTHER

## 2019-06-17 ENCOUNTER — ANESTHESIA (OUTPATIENT)
Dept: GASTROENTEROLOGY | Facility: HOSPITAL | Age: 64
End: 2019-06-17

## 2019-06-17 ENCOUNTER — HOSPITAL ENCOUNTER (OUTPATIENT)
Facility: HOSPITAL | Age: 64
Setting detail: HOSPITAL OUTPATIENT SURGERY
Discharge: HOME | End: 2019-06-17
Attending: INTERNAL MEDICINE | Admitting: INTERNAL MEDICINE

## 2019-06-17 ENCOUNTER — ANESTHESIA EVENT (OUTPATIENT)
Dept: GASTROENTEROLOGY | Facility: HOSPITAL | Age: 64
End: 2019-06-17

## 2019-06-17 VITALS
HEART RATE: 62 BPM | TEMPERATURE: 97.2 F | BODY MASS INDEX: 24.75 KG/M2 | SYSTOLIC BLOOD PRESSURE: 134 MMHG | WEIGHT: 145 LBS | RESPIRATION RATE: 14 BRPM | DIASTOLIC BLOOD PRESSURE: 78 MMHG | OXYGEN SATURATION: 98 % | HEIGHT: 64 IN

## 2019-06-17 DIAGNOSIS — Z12.11 ENCOUNTER FOR SCREENING FOR MALIGNANT NEOPLASM OF COLON: ICD-10-CM

## 2019-06-17 PROCEDURE — 25010000002 PROPOFOL 10 MG/ML EMULSION: Performed by: NURSE ANESTHETIST, CERTIFIED REGISTERED

## 2019-06-17 PROCEDURE — 45378 DIAGNOSTIC COLONOSCOPY: CPT | Performed by: INTERNAL MEDICINE

## 2019-06-17 RX ORDER — PROPOFOL 10 MG/ML
VIAL (ML) INTRAVENOUS AS NEEDED
Status: DISCONTINUED | OUTPATIENT
Start: 2019-06-17 | End: 2019-06-17 | Stop reason: SURG

## 2019-06-17 RX ORDER — SODIUM CHLORIDE 9 MG/ML
500 INJECTION, SOLUTION INTRAVENOUS CONTINUOUS PRN
Status: DISCONTINUED | OUTPATIENT
Start: 2019-06-17 | End: 2019-06-17 | Stop reason: HOSPADM

## 2019-06-17 RX ORDER — SODIUM CHLORIDE 0.9 % (FLUSH) 0.9 %
3 SYRINGE (ML) INJECTION AS NEEDED
Status: DISCONTINUED | OUTPATIENT
Start: 2019-06-17 | End: 2019-06-17 | Stop reason: HOSPADM

## 2019-06-17 RX ADMIN — PROPOFOL 50 MG: 10 INJECTION, EMULSION INTRAVENOUS at 10:42

## 2019-06-17 RX ADMIN — SODIUM CHLORIDE 500 ML: 9 INJECTION, SOLUTION INTRAVENOUS at 08:50

## 2019-06-17 RX ADMIN — SODIUM CHLORIDE: 9 INJECTION, SOLUTION INTRAVENOUS at 10:36

## 2019-06-17 NOTE — ANESTHESIA PREPROCEDURE EVALUATION
Anesthesia Evaluation     Patient summary reviewed   no history of anesthetic complications:  NPO Solid Status: > 8 hours             Airway   Mallampati: II  TM distance: >3 FB  Neck ROM: full  Dental    (+) lower dentures and upper dentures    Pulmonary    (+) a smoker, COPD,   Cardiovascular   Exercise tolerance: excellent (>7 METS)    (+) pacemaker (medtronic) pacemaker interrogated 3-6 months ago, hypertension, CAD, cardiac stents more than 12 months ago hyperlipidemia,   (-) angina      Neuro/Psych  (+) CVA,     GI/Hepatic/Renal/Endo    (+)   hepatitis C,     Musculoskeletal     Abdominal    Substance History      OB/GYN          Other                        Anesthesia Plan    ASA 3     MAC     Anesthetic plan, all risks, benefits, and alternatives have been provided, discussed and informed consent has been obtained with: patient.

## 2019-06-17 NOTE — H&P
[facility]  Pre Procedure History & Physical    Chief Complaint:   Blood per rectum    Subjective     HPI:   Here for colonoscopy.  Rectal bleeding.  Last colonoscopy in 2011.  Poor prep at that time.  Did not follow-up.    Past Medical History:   Past Medical History:   Diagnosis Date   • Arthritis    • Brain stem stroke syndrome    • CAD (coronary artery disease)    • COPD (chronic obstructive pulmonary disease) (CMS/HCC)    • Hyperlipidemia    • Hypertension    • Psoriasis        Past Surgical History:  Past Surgical History:   Procedure Laterality Date   • A-V CARDIAC PACEMAKER INSERTION     • CARDIAC CATHETERIZATION      stents x2   • COLONOSCOPY  02/07/2011    Non-bleeding hemorrhoids repeat exam in 3 months due to poor bowel prep       Family History:  Family History   Problem Relation Age of Onset   • Diabetes Father    • Hypertension Father    • Heart disease Father    • Colon cancer Neg Hx    • Colon polyps Neg Hx        Social History:   reports that he has been smoking cigarettes.  He has a 50.00 pack-year smoking history. He has never used smokeless tobacco. He reports that he does not drink alcohol or use drugs.    Medications:   Prior to Admission medications    Medication Sig Start Date End Date Taking? Authorizing Provider   SUPREP BOWEL PREP KIT 17.5-3.13-1.6 GM/177ML solution oral solution Take as directed by office instructions provided 3/25/19  Yes Ondina Jiménez APRN   aspirin 81 MG chewable tablet Chew 1 tablet (81 mg) by oral route once daily    Gray Duran MD   atenolol (TENORMIN) 25 MG tablet Take 25 mg by mouth daily. 7/6/16   Gray Duran MD   atorvastatin (LIPITOR) 40 MG tablet Take 40 mg by mouth every night at bedtime. 2/25/19   Gray Duran MD   fluticasone (FLONASE) 50 MCG/ACT nasal spray Inhale 2 sprays (100 mcg) in each nostril by intranasal route once daily 7/18/16   Gray Duran MD   guaiFENesin (MUCINEX) 600 MG 12  "hr tablet Extended Release  Take 1-2 tablets by oral route every 12 hours as needed for 30 days 7/18/16   ProviderGray MD   mometasone (ELOCON) 0.1 % cream Apply a few drops to the affected are(s) by topical route once daily for 30 days 7/18/16   Gray Duran MD   ticagrelor (BRILINTA) 90 MG tablet tablet Take 90 mg by mouth Daily. 10/30/18   Gray Duran MD       Allergies:  Codeine sulfate    Objective     Blood pressure 135/68, pulse 80, temperature 97.2 °F (36.2 °C), temperature source Temporal, resp. rate 20, height 162.6 cm (64\"), weight 65.8 kg (145 lb), SpO2 97 %.    Physical Exam   Constitutional: Pt is oriented to person, place, and in no distress.   HENT: Mouth/Throat: Oropharynx is clear.   Cardiovascular: Normal rate, regular rhythm.    Pulmonary/Chest: Effort normal. No respiratory distress. No  wheezes.   Abdominal: Soft. Non-distended.  Skin: Skin is warm and dry.   Psychiatric: Mood, memory, affect and judgment appear normal.     Assessment/Plan     Diagnosis:  Rectal bleeding    Anticipated Surgical Procedure:    Proceed with colonoscopy    The following major R/B/A were discussed with the patient, however the list is not all inclusive . Risk:  Bleeding (immediate and delayed), perforation (rupture or tear), reaction to medication, missed lesion/cancer, pain during the procedure, infection, need for surgery, need for ostomy, need for mechanical ventilation (breathing machine), death.  Benefits: removal of polyp/tissue, burn/clip/or inject to stop bleeding, removal of foreign body, dilate any stricture.  Alternatives: Xray or CT, surgery, do nothing with associated risk   The patient was given time to ask question and received explanation, and agrees to proceed as per History and Physical.   No guarantee given or expressed.    EMR Dragon/transcription disclaimer: Much of this encounter note is an electronic transcription/translation of spoken language to printed text.  The " electronic translation of spoken language may permit erroneous, or at times, nonsensical words or phrases to be inadvertently transcribed.  Although I have reviewed the note for such errors, some may still exist.    Josue Buckner MD  10:43 AM  6/17/2019

## 2019-06-17 NOTE — ANESTHESIA POSTPROCEDURE EVALUATION
"Patient: Azael Holguin    Procedure Summary     Date:  06/17/19 Room / Location:  [facility] ENDOSCOPY 2 / [facility]    Anesthesia Start:  1038 Anesthesia Stop:  1059    Procedure:  COLONOSCOPY WITH ANESTHESIA (N/A ) Diagnosis:       Encounter for screening for malignant neoplasm of colon      (Encounter for screening for malignant neoplasm of colon [Z12.11])    Surgeon:  Josue Buckner MD Provider:  Pete Rey CRNA    Anesthesia Type:  MAC ASA Status:  3          Anesthesia Type: MAC  Last vitals  BP   120/68 (06/17/19 1110)   Temp   97.2 °F (36.2 °C) (06/17/19 0831)   Pulse   62 (06/17/19 1100)   Resp   14 (06/17/19 1115)     SpO2   98 % (06/17/19 1100)     Post Anesthesia Care and Evaluation    Patient location during evaluation: PACU  Patient participation: complete - patient participated  Level of consciousness: awake and alert  Pain management: adequate  Airway patency: patent  Anesthetic complications: No anesthetic complications    Cardiovascular status: acceptable  Respiratory status: acceptable  Hydration status: acceptable    Comments: Blood pressure 120/68, pulse 62, temperature 97.2 °F (36.2 °C), temperature source Temporal, resp. rate 14, height 162.6 cm (64\"), weight 65.8 kg (145 lb), SpO2 98 %.    Pt discharged from PACU based on jarad score >8      "

## 2019-07-02 ENCOUNTER — HOSPITAL ENCOUNTER (OUTPATIENT)
Dept: ULTRASOUND IMAGING | Age: 64
Discharge: HOME | End: 2019-07-02
Payer: MEDICARE

## 2019-07-02 DIAGNOSIS — R79.89 ABNORMAL LFTS: ICD-10-CM

## 2019-07-02 PROCEDURE — 76705 ECHO EXAM OF ABDOMEN: CPT

## 2019-07-15 DIAGNOSIS — Z95.0 PACEMAKER: ICD-10-CM

## 2019-07-15 DIAGNOSIS — I49.5 SINOATRIAL NODE DYSFUNCTION (HCC): Primary | ICD-10-CM

## 2019-07-15 PROCEDURE — 93296 REM INTERROG EVL PM/IDS: CPT | Performed by: NURSE PRACTITIONER

## 2019-07-15 PROCEDURE — 93294 REM INTERROG EVL PM/LDLS PM: CPT | Performed by: NURSE PRACTITIONER

## 2019-07-25 ENCOUNTER — TELEPHONE (OUTPATIENT)
Dept: CARDIOLOGY | Age: 64
End: 2019-07-25

## 2019-07-25 NOTE — TELEPHONE ENCOUNTER
Patient had labs drawn on 19 and they are in media. His atorvastatin was denied on 19 due to needing labs. He has not taken it since then. He has been dx with Hep C.     TC: 148  HDL: 29  LDL: 62  Tri  ALT: 146  AST: 209    Please advise if he needs to resume statin.

## 2019-10-24 ENCOUNTER — OFFICE VISIT (OUTPATIENT)
Dept: CARDIOLOGY | Age: 64
End: 2019-10-24
Payer: MEDICARE

## 2019-10-24 VITALS
HEART RATE: 72 BPM | HEIGHT: 67 IN | WEIGHT: 157 LBS | BODY MASS INDEX: 24.64 KG/M2 | DIASTOLIC BLOOD PRESSURE: 68 MMHG | SYSTOLIC BLOOD PRESSURE: 130 MMHG

## 2019-10-24 DIAGNOSIS — I65.23 BILATERAL CAROTID ARTERY STENOSIS: ICD-10-CM

## 2019-10-24 DIAGNOSIS — Z95.0 PACEMAKER: ICD-10-CM

## 2019-10-24 DIAGNOSIS — Z95.5 H/O HEART ARTERY STENT: ICD-10-CM

## 2019-10-24 DIAGNOSIS — I49.5 SINOATRIAL NODE DYSFUNCTION (HCC): Primary | ICD-10-CM

## 2019-10-24 DIAGNOSIS — E78.2 MIXED HYPERLIPIDEMIA: ICD-10-CM

## 2019-10-24 DIAGNOSIS — I20.8 OTHER FORMS OF ANGINA PECTORIS (HCC): ICD-10-CM

## 2019-10-24 DIAGNOSIS — I25.10 CORONARY ARTERY DISEASE INVOLVING NATIVE CORONARY ARTERY OF NATIVE HEART WITHOUT ANGINA PECTORIS: ICD-10-CM

## 2019-10-24 PROCEDURE — G8420 CALC BMI NORM PARAMETERS: HCPCS | Performed by: INTERNAL MEDICINE

## 2019-10-24 PROCEDURE — G8427 DOCREV CUR MEDS BY ELIG CLIN: HCPCS | Performed by: INTERNAL MEDICINE

## 2019-10-24 PROCEDURE — G8484 FLU IMMUNIZE NO ADMIN: HCPCS | Performed by: INTERNAL MEDICINE

## 2019-10-24 PROCEDURE — G8599 NO ASA/ANTIPLAT THER USE RNG: HCPCS | Performed by: INTERNAL MEDICINE

## 2019-10-24 PROCEDURE — 99214 OFFICE O/P EST MOD 30 MIN: CPT | Performed by: INTERNAL MEDICINE

## 2019-10-24 PROCEDURE — 3017F COLORECTAL CA SCREEN DOC REV: CPT | Performed by: INTERNAL MEDICINE

## 2019-10-24 PROCEDURE — 4004F PT TOBACCO SCREEN RCVD TLK: CPT | Performed by: INTERNAL MEDICINE

## 2019-10-24 RX ORDER — ATORVASTATIN CALCIUM 40 MG/1
40 TABLET, FILM COATED ORAL DAILY
Qty: 30 TABLET | Refills: 5 | Status: SHIPPED | OUTPATIENT
Start: 2019-10-24 | End: 2020-09-11

## 2019-10-24 RX ORDER — VENLAFAXINE 37.5 MG/1
37.5 TABLET ORAL DAILY
COMMUNITY
End: 2020-09-11

## 2019-10-24 ASSESSMENT — ENCOUNTER SYMPTOMS
EYES NEGATIVE: 1
VOMITING: 0
RESPIRATORY NEGATIVE: 1
DIARRHEA: 0
SHORTNESS OF BREATH: 0
GASTROINTESTINAL NEGATIVE: 1
NAUSEA: 0

## 2019-12-06 RX ORDER — LISINOPRIL 2.5 MG/1
2.5 TABLET ORAL 2 TIMES DAILY
Qty: 60 TABLET | Refills: 5 | Status: SHIPPED | OUTPATIENT
Start: 2019-12-06 | End: 2020-06-08

## 2019-12-20 ENCOUNTER — TRANSCRIBE ORDERS (OUTPATIENT)
Dept: ADMINISTRATIVE | Facility: HOSPITAL | Age: 64
End: 2019-12-20

## 2019-12-20 ENCOUNTER — HOSPITAL ENCOUNTER (OUTPATIENT)
Dept: ULTRASOUND IMAGING | Facility: HOSPITAL | Age: 64
Discharge: HOME | End: 2019-12-20
Admitting: INTERNAL MEDICINE

## 2019-12-20 DIAGNOSIS — E55.9 VITAMIN D DEFICIENCY: ICD-10-CM

## 2019-12-20 DIAGNOSIS — L40.9 PSORIASIS: ICD-10-CM

## 2019-12-20 DIAGNOSIS — B18.2 CHRONIC HEPATITIS C WITHOUT HEPATIC COMA (HCC): ICD-10-CM

## 2019-12-20 DIAGNOSIS — R94.5 ABNORMAL RESULTS OF LIVER FUNCTION STUDIES: Primary | ICD-10-CM

## 2019-12-20 DIAGNOSIS — R94.5 ABNORMAL RESULTS OF LIVER FUNCTION STUDIES: ICD-10-CM

## 2019-12-20 PROCEDURE — 76705 ECHO EXAM OF ABDOMEN: CPT

## 2019-12-27 ENCOUNTER — TRANSCRIBE ORDERS (OUTPATIENT)
Dept: ADMINISTRATIVE | Facility: HOSPITAL | Age: 64
End: 2019-12-27

## 2019-12-27 DIAGNOSIS — R93.2 ABNORMAL FINDINGS ON DIAGNOSTIC IMAGING OF LIVER AND BILIARY TRACT: Primary | ICD-10-CM

## 2019-12-31 ENCOUNTER — APPOINTMENT (OUTPATIENT)
Dept: MRI IMAGING | Facility: HOSPITAL | Age: 64
End: 2019-12-31

## 2020-01-08 ENCOUNTER — HOSPITAL ENCOUNTER (OUTPATIENT)
Dept: CT IMAGING | Age: 65
Discharge: HOME | End: 2020-01-08
Payer: MEDICARE

## 2020-01-08 PROCEDURE — 74170 CT ABD WO CNTRST FLWD CNTRST: CPT

## 2020-01-08 PROCEDURE — 6360000004 HC RX CONTRAST MEDICATION: Performed by: INTERNAL MEDICINE

## 2020-01-08 RX ADMIN — IOPAMIDOL 75 ML: 755 INJECTION, SOLUTION INTRAVENOUS at 14:32

## 2020-01-28 ENCOUNTER — TELEPHONE (OUTPATIENT)
Dept: CARDIOLOGY | Age: 65
End: 2020-01-28

## 2020-01-29 PROCEDURE — 93296 REM INTERROG EVL PM/IDS: CPT | Performed by: CLINICAL NURSE SPECIALIST

## 2020-01-29 PROCEDURE — 93294 REM INTERROG EVL PM/LDLS PM: CPT | Performed by: CLINICAL NURSE SPECIALIST

## 2020-04-27 ENCOUNTER — VIRTUAL VISIT (OUTPATIENT)
Dept: CARDIOLOGY | Age: 65
End: 2020-04-27
Payer: MEDICARE

## 2020-04-27 PROCEDURE — 4040F PNEUMOC VAC/ADMIN/RCVD: CPT | Performed by: INTERNAL MEDICINE

## 2020-04-27 PROCEDURE — 99213 OFFICE O/P EST LOW 20 MIN: CPT | Performed by: INTERNAL MEDICINE

## 2020-04-27 PROCEDURE — G8427 DOCREV CUR MEDS BY ELIG CLIN: HCPCS | Performed by: INTERNAL MEDICINE

## 2020-04-27 PROCEDURE — 3017F COLORECTAL CA SCREEN DOC REV: CPT | Performed by: INTERNAL MEDICINE

## 2020-04-27 PROCEDURE — 4004F PT TOBACCO SCREEN RCVD TLK: CPT | Performed by: INTERNAL MEDICINE

## 2020-04-27 PROCEDURE — 1123F ACP DISCUSS/DSCN MKR DOCD: CPT | Performed by: INTERNAL MEDICINE

## 2020-04-27 PROCEDURE — G8420 CALC BMI NORM PARAMETERS: HCPCS | Performed by: INTERNAL MEDICINE

## 2020-04-27 ASSESSMENT — ENCOUNTER SYMPTOMS
NAUSEA: 0
VOMITING: 0
DIARRHEA: 0
SHORTNESS OF BREATH: 0
GASTROINTESTINAL NEGATIVE: 1
RESPIRATORY NEGATIVE: 1
EYES NEGATIVE: 1

## 2020-04-27 NOTE — PROGRESS NOTES
[facility] Progress Note                            Date:  4/27/2020  Patient: Otilia Jaime  Age:  72 y.o., 1955      Reason for evaluation:           SUBJECTIVE:    Talk to the patient today via phone visit could not arrange for the video visit. He was recently diagnosed with liver cancer history of alcoholic liver cirrhosis. He quit drinking about 5 years ago. From a heart standpoint denies anginal chest pain he has had several stents in the past denies dyspnea. Does not take nitroglycerin. No other complaints. Review of Systems   Constitutional: Negative. Negative for chills, fever and unexpected weight change. HENT: Negative. Eyes: Negative. Respiratory: Negative. Negative for shortness of breath. Cardiovascular: Negative. Negative for chest pain. Gastrointestinal: Negative. Negative for diarrhea, nausea and vomiting. Endocrine: Negative. Genitourinary: Negative. Musculoskeletal: Negative. Skin: Negative. Neurological: Negative. OBJECTIVE:     There were no vitals taken for this visit. Labs:   CBC: No results for input(s): WBC, HGB, HCT, PLT in the last 72 hours. BMP:No results for input(s): NA, K, CO2, BUN, CREATININE, LABGLOM, GLUCOSE in the last 72 hours. BNP: No results for input(s): BNP in the last 72 hours. PT/INR: No results for input(s): PROTIME, INR in the last 72 hours. APTT:No results for input(s): APTT in the last 72 hours. CARDIAC ENZYMES:No results for input(s): CKTOTAL, CKMB, CKMBINDEX, TROPONINI in the last 72 hours. FASTING LIPID PANEL:  Lab Results   Component Value Date    HDL 22 02/27/2018    LDLDIRECT 105 02/23/2013    LDLCALC 40 02/27/2018    TRIG 119 02/27/2018     LIVER PROFILE:No results for input(s): AST, ALT, LABALBU in the last 72 hours.         Past Medical History:   Diagnosis Date    Bradycardia     3/5/13  loop recorder    Carotid artery stenosis     Chronic kidney disease     Cigarette nicotine present medications  2. Recommend follow-up assessment in 6 months  3. Total time spent on this visit 16 minutes    Return in about 6 months (around 10/27/2020) for return to Dr. Margaret Peter only. Yuan Barajas MD 4/27/2020 2:55 PM CDT    [id number] [facility]      Thisdictation was generated by voice recognition computer software. Although all attempts are made to edit the dictation for accuracy, there may be errors in the transcription that are not intended.

## 2020-04-29 ENCOUNTER — TELEPHONE (OUTPATIENT)
Dept: CARDIOLOGY | Age: 65
End: 2020-04-29

## 2020-04-29 PROCEDURE — 93294 REM INTERROG EVL PM/LDLS PM: CPT | Performed by: CLINICAL NURSE SPECIALIST

## 2020-04-29 PROCEDURE — 93296 REM INTERROG EVL PM/IDS: CPT | Performed by: CLINICAL NURSE SPECIALIST

## 2020-04-29 NOTE — TELEPHONE ENCOUNTER
Called and reminded patient to send [facility] remote pacmaker interrogation.

## 2020-05-20 ENCOUNTER — HOSPITAL ENCOUNTER (OUTPATIENT)
Dept: CT IMAGING | Age: 65
Discharge: HOME | End: 2020-05-20
Payer: MEDICARE

## 2020-05-20 PROCEDURE — 74170 CT ABD WO CNTRST FLWD CNTRST: CPT

## 2020-05-20 PROCEDURE — 6360000004 HC RX CONTRAST MEDICATION: Performed by: INTERNAL MEDICINE

## 2020-05-20 RX ADMIN — IOPAMIDOL 90 ML: 755 INJECTION, SOLUTION INTRAVENOUS at 11:36

## 2020-06-08 RX ORDER — LISINOPRIL 2.5 MG/1
TABLET ORAL
Qty: 60 TABLET | Refills: 5 | Status: SHIPPED | OUTPATIENT
Start: 2020-06-08 | End: 2020-09-11

## 2020-06-20 ENCOUNTER — HOSPITAL ENCOUNTER (EMERGENCY)
Age: 65
Discharge: LEFT BEFORE DISCHARGE | End: 2020-06-20
Payer: MEDICARE

## 2020-06-20 VITALS
OXYGEN SATURATION: 93 % | BODY MASS INDEX: 24.64 KG/M2 | TEMPERATURE: 97.9 F | HEART RATE: 80 BPM | HEIGHT: 67 IN | RESPIRATION RATE: 18 BRPM | WEIGHT: 157 LBS | SYSTOLIC BLOOD PRESSURE: 178 MMHG | DIASTOLIC BLOOD PRESSURE: 98 MMHG

## 2020-06-20 PROCEDURE — 4500000002 HC ER NO CHARGE

## 2020-07-31 PROCEDURE — 93294 REM INTERROG EVL PM/LDLS PM: CPT | Performed by: CLINICAL NURSE SPECIALIST

## 2020-07-31 PROCEDURE — 93296 REM INTERROG EVL PM/IDS: CPT | Performed by: CLINICAL NURSE SPECIALIST

## 2020-08-03 ENCOUNTER — HOSPITAL ENCOUNTER (EMERGENCY)
Age: 65
Discharge: HOME | End: 2020-08-03
Payer: MEDICARE

## 2020-08-03 VITALS
SYSTOLIC BLOOD PRESSURE: 141 MMHG | TEMPERATURE: 98.8 F | DIASTOLIC BLOOD PRESSURE: 83 MMHG | HEART RATE: 83 BPM | RESPIRATION RATE: 16 BRPM | OXYGEN SATURATION: 95 %

## 2020-08-03 PROCEDURE — 99282 EMERGENCY DEPT VISIT SF MDM: CPT

## 2020-08-03 PROCEDURE — 10060 I&D ABSCESS SIMPLE/SINGLE: CPT

## 2020-08-03 RX ORDER — SULFAMETHOXAZOLE AND TRIMETHOPRIM 800; 160 MG/1; MG/1
1 TABLET ORAL 2 TIMES DAILY
Qty: 20 TABLET | Refills: 0 | Status: SHIPPED | OUTPATIENT
Start: 2020-08-03 | End: 2020-08-13

## 2020-08-03 ASSESSMENT — ENCOUNTER SYMPTOMS
PHOTOPHOBIA: 0
SHORTNESS OF BREATH: 0
APNEA: 0
BACK PAIN: 0
EYE ITCHING: 0
COLOR CHANGE: 0
COUGH: 0
EYE DISCHARGE: 0

## 2020-08-03 NOTE — ED PROVIDER NOTES
[address] [facility] EMERGENCY DEPT  eMERGENCYdEPARTMENT eNCOUnter      Pt Name: Caren Canavan  MRN: [id number]  [place] 1955  Date of evaluation: 8/3/2020  Provider:KELECHI Solano    CHIEF COMPLAINT       Chief Complaint   Patient presents with    Abscess     reports spot on beltline         HISTORY OF PRESENT ILLNESS  (Location/Symptom, Timing/Onset, Context/Setting, Quality, Duration, Modifying Factors, Severity.)   Caren Canavan is a 72 y.o. male who presents to the emergency department with complaints of possible abscess about his left side inguinal fold patient admits to having this area shaved recently he has a liver cancer history is taking oral chemo medication. Drained it once at home admits to purulent discharge denies fever. The spot has swollen up again. Hx of MRSA listed his tetanus is up to date. Denies spider bite or tick hx. Codeine allergy. HPI    Nursing Notes were reviewed and I agree. REVIEW OF SYSTEMS    (2-9 systems for level 4, 10 or more for level 5)     Review of Systems   Constitutional: Negative for activity change, appetite change, chills and fever. HENT: Negative for congestion and dental problem. Eyes: Negative for photophobia, discharge and itching. Respiratory: Negative for apnea, cough and shortness of breath. Cardiovascular: Negative for chest pain. Musculoskeletal: Negative for arthralgias, back pain, gait problem, myalgias and neck pain. Skin: Positive for wound. Negative for color change, pallor and rash. Neurological: Negative for dizziness, seizures and syncope. Psychiatric/Behavioral: Negative for agitation. The patient is not nervous/anxious. Except as noted above the remainder of the review of systems was reviewed and negative.        PAST MEDICAL HISTORY     Past Medical History:   Diagnosis Date    Bradycardia     3/5/13  loop recorder    Carotid artery stenosis     Chronic kidney disease     Cigarette nicotine dependence in remission 2/21/2018    Hypoglycemia     Mixed hyperlipidemia 2/21/2018    Near syncope     Osteoarthritis     Pacemaker 05/22/2013    loop recorder removed    S/P carotid endarterectomy     right internal carotid    Sinoatrial node dysfunction ([facility] [address] 2/15/2018    3/5/13  loop recorder 5/22/2013  Dual chamber PPM    Tobacco abuse          SURGICAL HISTORY       Past Surgical History:   Procedure Laterality Date    CORONARY ANGIOPLASTY WITH STENT PLACEMENT  02/15/2018    SP prox LAD and Osteal diagonal    CORONARY ANGIOPLASTY WITH STENT PLACEMENT  02/26/2018    SP to osteal    PACEMAKER INSERTION  5/22/2013    VASCULAR SURGERY  2-27-13 [facility]    Right Carotid endarterectomy, eversion technique with completion duplex US         CURRENT MEDICATIONS       Discharge Medication List as of 8/3/2020 11:07 AM      CONTINUE these medications which have NOT CHANGED    Details   lisinopril (PRINIVIL;ZESTRIL) 2.5 MG tablet TAKE ONE TABLET BY MOUTH TWO TIMES A DAY., Disp-60 tablet, R-5Normal      venlafaxine (EFFEXOR) 37.5 MG tablet Take 37.5 mg by mouth dailyHistorical Med      !! atorvastatin (LIPITOR) 40 MG tablet Take 1 tablet by mouth daily, Disp-30 tablet, R-5Normal      !! atorvastatin (LIPITOR) 40 MG tablet TAKE ONE TABLET BY MOUTH NIGHTLY, Disp-30 tablet, R-5Normal      atenolol (TENORMIN) 25 MG tablet TAKE 1 TABLET BY MOUTH DAILY, Disp-30 tablet, R-5Normal      ticagrelor (BRILINTA) 90 MG TABS tablet Take 1 tablet by mouth 2 times daily, Disp-60 tablet, R-5Normal      aspirin 81 MG chewable tablet Take 1 tablet by mouth daily, Disp-30 tablet, R-3Normal       !! - Potential duplicate medications found. Please discuss with provider.           ALLERGIES     Codeine    FAMILY HISTORY       Family History   Problem Relation Age of Onset    Diabetes Mother     Heart Disease Father     High Blood Pressure Father     Diabetes Father     Stroke Father           SOCIAL HISTORY       Social History     Socioeconomic History    Marital status:      Spouse name: None    Number of children: None    Years of education: None    Highest education level: None   Occupational History    None   Social Needs    Financial resource strain: None    Food insecurity     Worry: None     Inability: None    Transportation needs     Medical: None     Non-medical: None   Tobacco Use    Smoking status: Current Every Day Smoker     Packs/day: 0.25     Types: Cigarettes    Smokeless tobacco: Former User     Types: Chew   Substance and Sexual Activity    Alcohol use: No     Alcohol/week: 0.0 standard drinks    Drug use: No     Comment: Past Use    Sexual activity: Never     Partners: Female   Lifestyle    Physical activity     Days per week: None     Minutes per session: None    Stress: None   Relationships    Social connections     Talks on phone: None     Gets together: None     Attends Mu-ism service: None     Active member of club or organization: None     Attends meetings of clubs or organizations: None     Relationship status: None    Intimate partner violence     Fear of current or ex partner: None     Emotionally abused: None     Physically abused: None     Forced sexual activity: None   Other Topics Concern    None   Social History Narrative    None       SCREENINGS           PHYSICAL EXAM    (up to 7 forlevel 4, 8 or more for level 5)     ED Triage Vitals [08/03/20 1024]   BP Temp Temp src Pulse Resp SpO2 Height Weight   (!) 141/83 98.8 °F (37.1 °C) -- 83 16 95 % -- --       Physical Exam  Vitals signs and nursing note reviewed. Constitutional:       General: He is not in acute distress. Appearance: Normal appearance. He is well-developed. He is not diaphoretic. HENT:      Head: Normocephalic and atraumatic.       Right Ear: Tympanic membrane, ear canal and external ear normal.      Left Ear: Tympanic membrane, ear canal and external ear normal.      Nose: Nose normal.      Mouth/Throat:      Mouth: Mucous membranes are moist.   Eyes:      Pupils: Pupils are equal, round, and reactive to light. Neck:      Musculoskeletal: Normal range of motion and neck supple. Trachea: No tracheal deviation. Cardiovascular:      Rate and Rhythm: Normal rate and regular rhythm. Pulses: Normal pulses. Heart sounds: Normal heart sounds. No murmur. Pulmonary:      Effort: Pulmonary effort is normal.      Breath sounds: Normal breath sounds. No stridor. No wheezing. Chest:      Chest wall: No tenderness. Abdominal:      General: Bowel sounds are normal. There is no distension. Palpations: Abdomen is soft. Tenderness: There is no abdominal tenderness. Musculoskeletal: Normal range of motion. Skin:     General: Skin is warm and dry. Capillary Refill: Capillary refill takes less than 2 seconds. Neurological:      General: No focal deficit present. Mental Status: He is alert and oriented to person, place, and time. Mental status is at baseline. Psychiatric:         Mood and Affect: Mood normal.         Behavior: Behavior normal.         Thought Content: Thought content normal.         Judgment: Judgment normal.           DIAGNOSTIC RESULTS     RADIOLOGY:   Non-plain film images such as CT, Ultrasound and MRI are read by the radiologist. Plain radiographic images are visualized and preliminarilyinterpreted by No att. providers found with the below findings:      Interpretation per the Radiologist below, if available at the time of this note:    No orders to display       LABS:  Labs Reviewed - No data to display    All other labs were within normal range or notreturned as of this dictation. RE-ASSESSMENT        EMERGENCY DEPARTMENT COURSE and DIFFERENTIAL DIAGNOSIS/MDM:   Vitals:    Vitals:    08/03/20 1024   BP: (!) 141/83   Pulse: 83   Resp: 16   Temp: 98.8 °F (37.1 °C)   SpO2: 95%       MDM  Patient tolerates procedure well.   I have flushed and irrigated with saline culture has been swamped I will start him on Bactrim to cover for MRSA with his history. He is to follow-up with us in 2 days for wound evaluation. Return to ER if anything should worsen. PROCEDURES:    Incision/Drainage    Date/Time: 8/3/2020 3:30 PM  Performed by: KELECHI Bocanegra  Authorized by: KELECHI Bocanegra     Consent:     Consent obtained:  Verbal    Consent given by:  Patient    Risks discussed:  Incomplete drainage  Location:     Type:  Abscess    Location:  Trunk    Trunk location:  Abdomen  Procedure type:     Complexity:  Simple  Procedure details:     Incision types:  Stab incision    Incision depth:  Dermal    Scalpel blade:  11    Wound management:  Probed and deloculated    Drainage:  Bloody and purulent    Drainage amount: Moderate    Packing materials:  None  Post-procedure details:     Patient tolerance of procedure: Tolerated well, no immediate complications          FINAL IMPRESSION      1.  Abscess          DISPOSITION/PLAN   DISPOSITION        PATIENT REFERRED TO:  [facility] EMERGENCY DEPT  Kishore Karlabrancarolynmaria esther  [phone]  In 2 days  For wound re-check      DISCHARGE MEDICATIONS:  Discharge Medication List as of 8/3/2020 11:07 AM      START taking these medications    Details   sulfamethoxazole-trimethoprim (BACTRIM DS) 800-160 MG per tablet Take 1 tablet by mouth 2 times daily for 10 days, Disp-20 tablet,R-0Print             (Please note that portions of this note were completed with a voice recognition program.  Efforts were made to edit the dictations but occasionallywords are mis-transcribed.)    Octavio Bocanegra [address], [place]  08/03/20 1934

## 2020-08-26 ENCOUNTER — HOSPITAL ENCOUNTER (EMERGENCY)
Age: 65
Discharge: HOME | End: 2020-08-26
Payer: MEDICARE

## 2020-08-26 VITALS
HEART RATE: 81 BPM | RESPIRATION RATE: 18 BRPM | WEIGHT: 160 LBS | OXYGEN SATURATION: 93 % | SYSTOLIC BLOOD PRESSURE: 185 MMHG | DIASTOLIC BLOOD PRESSURE: 101 MMHG | BODY MASS INDEX: 25.06 KG/M2 | TEMPERATURE: 97.3 F

## 2020-08-26 PROCEDURE — 12001 RPR S/N/AX/GEN/TRNK 2.5CM/<: CPT

## 2020-08-26 PROCEDURE — 99282 EMERGENCY DEPT VISIT SF MDM: CPT

## 2020-08-26 PROCEDURE — 6370000000 HC RX 637 (ALT 250 FOR IP)

## 2020-08-26 PROCEDURE — 96372 THER/PROPH/DIAG INJ SC/IM: CPT

## 2020-08-26 PROCEDURE — 2500000003 HC RX 250 WO HCPCS: Performed by: PHYSICIAN ASSISTANT

## 2020-08-26 RX ORDER — CLONIDINE HYDROCHLORIDE 0.1 MG/1
0.1 TABLET ORAL ONCE
Status: COMPLETED | OUTPATIENT
Start: 2020-08-26 | End: 2020-08-26

## 2020-08-26 RX ORDER — CLONIDINE HYDROCHLORIDE 0.1 MG/1
TABLET ORAL
Status: COMPLETED
Start: 2020-08-26 | End: 2020-08-26

## 2020-08-26 RX ORDER — BUPIVACAINE HYDROCHLORIDE 5 MG/ML
30 INJECTION, SOLUTION EPIDURAL; INTRACAUDAL ONCE
Status: COMPLETED | OUTPATIENT
Start: 2020-08-26 | End: 2020-08-26

## 2020-08-26 RX ORDER — SULFAMETHOXAZOLE AND TRIMETHOPRIM 800; 160 MG/1; MG/1
1 TABLET ORAL 2 TIMES DAILY
Qty: 20 TABLET | Refills: 0 | Status: SHIPPED | OUTPATIENT
Start: 2020-08-26 | End: 2020-09-05

## 2020-08-26 RX ADMIN — CLONIDINE HYDROCHLORIDE 0.1 MG: 0.1 TABLET ORAL at 11:28

## 2020-08-26 RX ADMIN — BUPIVACAINE HYDROCHLORIDE 150 MG: 5 INJECTION, SOLUTION EPIDURAL; INTRACAUDAL; PERINEURAL at 10:45

## 2020-08-26 ASSESSMENT — ENCOUNTER SYMPTOMS
COUGH: 0
EYE DISCHARGE: 0
APNEA: 0
PHOTOPHOBIA: 0
SHORTNESS OF BREATH: 0
COLOR CHANGE: 0
BACK PAIN: 0
EYE ITCHING: 0

## 2020-08-26 ASSESSMENT — PAIN SCALES - GENERAL: PAINLEVEL_OUTOF10: 3

## 2020-08-26 NOTE — ED NOTES
PT states he is ready to go and no longer wants to wait to recheck BP. Chris LORENZ aware. PT is aware of the benefits versus risks, is asymptomatic and A&Ox4.      Lexi Mcdaniel RN  08/26/20 [id number]

## 2020-08-26 NOTE — ED PROVIDER NOTES
2/21/2018    Hypoglycemia     Mixed hyperlipidemia 2/21/2018    Near syncope     Osteoarthritis     Pacemaker 05/22/2013    loop recorder removed    S/P carotid endarterectomy     right internal carotid    Sinoatrial node dysfunction ([facility] [address] 2/15/2018    3/5/13  loop recorder 5/22/2013  Dual chamber PPM    Tobacco abuse          SURGICAL HISTORY       Past Surgical History:   Procedure Laterality Date    CORONARY ANGIOPLASTY WITH STENT PLACEMENT  02/15/2018    SP prox LAD and Osteal diagonal    CORONARY ANGIOPLASTY WITH STENT PLACEMENT  02/26/2018    SP to osteal    PACEMAKER INSERTION  5/22/2013    VASCULAR SURGERY  2-27-13 [facility]    Right Carotid endarterectomy, eversion technique with completion duplex US         CURRENT MEDICATIONS       Discharge Medication List as of 8/26/2020 11:14 AM      CONTINUE these medications which have NOT CHANGED    Details   lisinopril (PRINIVIL;ZESTRIL) 2.5 MG tablet TAKE ONE TABLET BY MOUTH TWO TIMES A DAY., Disp-60 tablet, R-5Normal      venlafaxine (EFFEXOR) 37.5 MG tablet Take 37.5 mg by mouth dailyHistorical Med      !! atorvastatin (LIPITOR) 40 MG tablet Take 1 tablet by mouth daily, Disp-30 tablet, R-5Normal      !! atorvastatin (LIPITOR) 40 MG tablet TAKE ONE TABLET BY MOUTH NIGHTLY, Disp-30 tablet, R-5Normal      atenolol (TENORMIN) 25 MG tablet TAKE 1 TABLET BY MOUTH DAILY, Disp-30 tablet, R-5Normal      ticagrelor (BRILINTA) 90 MG TABS tablet Take 1 tablet by mouth 2 times daily, Disp-60 tablet, R-5Normal      aspirin 81 MG chewable tablet Take 1 tablet by mouth daily, Disp-30 tablet, R-3Normal       !! - Potential duplicate medications found. Please discuss with provider.           ALLERGIES     Codeine    FAMILY HISTORY       Family History   Problem Relation Age of Onset    Diabetes Mother     Heart Disease Father     High Blood Pressure Father     Diabetes Father     Stroke Father           SOCIAL HISTORY       Social History     Socioeconomic History  Marital status:      Spouse name: None    Number of children: None    Years of education: None    Highest education level: None   Occupational History    None   Social Needs    Financial resource strain: None    Food insecurity     Worry: None     Inability: None    Transportation needs     Medical: None     Non-medical: None   Tobacco Use    Smoking status: Current Every Day Smoker     Packs/day: 0.25     Types: Cigarettes    Smokeless tobacco: Former User     Types: Chew   Substance and Sexual Activity    Alcohol use: No     Alcohol/week: 0.0 standard drinks    Drug use: No     Comment: Past Use    Sexual activity: Never     Partners: Female   Lifestyle    Physical activity     Days per week: None     Minutes per session: None    Stress: None   Relationships    Social connections     Talks on phone: None     Gets together: None     Attends Jew service: None     Active member of club or organization: None     Attends meetings of clubs or organizations: None     Relationship status: None    Intimate partner violence     Fear of current or ex partner: None     Emotionally abused: None     Physically abused: None     Forced sexual activity: None   Other Topics Concern    None   Social History Narrative    None       SCREENINGS           PHYSICAL EXAM    (up to 7 forlevel 4, 8 or more for level 5)     ED Triage Vitals [08/26/20 1008]   BP Temp Temp src Pulse Resp SpO2 Height Weight   (!) 185/104 97.3 °F (36.3 °C) -- 90 18 93 % -- 160 lb (72.6 kg)       Physical Exam  Vitals signs and nursing note reviewed. Constitutional:       General: He is not in acute distress. Appearance: Normal appearance. He is well-developed. He is not diaphoretic. HENT:      Head: Normocephalic and atraumatic.       Right Ear: Tympanic membrane, ear canal and external ear normal.      Left Ear: Tympanic membrane, ear canal and external ear normal.      Nose: Nose normal.      Mouth/Throat:      Mouth: (36.3 °C)    SpO2: 93%    Weight: 160 lb (72.6 kg)        MDM  Patient tolerates procedure well. We will discharge at this time. He is to have the sutures removed in 7 days. Patient complains of spot on his beltline again concerning for recurrent abscess nothing palpable at this time to drain. I placed him on antibiotics for prophylaxis anyway and I would like him to follow-up with general for potential surgical removal if this continues to be a recurring issue. PROCEDURES:    Lac Repair    Date/Time: 8/26/2020 10:36 PM  Performed by: KELECHI Paez  Authorized by: KELECHI Paez     Consent:     Consent obtained:  Verbal    Consent given by:  Patient    Risks discussed:  Pain, infection and poor cosmetic result  Anesthesia (see MAR for exact dosages): Anesthesia method:  Local infiltration    Local anesthetic:  Bupivacaine 0.5% w/o epi  Laceration details:     Location:  Shoulder/arm    Shoulder/arm location:  L lower arm    Length (cm):  2  Repair type:     Repair type:  Simple  Pre-procedure details:     Preparation:  Patient was prepped and draped in usual sterile fashion  Exploration:     Hemostasis achieved with:  Direct pressure    Wound exploration: wound explored through full range of motion      Contaminated: no    Treatment:     Area cleansed with:  Betadine and saline    Amount of cleaning:  Standard    Irrigation solution:  Sterile saline    Visualized foreign bodies/material removed: no    Skin repair:     Repair method:  Sutures    Suture size:  4-0    Suture material:  Nylon    Suture technique:  Simple interrupted    Number of sutures:  4  Approximation:     Approximation:  Close  Post-procedure details:     Dressing:  Antibiotic ointment and adhesive bandage    Patient tolerance of procedure: Tolerated well, no immediate complications          FINAL IMPRESSION      1.  Arm laceration, left, initial encounter          DISPOSITION/PLAN   DISPOSITION Decision To Discharge 08/26/2020 11:10:09 AM      PATIENT REFERRED TO:  [facility] EMERGENCY DEPT  Kishore Irene  [phone]  In 7 days  For suture removal    Terry Buckley[address]  [phone]    Schedule an appointment as soon as possible for a visit         DISCHARGE MEDICATIONS:  Discharge Medication List as of 8/26/2020 11:14 AM      START taking these medications    Details   sulfamethoxazole-trimethoprim (BACTRIM DS) 800-160 MG per tablet Take 1 tablet by mouth 2 times daily for 10 days, Disp-20 tablet,R-0Print             (Please note that portions of this note were completed with a voice recognition program.  Efforts were made to edit the dictations but occasionallywords are mis-transcribed.)    Octavio Martin [phone], [phone]  08/26/20 0968

## 2020-08-31 ENCOUNTER — HOSPITAL ENCOUNTER (OUTPATIENT)
Dept: CT IMAGING | Age: 65
Discharge: HOME | End: 2020-08-31
Payer: MEDICARE

## 2020-08-31 LAB
GFR AFRICAN AMERICAN: >60
GFR NON-AFRICAN AMERICAN: >60
PERFORMED ON: NORMAL
POC CREATININE: 1.2 MG/DL (ref 0.3–1.3)
POC SAMPLE TYPE: NORMAL

## 2020-08-31 PROCEDURE — 82565 ASSAY OF CREATININE: CPT

## 2020-08-31 PROCEDURE — 6360000004 HC RX CONTRAST MEDICATION: Performed by: INTERNAL MEDICINE

## 2020-08-31 PROCEDURE — 74178 CT ABD&PLV WO CNTR FLWD CNTR: CPT

## 2020-08-31 RX ADMIN — IOPAMIDOL 90 ML: 755 INJECTION, SOLUTION INTRAVENOUS at 08:23

## 2020-09-02 ENCOUNTER — HOSPITAL ENCOUNTER (EMERGENCY)
Age: 65
Discharge: HOME | End: 2020-09-02
Payer: MEDICARE

## 2020-09-02 VITALS
BODY MASS INDEX: 25.11 KG/M2 | TEMPERATURE: 98.2 F | RESPIRATION RATE: 16 BRPM | HEIGHT: 67 IN | HEART RATE: 79 BPM | WEIGHT: 160 LBS | SYSTOLIC BLOOD PRESSURE: 152 MMHG | DIASTOLIC BLOOD PRESSURE: 52 MMHG | OXYGEN SATURATION: 98 %

## 2020-09-02 PROCEDURE — 99281 EMR DPT VST MAYX REQ PHY/QHP: CPT

## 2020-09-02 NOTE — ED PROVIDER NOTES
technique with completion duplex US         CURRENT MEDICATIONS       Previous Medications    ASPIRIN 81 MG CHEWABLE TABLET    Take 1 tablet by mouth daily    ATENOLOL (TENORMIN) 25 MG TABLET    TAKE 1 TABLET BY MOUTH DAILY    ATORVASTATIN (LIPITOR) 40 MG TABLET    TAKE ONE TABLET BY MOUTH NIGHTLY    ATORVASTATIN (LIPITOR) 40 MG TABLET    Take 1 tablet by mouth daily    LISINOPRIL (PRINIVIL;ZESTRIL) 2.5 MG TABLET    TAKE ONE TABLET BY MOUTH TWO TIMES A DAY.     SULFAMETHOXAZOLE-TRIMETHOPRIM (BACTRIM DS) 800-160 MG PER TABLET    Take 1 tablet by mouth 2 times daily for 10 days    TICAGRELOR (BRILINTA) 90 MG TABS TABLET    Take 1 tablet by mouth 2 times daily    VENLAFAXINE (EFFEXOR) 37.5 MG TABLET    Take 37.5 mg by mouth daily       ALLERGIES     Codeine    FAMILY HISTORY       Family History   Problem Relation Age of Onset    Diabetes Mother     Heart Disease Father     High Blood Pressure Father     Diabetes Father     Stroke Father           SOCIAL HISTORY       Social History     Socioeconomic History    Marital status:      Spouse name: Not on file    Number of children: Not on file    Years of education: Not on file    Highest education level: Not on file   Occupational History    Not on file   Social Needs    Financial resource strain: Not on file    Food insecurity     Worry: Not on file     Inability: Not on file    Transportation needs     Medical: Not on file     Non-medical: Not on file   Tobacco Use    Smoking status: Current Every Day Smoker     Packs/day: 0.25     Types: Cigarettes    Smokeless tobacco: Former User     Types: Chew   Substance and Sexual Activity    Alcohol use: No     Alcohol/week: 0.0 standard drinks    Drug use: No     Comment: Past Use    Sexual activity: Never     Partners: Female   Lifestyle    Physical activity     Days per week: Not on file     Minutes per session: Not on file    Stress: Not on file   Relationships    Social connections     Talks on phone: Not on file     Gets together: Not on file     Attends Holiness service: Not on file     Active member of club or organization: Not on file     Attends meetings of clubs or organizations: Not on file     Relationship status: Not on file    Intimate partner violence     Fear of current or ex partner: Not on file     Emotionally abused: Not on file     Physically abused: Not on file     Forced sexual activity: Not on file   Other Topics Concern    Not on file   Social History Narrative    Not on file       SCREENINGS             PHYSICAL EXAM    (up to 7 for level 4, 8 or more for level 5)     ED Triage Vitals [09/02/20 1332]   BP Temp Temp Source Pulse Resp SpO2 Height Weight   (!) 152/52 98.2 °F (36.8 °C) Oral 79 16 98 % 5' 7\" (1.702 m) 160 lb (72.6 kg)       Physical Exam  Vitals signs reviewed. Cardiovascular:      Rate and Rhythm: Normal rate. Pulmonary:      Effort: Pulmonary effort is normal.   Skin:     Comments: Left dorsal distal forearm with linear laceration with well approximated wound edges/sutures intact. No erythema noted, mild edema to lateral wound border without secondary sign of infection   Neurological:      Mental Status: He is alert and oriented to person, place, and time. DIAGNOSTIC RESULTS     EKG: All EKG's are interpreted by the Emergency Department Physician who either signs or Co-signs this chart in the absence of acardiologist.        RADIOLOGY:   Non-plain film images such as CT, Ultrasound andMRI are read by the radiologist. Plain radiographic images are visualized and preliminarily interpreted by the emergency physician with the below findings:        Interpretation per the Radiologist below, if available at the time of this note:    No orders to display         ED BEDSIDE ULTRASOUND:   Performed by ED Physician - none    LABS:  Labs Reviewed - No data to display    All other labs were within normal range or not returned as of this dictation.     RE-ASSESSMENT EMERGENCY DEPARTMENT COURSE and DIFFERENTIALDIAGNOSIS/MDM:   Vitals:    Vitals:    09/02/20 1332   BP: (!) 152/52   Pulse: 79   Resp: 16   Temp: 98.2 °F (36.8 °C)   TempSrc: Oral   SpO2: 98%   Weight: 160 lb (72.6 kg)   Height: 5' 7\" (1.702 m)       MDM      CONSULTS:  None    PROCEDURES:  Unless otherwise notedbelow, none     Procedures    FINAL IMPRESSION     1.  Visit for suture removal          DISPOSITION/PLAN   DISPOSITION        PATIENT REFERRED TO:  DO Ad Cooper    Schedule an appointment as soon as possible for a visit   As needed      DISCHARGE MEDICATIONS:       Current Discharge Medication List          (Pleasenote that portions of this note were completed with a voice recognition program.  Efforts were made to edit the dictations but occasionally words are mis-transcribed.)              Jermain Olivera, APRN  09/02/20 1333

## 2020-09-11 ENCOUNTER — HOSPITAL ENCOUNTER (EMERGENCY)
Age: 65
Discharge: HOME | End: 2020-09-11
Payer: MEDICARE

## 2020-09-11 ENCOUNTER — APPOINTMENT (OUTPATIENT)
Dept: GENERAL RADIOLOGY | Age: 65
End: 2020-09-11
Payer: MEDICARE

## 2020-09-11 ENCOUNTER — TELEPHONE (OUTPATIENT)
Dept: CARDIOLOGY | Age: 65
End: 2020-09-11

## 2020-09-11 VITALS
TEMPERATURE: 97.4 F | OXYGEN SATURATION: 97 % | HEIGHT: 66 IN | BODY MASS INDEX: 27.32 KG/M2 | RESPIRATION RATE: 16 BRPM | WEIGHT: 170 LBS | SYSTOLIC BLOOD PRESSURE: 192 MMHG | DIASTOLIC BLOOD PRESSURE: 97 MMHG | HEART RATE: 62 BPM

## 2020-09-11 PROCEDURE — 99283 EMERGENCY DEPT VISIT LOW MDM: CPT

## 2020-09-11 PROCEDURE — 73630 X-RAY EXAM OF FOOT: CPT

## 2020-09-11 PROCEDURE — 99999 PR OFFICE/OUTPT VISIT,PROCEDURE ONLY: CPT | Performed by: NURSE PRACTITIONER

## 2020-09-11 PROCEDURE — 93971 EXTREMITY STUDY: CPT

## 2020-09-11 RX ORDER — M-VIT,TX,IRON,MINS/CALC/FOLIC 27MG-0.4MG
1 TABLET ORAL DAILY
COMMUNITY

## 2020-09-11 ASSESSMENT — ENCOUNTER SYMPTOMS: VOMITING: 0

## 2020-09-11 NOTE — ED PROVIDER NOTES
[facility]  eMERGENCY dEPARTMENT eNCOUnter      Pt Name: Adamaris Chaudhry  MRN: [id number]  [place] 1955  Date of evaluation: 9/11/2020  Provider: [address]       Chief Complaint   Patient presents with    Foot Pain     and bruising to right foot, no injury, pulses palpable         HISTORY OF PRESENT ILLNESS   (Location/Symptom, Timing/Onset,Context/Setting, Quality, Duration, Modifying Factors, Severity)  Note limiting factors. Adamaris Chaudhry is a 72 y.o. male who presents to the emergency department with pain and bruising to his right foot. He is concerned about a blood clot. He has not had blood clots before. He denies any injury. The pain is gone on for 3 days. The history is provided by the patient. Foot Pain   This is a new problem. The current episode started more than 2 days ago. The problem occurs constantly. The problem has not changed since onset. NursingNotes were reviewed. REVIEW OF SYSTEMS    (2-9 systems for level 4, 10 or more for level 5)     Review of Systems   Constitutional: Negative for fever. Gastrointestinal: Negative for vomiting. Musculoskeletal: Positive for arthralgias and myalgias. Skin: Negative for wound. Except as noted above the remainder of the review of systems was reviewed and negative.        PAST MEDICAL HISTORY     Past Medical History:   Diagnosis Date    Bradycardia     3/5/13  loop recorder    Carotid artery stenosis     Chronic kidney disease     Cigarette nicotine dependence in remission 2/21/2018    Hypoglycemia     Mixed hyperlipidemia 2/21/2018    Near syncope     Osteoarthritis     Pacemaker 05/22/2013    loop recorder removed    S/P carotid endarterectomy     right internal carotid    Sinoatrial node dysfunction ([facility] [address] 2/15/2018    3/5/13  loop recorder 5/22/2013  Dual chamber PPM    Tobacco abuse          SURGICALHISTORY       Past Surgical History:   Procedure Laterality Date    CORONARY Physical activity     Days per week: None     Minutes per session: None    Stress: None   Relationships    Social connections     Talks on phone: None     Gets together: None     Attends Yarsani service: None     Active member of club or organization: None     Attends meetings of clubs or organizations: None     Relationship status: None    Intimate partner violence     Fear of current or ex partner: None     Emotionally abused: None     Physically abused: None     Forced sexual activity: None   Other Topics Concern    None   Social History Narrative    None       SCREENINGS    Ema Coma Scale  Eye Opening: Spontaneous  Best Verbal Response: Oriented  Best Motor Response: Obeys commands  [place] Coma Scale Score: 15 @FLOW([id number])@      PHYSICAL EXAM    (up to 7 for level 4, 8 or more for level 5)     ED Triage Vitals [09/11/20 1130]   BP Temp Temp src Pulse Resp SpO2 Height Weight   (!) 170/102 97.4 °F (36.3 °C) -- 65 17 99 % 5' 6\" (1.676 m) 170 lb (77.1 kg)       Physical Exam  Vitals signs and nursing note reviewed. Constitutional:       Appearance: He is well-developed. HENT:      Head: Normocephalic and atraumatic. Eyes:      General: No scleral icterus. Right eye: No discharge. Left eye: No discharge. Neck:      Musculoskeletal: Normal range of motion and neck supple. Cardiovascular:      Rate and Rhythm: Normal rate. Pulmonary:      Effort: No respiratory distress. Musculoskeletal:      Right foot: Normal range of motion and normal capillary refill. Tenderness present. No swelling, deformity or laceration. Feet:       Comments: 2+ dorsalis pedis   Neurological:      Mental Status: He is alert.    Psychiatric:         Behavior: Behavior normal.         DIAGNOSTIC RESULTS     EKG: All EKG's are interpreted by the Emergency Department Physician who either signs or Co-signsthis chart in the absence of a cardiologist.        RADIOLOGY:   Angelo rasmussen as Dora Hodges (electronically signed)          Pura Coronado, APRN  09/11/20 0669

## 2020-09-11 NOTE — TELEPHONE ENCOUNTER
Patient called to report he has some swelling in his right foot and he cannot put on his regular shoe. He stated  has some discoloration in his toes and it started a few days ago. Asked patient some more questions about his foot. Patient stated temperature on his foot feels normal, it is not hot or cold. He stated his 2nd and 3rd toe are purple. He stated he discussed it with his cancer NP wanting to make sure it was not due to his chemo and she recommended he call his cardiologist.  Patient stated he sees Dr. Kevon Rayo and BEVERLY Espinoza, JEFF. Recommended patient to to ER for evaluation since this is a new finding that just started a few days ago and he has discoloration in foot that includes purple toes.

## 2020-10-20 ENCOUNTER — HOSPITAL ENCOUNTER (OUTPATIENT)
Dept: CT IMAGING | Age: 65
Discharge: HOME | End: 2020-10-20
Payer: MEDICARE

## 2020-10-20 PROCEDURE — 82565 ASSAY OF CREATININE: CPT

## 2020-10-20 PROCEDURE — 6360000004 HC RX CONTRAST MEDICATION: Performed by: INTERNAL MEDICINE

## 2020-10-20 PROCEDURE — 74178 CT ABD&PLV WO CNTR FLWD CNTR: CPT

## 2020-10-20 RX ADMIN — IOPAMIDOL 90 ML: 755 INJECTION, SOLUTION INTRAVENOUS at 09:38

## 2020-11-03 ENCOUNTER — TELEPHONE (OUTPATIENT)
Dept: CARDIOLOGY | Age: 65
End: 2020-11-03

## 2020-11-06 ENCOUNTER — TELEPHONE (OUTPATIENT)
Dept: CARDIOLOGY | Age: 65
End: 2020-11-06

## 2020-11-06 PROCEDURE — 93294 REM INTERROG EVL PM/LDLS PM: CPT | Performed by: CLINICAL NURSE SPECIALIST

## 2020-11-06 PROCEDURE — 93296 REM INTERROG EVL PM/IDS: CPT | Performed by: CLINICAL NURSE SPECIALIST

## 2020-11-06 NOTE — PROGRESS NOTES
Left message for patient to call to review carelink pacemaker results. He needs to schedule in office visit with threshold testing.   He device does not do auto thresholds

## 2020-11-09 RX ORDER — ATORVASTATIN CALCIUM 40 MG/1
TABLET, FILM COATED ORAL
Qty: 30 TABLET | Refills: 5 | OUTPATIENT
Start: 2020-11-09

## 2020-11-12 RX ORDER — ATORVASTATIN CALCIUM 40 MG/1
TABLET, FILM COATED ORAL
Qty: 30 TABLET | Refills: 5 | OUTPATIENT
Start: 2020-11-12

## 2020-11-17 ENCOUNTER — OFFICE VISIT (OUTPATIENT)
Dept: CARDIOLOGY | Age: 65
End: 2020-11-17
Payer: MEDICARE

## 2020-11-17 VITALS
HEIGHT: 65 IN | HEART RATE: 65 BPM | DIASTOLIC BLOOD PRESSURE: 82 MMHG | SYSTOLIC BLOOD PRESSURE: 122 MMHG | WEIGHT: 161 LBS | BODY MASS INDEX: 26.82 KG/M2

## 2020-11-17 PROCEDURE — G8484 FLU IMMUNIZE NO ADMIN: HCPCS | Performed by: CLINICAL NURSE SPECIALIST

## 2020-11-17 PROCEDURE — 4004F PT TOBACCO SCREEN RCVD TLK: CPT | Performed by: CLINICAL NURSE SPECIALIST

## 2020-11-17 PROCEDURE — 4040F PNEUMOC VAC/ADMIN/RCVD: CPT | Performed by: CLINICAL NURSE SPECIALIST

## 2020-11-17 PROCEDURE — 3017F COLORECTAL CA SCREEN DOC REV: CPT | Performed by: CLINICAL NURSE SPECIALIST

## 2020-11-17 PROCEDURE — 99214 OFFICE O/P EST MOD 30 MIN: CPT | Performed by: CLINICAL NURSE SPECIALIST

## 2020-11-17 PROCEDURE — G8427 DOCREV CUR MEDS BY ELIG CLIN: HCPCS | Performed by: CLINICAL NURSE SPECIALIST

## 2020-11-17 PROCEDURE — 93280 PM DEVICE PROGR EVAL DUAL: CPT | Performed by: CLINICAL NURSE SPECIALIST

## 2020-11-17 PROCEDURE — G8417 CALC BMI ABV UP PARAM F/U: HCPCS | Performed by: CLINICAL NURSE SPECIALIST

## 2020-11-17 PROCEDURE — 1123F ACP DISCUSS/DSCN MKR DOCD: CPT | Performed by: CLINICAL NURSE SPECIALIST

## 2020-11-17 RX ORDER — CARVEDILOL 25 MG/1
25 TABLET ORAL 2 TIMES DAILY WITH MEALS
COMMUNITY

## 2020-11-17 RX ORDER — SPIRONOLACTONE 50 MG/1
50 TABLET, FILM COATED ORAL DAILY
COMMUNITY
End: 2021-01-29 | Stop reason: ALTCHOICE

## 2020-11-17 RX ORDER — ATORVASTATIN CALCIUM 40 MG/1
TABLET, FILM COATED ORAL
Qty: 30 TABLET | Refills: 5 | Status: SHIPPED | OUTPATIENT
Start: 2020-11-17 | End: 2021-05-10

## 2020-11-17 NOTE — PROGRESS NOTES
[facility] Cardiology  [address] [place] [address] [address], [address]  [address]  Phone: [phone]  Fax: [phone]    OFFICE VISIT:  2020    Mercy Ramirez - : 1955    Reason For Visit:  Aidan Simmons is a 72 y.o. male who is here for 1 Year Follow Up; Coronary Artery Disease; and Irregular Heart Beat (Sinoatrial node dysfunction -- Pacemaker )  History of coronary disease with MI in . Heart catheterization 2/15/2018 showed 90% proximal LAD treated with a drug-eluting stent and diagonal treated with drug-eluting stent. EF 45%  Known SA node dysfunction with pacemaker placed in   Patient was diagnosed with liver cancer and history of alcoholic liver cirrhosis  He underwent direct tumor radiation and chemo  He is on oral chemo and doing well. Has follow up in Dec.  He has lost some weight and is concerned that his pacemaker is sticking up. Subjective  Aidan Simmons denies exertional chest pain. Has some mild MARKHAM that is stable. No resting, shortness of breath, orthopnea, paroxysmal nocturnal dyspnea, syncope, presyncope, arrhythmia, edema and fatigue. The patient denies numbness or weakness to suggest cerebrovascular accident or transient ischemic attack. Isabel Jacobo DO is PCP and follows labs.   Mercy Ramirez has the following history as recorded in [facility]:    Patient Active Problem List    Diagnosis Date Noted    H/O heart artery stent 04/15/2019     Priority: High    Chest pain 2018     Priority: High    Mixed hyperlipidemia 2018    Cigarette nicotine dependence in remission 2018    CAD (coronary artery disease) 02/15/2018    Sinoatrial node dysfunction ([address] 02/15/2018    Tobacco abuse     Pacemaker 2013    Osteoarthritis     Carotid artery stenosis      Past Medical History:   Diagnosis Date    Bradycardia     3/5/13  loop recorder    Cancer ([address]     Carotid artery stenosis     Chronic kidney disease     Cigarette nicotine dependence in remission 2018    Hypoglycemia     Mixed hyperlipidemia 2/21/2018    Near syncope     Osteoarthritis     Pacemaker 05/22/2013    loop recorder removed    S/P carotid endarterectomy     right internal carotid    Sinoatrial node dysfunction ([address] 2/15/2018    3/5/13  loop recorder 5/22/2013  Dual chamber PPM    Tobacco abuse      Past Surgical History:   Procedure Laterality Date    CORONARY ANGIOPLASTY WITH STENT PLACEMENT  02/15/2018    SP prox LAD and Osteal diagonal    CORONARY ANGIOPLASTY WITH STENT PLACEMENT  02/26/2018    SP to osteal    PACEMAKER INSERTION  5/22/2013    VASCULAR SURGERY  2-27-13 [facility]    Right Carotid endarterectomy, eversion technique with completion duplex US     Family History   Problem Relation Age of Onset    Diabetes Mother     Heart Disease Father     High Blood Pressure Father     Diabetes Father     Stroke Father      Social History     Tobacco Use    Smoking status: Current Every Day Smoker     Packs/day: 0.25     Types: Cigarettes    Smokeless tobacco: Former User     Types: Chew   Substance Use Topics    Alcohol use: No     Alcohol/week: 0.0 standard drinks      Current Outpatient Medications   Medication Sig Dispense Refill    spironolactone (ALDACTONE) 50 MG tablet Take 50 mg by mouth daily      carvedilol (COREG) 6.25 MG tablet Take 6.25 mg by mouth 2 times daily (with meals)      atorvastatin (LIPITOR) 40 MG tablet TAKE ONE TABLET BY MOUTH NIGHTLY 30 tablet 5    ZINC PO Take by mouth      NONFORMULARY 1 tablet 2 times daily nexvar      aspirin 81 MG chewable tablet Take 1 tablet by mouth daily 30 tablet 3    Multiple Vitamins-Minerals (THERAPEUTIC MULTIVITAMIN-MINERALS) tablet Take 1 tablet by mouth daily       No current facility-administered medications for this visit. Allergies: Codeine    Review of Systems  Constitutional - no significant activity change, appetite change, or unexpected weight change. No fever, chills or diaphoresis. No fatigue.    HEENT - no significant rhinorrhea or epistaxis. No tinnitus or significant hearing loss. Eyes - no sudden vision change or amaurosis. Respiratory - no significant wheezing, stridor, apnea or cough. No dyspnea on exertion or shortness of breath. Cardiovascular - no exertional chest pain, orthopnea or PND. No sensation of arrhythmia or slow heart rate. No claudication or leg edema. Gastrointestinal - no abdominal swelling or pain. No blood in stool. No severe constipation, diarrhea, nausea, or vomiting. Genitourinary - no difficulty urinating, dysuria, frequency, or urgency. No flank pain or hematuria. Musculoskeletal - no back pain, gait disturbance, or myalgia. Skin - no color change or rash. No pallor. No new surgical incision. Neurologic - no speech difficulty, facial asymmetry or lateralizing weakness. No seizures, presyncope, syncope, or significant dizziness. Hematologic - no easy bruising or excessive bleeding. Psychiatric - no severe anxiety or insomnia. No confusion. All other review of systems are negative. Objective  Vital Signs - /82   Pulse 65   Ht 5' 5\" (1.651 m)   Wt 161 lb (73 kg)   BMI 26.79 kg/m²   General - Geanie Old is alert, cooperative, and pleasant. Well groomed. No acute distress. Body habitus is normal.  HEENT - The head is normocephalic. No circumoral cyanosis. Dentition is normal.   EYES -  No Xanthelasma, no arcus senilis, no conjunctival hemorrhages or discharge. Neck - Supple, without increased jugular venous pressures. No carotid bruits. No mass. Respiratory - Lungs are clear bilaterally. No wheezes or rales. Normal effort without use of accessory muscles. Cardiovascular - Heart has regular rhythm and rate. No murmurs, rubs or gallops. + pedal pulses and no varicosities. Abdominal -  Soft, nontender, nondistended. Bowel sounds are intact. Extremities - No clubbing, cyanosis, or  edema. Musculoskeletal -  No clubbing .   No Osler's nodes.   Gait normal .  No kyphosis or scoliosis. Skin -  no statis ulcers or dermatitis. Neurological - No focal signs are identified. Oriented to person, place and time. Psychiatric -  Appropriate affect and mood. Assessment:     Diagnosis Orders   1. Coronary artery disease involving native coronary artery of native heart without angina pectoris     2. Sinoatrial node dysfunction (HCC)     3. Pacemaker     4. PSVT (paroxysmal supraventricular tachycardia) (HCC)       Data:  BP Readings from Last 3 Encounters:   11/17/20 122/82   09/11/20 (!) 192/97   09/02/20 (!) 152/52    Pulse Readings from Last 3 Encounters:   11/17/20 65   09/11/20 62   09/02/20 79        Wt Readings from Last 3 Encounters:   11/17/20 161 lb (73 kg)   09/11/20 170 lb (77.1 kg)   09/02/20 160 lb (72.6 kg)     Pacemaker check showed adequate battery status-2.95 V   and  appropriate diagnostics without any sustained arrhythmias. Mode AAIR-DDDR at 60  AS-VS 86.7%  AP- VS 12.4%  AS- <0.1%  AP-  0.9%  Device more prominent than was before due to weight loss. Still in good placement  Next check in 3 months via [employer] home monitoring system      Blood pressure and heart rate controlled. Medical management includes beta-blocker, aspirin and statin along with Aldactone  Needs refill on his statin. Received labs from PCP. No recent lipids however liver enzymes have been stable which is a concern with his recent liver cancer treatment and history of hepatitis C       States taking medications as prescribed  Stable cardiovascular status. No evidence of overt heart failure, angina or dysrhythmia. Plan  Will get labs from DR Robby Robledo home pacemaker reading on 2-17-21   Follow up in 6mos With Dr. Hernán Velasquez   Call with any questions or concerns  Follow up with Felecia Laughlin, DO for non cardiac problems  Report any new problems  Cardiovascular Fitness-Exercise as tolerated.   Strive for 30 minutes of exercise most days of the week. Cardiac / Healthy Diet  Continue current medications as directed  Continue plan of treatment  It is always recommended that you bring your medications bottles with you to each visit - this is for your safety! JEFF Multani dragon/transcription disclaimer: Much of this encounter note is electronic transcription/translation of spoken language to printed tach. Electronic translation of spoken language may be erroneous, or at times, nonsensical words or phrases may be inadvertently transcribed.  Although, I have reviewed the note for such errors, some may still exist.

## 2020-11-18 RX ORDER — MOMETASONE FUROATE 1 MG/ML
SOLUTION TOPICAL
Qty: 60 ML | Refills: 3 | OUTPATIENT
Start: 2020-11-18

## 2020-12-23 ENCOUNTER — HOSPITAL ENCOUNTER (OUTPATIENT)
Dept: CT IMAGING | Age: 65
Discharge: HOME | End: 2020-12-23
Payer: MEDICARE

## 2020-12-23 LAB
GFR AFRICAN AMERICAN: >60
GFR NON-AFRICAN AMERICAN: 51
PERFORMED ON: ABNORMAL
POC CREATININE: 1.4 MG/DL (ref 0.3–1.3)
POC SAMPLE TYPE: ABNORMAL

## 2020-12-23 PROCEDURE — 74178 CT ABD&PLV WO CNTR FLWD CNTR: CPT

## 2020-12-23 PROCEDURE — 6360000004 HC RX CONTRAST MEDICATION: Performed by: INTERNAL MEDICINE

## 2020-12-23 PROCEDURE — 82565 ASSAY OF CREATININE: CPT

## 2020-12-23 RX ADMIN — IOPAMIDOL 90 ML: 755 INJECTION, SOLUTION INTRAVENOUS at 08:06

## 2020-12-27 ENCOUNTER — HOSPITAL ENCOUNTER (EMERGENCY)
Age: 65
Discharge: HOME | End: 2020-12-27
Payer: MEDICARE

## 2020-12-27 ENCOUNTER — APPOINTMENT (OUTPATIENT)
Dept: GENERAL RADIOLOGY | Age: 65
End: 2020-12-27
Payer: MEDICARE

## 2020-12-27 VITALS
WEIGHT: 150 LBS | HEIGHT: 66 IN | TEMPERATURE: 98.5 F | SYSTOLIC BLOOD PRESSURE: 147 MMHG | BODY MASS INDEX: 24.11 KG/M2 | DIASTOLIC BLOOD PRESSURE: 93 MMHG | HEART RATE: 65 BPM | OXYGEN SATURATION: 100 % | RESPIRATION RATE: 18 BRPM

## 2020-12-27 PROCEDURE — 99999 PR OFFICE/OUTPT VISIT,PROCEDURE ONLY: CPT | Performed by: NURSE PRACTITIONER

## 2020-12-27 PROCEDURE — 73110 X-RAY EXAM OF WRIST: CPT

## 2020-12-27 PROCEDURE — 6370000000 HC RX 637 (ALT 250 FOR IP): Performed by: NURSE PRACTITIONER

## 2020-12-27 PROCEDURE — 99283 EMERGENCY DEPT VISIT LOW MDM: CPT

## 2020-12-27 RX ORDER — HYDROCODONE BITARTRATE AND ACETAMINOPHEN 5; 325 MG/1; MG/1
1 TABLET ORAL EVERY 6 HOURS PRN
Qty: 12 TABLET | Refills: 0 | Status: SHIPPED | OUTPATIENT
Start: 2020-12-27 | End: 2020-12-30

## 2020-12-27 RX ORDER — HYDROCODONE BITARTRATE AND ACETAMINOPHEN 10; 325 MG/1; MG/1
1 TABLET ORAL ONCE
Status: COMPLETED | OUTPATIENT
Start: 2020-12-27 | End: 2020-12-27

## 2020-12-27 RX ORDER — CEPHALEXIN 500 MG/1
500 CAPSULE ORAL 3 TIMES DAILY
Qty: 30 CAPSULE | Refills: 0 | Status: SHIPPED | OUTPATIENT
Start: 2020-12-27 | End: 2021-01-06

## 2020-12-27 RX ADMIN — HYDROCODONE BITARTRATE AND ACETAMINOPHEN 1 TABLET: 10; 325 TABLET ORAL at 15:40

## 2020-12-27 ASSESSMENT — PAIN SCALES - GENERAL
PAINLEVEL_OUTOF10: 10
PAINLEVEL_OUTOF10: 10

## 2020-12-27 NOTE — ED PROVIDER NOTES
[facility] EMERGENCY DEPT  eMERGENCY dEPARTMENT eNCOUnter      Pt Name: Jennifer Sutherland  MRN: [id number]  [place] 1955  Date of evaluation: 12/27/2020  Provider: Cole Valle, [address]       Chief Complaint   Patient presents with    Wrist Pain     Started this morning, unsure on what he did to hurt it         HISTORY OF PRESENT ILLNESS   (Location/Symptom, Timing/Onset,Context/Setting, Quality, Duration, Modifying Factors, Severity)  Note limiting factors. Yoly Grew a 72 y.o. male who presents to the emergency department for evaluation of wrist pain. Pt tells me that he is right hand dominant having had right wrist pain today while at [facility]. He denies fall as well as direct injury. He describes possibly twisting his wrist today while picking up his dog. He shows me a scabbed area to dorsal wrist with some local redness that developed after being scratched by his dog several days ago. He denies dog bite to me. He denies numbness as well as weakness to right hand. He tells me that his tetanus immunization is up to date. HPI    Nursing Notes were reviewed. REVIEW OF SYSTEMS    (2-9 systems for level 4, 10 or more for level 5)     Review of Systems   Constitutional: Negative. Skin: Positive for wound (dorsal right wrist). A complete review of systems was performed and is negative except as noted above in the HPI.        PAST MEDICAL HISTORY     Past Medical History:   Diagnosis Date    Bradycardia     3/5/13  loop recorder    Cancer ([facility] [address]     Carotid artery stenosis     Chronic kidney disease     Cigarette nicotine dependence in remission 2/21/2018    Hypoglycemia     Mixed hyperlipidemia 2/21/2018    Near syncope     Osteoarthritis     Pacemaker 05/22/2013    loop recorder removed    S/P carotid endarterectomy     right internal carotid    Sinoatrial node dysfunction ([facility] [address] 2/15/2018    3/5/13  loop recorder 5/22/2013  Dual chamber PPM    Tobacco abuse          SURGICAL HISTORY Past Surgical History:   Procedure Laterality Date    CORONARY ANGIOPLASTY WITH STENT PLACEMENT  02/15/2018    SP prox LAD and Osteal diagonal    CORONARY ANGIOPLASTY WITH STENT PLACEMENT  02/26/2018    SP to osteal    PACEMAKER INSERTION  5/22/2013    VASCULAR SURGERY  2-27-13 [facility]    Right Carotid endarterectomy, eversion technique with completion duplex US         CURRENT MEDICATIONS       Discharge Medication List as of 12/27/2020  4:16 PM      CONTINUE these medications which have NOT CHANGED    Details   spironolactone (ALDACTONE) 50 MG tablet Take 50 mg by mouth dailyHistorical Med      carvedilol (COREG) 6.25 MG tablet Take 6.25 mg by mouth 2 times daily (with meals)Historical Med      atorvastatin (LIPITOR) 40 MG tablet TAKE ONE TABLET BY MOUTH NIGHTLY, Disp-30 tablet,R-5Normal      ZINC PO Take by mouthHistorical Med      Multiple Vitamins-Minerals (THERAPEUTIC MULTIVITAMIN-MINERALS) tablet Take 1 tablet by mouth dailyHistorical Med      aspirin 81 MG chewable tablet Take 1 tablet by mouth daily, Disp-30 tablet, R-3Normal      NONFORMULARY 1 tablet 2 times daily nexvarHistorical Med             ALLERGIES     Codeine    FAMILY HISTORY       Family History   Problem Relation Age of Onset    Diabetes Mother     Heart Disease Father     High Blood Pressure Father     Diabetes Father     Stroke Father           SOCIAL HISTORY       Social History     Socioeconomic History    Marital status:      Spouse name: None    Number of children: None    Years of education: None    Highest education level: None   Occupational History    None   Social Needs    Financial resource strain: None    Food insecurity     Worry: None     Inability: None    Transportation needs     Medical: None     Non-medical: None   Tobacco Use    Smoking status: Current Every Day Smoker     Packs/day: 0.25     Types: Cigarettes    Smokeless tobacco: Former User     Types: Chew   Substance and Sexual Activity    Alcohol use: No     Alcohol/week: 0.0 standard drinks    Drug use: No     Comment: Past Use    Sexual activity: Never     Partners: Female   Lifestyle    Physical activity     Days per week: None     Minutes per session: None    Stress: None   Relationships    Social connections     Talks on phone: None     Gets together: None     Attends Holiness service: None     Active member of club or organization: None     Attends meetings of clubs or organizations: None     Relationship status: None    Intimate partner violence     Fear of current or ex partner: None     Emotionally abused: None     Physically abused: None     Forced sexual activity: None   Other Topics Concern    None   Social History Narrative    None       SCREENINGS             PHYSICAL EXAM    (up to 7 for level 4, 8 or more for level 5)     ED Triage Vitals [12/27/20 1435]   BP Temp Temp src Pulse Resp SpO2 Height Weight   (!) 147/93 98.5 °F (36.9 °C) -- 65 18 100 % 5' 6\" (1.676 m) 150 lb (68 kg)       Physical Exam  Cardiovascular:      Rate and Rhythm: Normal rate. Pulmonary:      Effort: Pulmonary effort is normal.   Musculoskeletal: Normal range of motion. Comments: Painful active flexion/extension of right wrist  Dorsal mid wrist with scabbed area with local erythema and induration, no fluctuance  CMS intact right hand   Compartments of right forearm are soft   Skin:     General: Skin is warm and dry. Neurological:      Mental Status: He is alert and oriented to person, place, and time.          DIAGNOSTIC RESULTS     EKG: All EKG's are interpreted by the Emergency Department Physician who either signs or Co-signs this chart in the absence of acardiologist.        RADIOLOGY:   Non-plain film images such as CT, Ultrasound andMRI are read by the radiologist. 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 No acute   fracture. Signed by Dr Arcadio Silveira. Jae on 12/27/2020 3:53 PM            ED BEDSIDE ULTRASOUND:   Performed by ED Physician - none    LABS:  Labs Reviewed - No data to display    All other labs were within normal range or not returned as of this dictation. RE-ASSESSMENT           EMERGENCY DEPARTMENT COURSE and DIFFERENTIALDIAGNOSIS/MDM:   Vitals:    Vitals:    12/27/20 1435   BP: (!) 147/93   Pulse: 65   Resp: 18   Temp: 98.5 °F (36.9 °C)   SpO2: 100%   Weight: 150 lb (68 kg)   Height: 5' 6\" (1.676 m)       MDM      CONSULTS:  None    PROCEDURES:  Unless otherwise notedbelow, none     Procedures    FINAL IMPRESSION     1. Right wrist pain    2. Arthritis, wrist    3. Cellulitis of right wrist          DISPOSITION/PLAN   DISPOSITION Decision To Discharge 12/27/2020 04:26:23 PM      PATIENT REFERRED TO:  [facility]  [phone]    Schedule an appointment as soon as possible for a visit   For wound re-check      DISCHARGE MEDICATIONS:    Attestation: The Prescription Monitoring Report for this patient was reviewed today. ([id number]) JEFF Harrington)  Periodic Controlled Substance Monitoring: Possible medication side effects, risk of tolerance/dependence & alternative treatments discussed., No signs of potential drug abuse or diversion identified.  JEFF Harrington)  Discharge Medication List as of 12/27/2020  4:16 PM           Medication List      START taking these medications    cephALEXin 500 MG capsule  Commonly known as: Keflex  Take 1 capsule by mouth 3 times daily for 10 days     HYDROcodone-acetaminophen 5-325 MG per tablet  Commonly known as: Norco  Take 1 tablet by mouth every 6 hours as needed for Pain for up to 3 days. Intended supply: 3 days.  Take lowest dose possible to manage pain        ASK your doctor about these medications    aspirin 81 MG chewable tablet  Take 1 tablet by mouth daily     atorvastatin 40 MG tablet  Commonly known as: LIPITOR  TAKE ONE TABLET BY MOUTH NIGHTLY     carvedilol 6.25 MG tablet  Commonly known as: COREG     NONFORMULARY     spironolactone 50 MG tablet  Commonly known as: ALDACTONE     therapeutic multivitamin-minerals tablet     ZINC PO           Where to Get Your Medications      You can get these medications from any pharmacy    Bring a paper prescription for each of these medications  · cephALEXin 500 MG capsule  · HYDROcodone-acetaminophen 5-325 MG per tablet         (Pleasenote that portions of this note were completed with a voice recognition program.  Efforts were made to edit the dictations but occasionally words are mis-transcribed.)              Ronda Prakash, APRN  12/27/20 5410

## 2021-01-22 ENCOUNTER — HOSPITAL ENCOUNTER (OUTPATIENT)
Dept: ULTRASOUND IMAGING | Age: 66
Discharge: HOME | End: 2021-01-22
Payer: MEDICARE

## 2021-01-22 DIAGNOSIS — N17.9 ACUTE RENAL FAILURE, UNSPECIFIED ACUTE RENAL FAILURE TYPE (HCC): ICD-10-CM

## 2021-01-22 PROCEDURE — 76770 US EXAM ABDO BACK WALL COMP: CPT

## 2021-01-29 ENCOUNTER — HOSPITAL ENCOUNTER (EMERGENCY)
Age: 66
Discharge: HOME | End: 2021-01-29
Attending: EMERGENCY MEDICINE
Payer: MEDICARE

## 2021-01-29 VITALS
RESPIRATION RATE: 15 BRPM | BODY MASS INDEX: 30.63 KG/M2 | HEART RATE: 65 BPM | WEIGHT: 156 LBS | HEIGHT: 60 IN | OXYGEN SATURATION: 100 % | SYSTOLIC BLOOD PRESSURE: 180 MMHG | TEMPERATURE: 97.7 F | DIASTOLIC BLOOD PRESSURE: 84 MMHG

## 2021-01-29 DIAGNOSIS — E87.5 HYPERKALEMIA: ICD-10-CM

## 2021-01-29 DIAGNOSIS — R79.89 ELEVATED SERUM CREATININE: Primary | ICD-10-CM

## 2021-01-29 LAB
ALBUMIN SERPL-MCNC: 3.8 G/DL (ref 3.5–5.2)
ALP BLD-CCNC: 48 U/L (ref 40–130)
ALT SERPL-CCNC: 10 U/L (ref 5–41)
ANION GAP SERPL CALCULATED.3IONS-SCNC: 10 MMOL/L (ref 7–19)
ANION GAP SERPL CALCULATED.3IONS-SCNC: 9 MMOL/L (ref 7–19)
AST SERPL-CCNC: 22 U/L (ref 5–40)
BACTERIA: NEGATIVE /HPF
BASOPHILS ABSOLUTE: 0 K/UL (ref 0–0.2)
BASOPHILS RELATIVE PERCENT: 0.3 % (ref 0–1)
BILIRUB SERPL-MCNC: 0.4 MG/DL (ref 0.2–1.2)
BILIRUBIN URINE: NEGATIVE
BLOOD, URINE: NEGATIVE
BUN BLDV-MCNC: 30 MG/DL (ref 8–23)
BUN BLDV-MCNC: 32 MG/DL (ref 8–23)
CALCIUM SERPL-MCNC: 9.3 MG/DL (ref 8.8–10.2)
CALCIUM SERPL-MCNC: 9.4 MG/DL (ref 8.8–10.2)
CHLORIDE BLD-SCNC: 103 MMOL/L (ref 98–111)
CHLORIDE BLD-SCNC: 104 MMOL/L (ref 98–111)
CLARITY: CLEAR
CO2: 24 MMOL/L (ref 22–29)
CO2: 24 MMOL/L (ref 22–29)
COLOR: YELLOW
CREAT SERPL-MCNC: 1.7 MG/DL (ref 0.5–1.2)
CREAT SERPL-MCNC: 1.8 MG/DL (ref 0.5–1.2)
CRYSTALS, UA: ABNORMAL /HPF
EOSINOPHILS ABSOLUTE: 0.2 K/UL (ref 0–0.6)
EOSINOPHILS RELATIVE PERCENT: 3.8 % (ref 0–5)
EPITHELIAL CELLS, UA: 0 /HPF (ref 0–5)
GFR AFRICAN AMERICAN: 46
GFR AFRICAN AMERICAN: 49
GFR NON-AFRICAN AMERICAN: 38
GFR NON-AFRICAN AMERICAN: 41
GLUCOSE BLD-MCNC: 106 MG/DL (ref 74–109)
GLUCOSE BLD-MCNC: 94 MG/DL (ref 74–109)
GLUCOSE URINE: NEGATIVE MG/DL
HCT VFR BLD CALC: 37.4 % (ref 42–52)
HEMOGLOBIN: 12.4 G/DL (ref 14–18)
HYALINE CASTS: 0 /HPF (ref 0–8)
IMMATURE GRANULOCYTES #: 0 K/UL
KETONES, URINE: NEGATIVE MG/DL
LEUKOCYTE ESTERASE, URINE: ABNORMAL
LIPASE: 34 U/L (ref 13–60)
LYMPHOCYTES ABSOLUTE: 0.8 K/UL (ref 1.1–4.5)
LYMPHOCYTES RELATIVE PERCENT: 13 % (ref 20–40)
MCH RBC QN AUTO: 30.8 PG (ref 27–31)
MCHC RBC AUTO-ENTMCNC: 33.2 G/DL (ref 33–37)
MCV RBC AUTO: 92.8 FL (ref 80–94)
MONOCYTES ABSOLUTE: 0.5 K/UL (ref 0–0.9)
MONOCYTES RELATIVE PERCENT: 8.9 % (ref 0–10)
NEUTROPHILS ABSOLUTE: 4.5 K/UL (ref 1.5–7.5)
NEUTROPHILS RELATIVE PERCENT: 73.7 % (ref 50–65)
NITRITE, URINE: NEGATIVE
PDW BLD-RTO: 13.9 % (ref 11.5–14.5)
PH UA: 5.5 (ref 5–8)
PLATELET # BLD: 56 K/UL (ref 130–400)
PMV BLD AUTO: 11.1 FL (ref 9.4–12.4)
POTASSIUM REFLEX MAGNESIUM: 5.4 MMOL/L (ref 3.5–5)
POTASSIUM SERPL-SCNC: 5.6 MMOL/L (ref 3.5–5)
PROTEIN UA: 300 MG/DL
RBC # BLD: 4.03 M/UL (ref 4.7–6.1)
RBC UA: 5 /HPF (ref 0–4)
SODIUM BLD-SCNC: 136 MMOL/L (ref 136–145)
SODIUM BLD-SCNC: 138 MMOL/L (ref 136–145)
SPECIFIC GRAVITY UA: 1.01 (ref 1–1.03)
TOTAL PROTEIN: 6.9 G/DL (ref 6.6–8.7)
UROBILINOGEN, URINE: 1 E.U./DL
WBC # BLD: 6.1 K/UL (ref 4.8–10.8)
WBC UA: 1 /HPF (ref 0–5)

## 2021-01-29 PROCEDURE — 93005 ELECTROCARDIOGRAM TRACING: CPT | Performed by: PHYSICIAN ASSISTANT

## 2021-01-29 PROCEDURE — 81001 URINALYSIS AUTO W/SCOPE: CPT

## 2021-01-29 PROCEDURE — 99284 EMERGENCY DEPT VISIT MOD MDM: CPT

## 2021-01-29 PROCEDURE — 80053 COMPREHEN METABOLIC PANEL: CPT

## 2021-01-29 PROCEDURE — 6370000000 HC RX 637 (ALT 250 FOR IP): Performed by: PHYSICIAN ASSISTANT

## 2021-01-29 PROCEDURE — 83690 ASSAY OF LIPASE: CPT

## 2021-01-29 PROCEDURE — 36415 COLL VENOUS BLD VENIPUNCTURE: CPT

## 2021-01-29 PROCEDURE — 2580000003 HC RX 258: Performed by: PHYSICIAN ASSISTANT

## 2021-01-29 PROCEDURE — 85025 COMPLETE CBC W/AUTO DIFF WBC: CPT

## 2021-01-29 PROCEDURE — 96360 HYDRATION IV INFUSION INIT: CPT

## 2021-01-29 RX ORDER — 0.9 % SODIUM CHLORIDE 0.9 %
1000 INTRAVENOUS SOLUTION INTRAVENOUS ONCE
Status: COMPLETED | OUTPATIENT
Start: 2021-01-29 | End: 2021-01-29

## 2021-01-29 RX ORDER — SODIUM POLYSTYRENE SULFONATE 15 G/60ML
15 SUSPENSION ORAL; RECTAL ONCE
Status: COMPLETED | OUTPATIENT
Start: 2021-01-29 | End: 2021-01-29

## 2021-01-29 RX ORDER — SPIRONOLACTONE 50 MG/1
50 TABLET, FILM COATED ORAL DAILY
COMMUNITY
End: 2022-09-29

## 2021-01-29 RX ADMIN — SODIUM CHLORIDE 1000 ML: 9 INJECTION, SOLUTION INTRAVENOUS at 18:03

## 2021-01-29 RX ADMIN — SODIUM POLYSTYRENE SULFONATE 15 G: 15 SUSPENSION ORAL; RECTAL at 19:29

## 2021-01-29 ASSESSMENT — ENCOUNTER SYMPTOMS
SHORTNESS OF BREATH: 0
COLOR CHANGE: 0
PHOTOPHOBIA: 0
BACK PAIN: 0
COUGH: 0
APNEA: 0
EYE DISCHARGE: 0
EYE ITCHING: 0

## 2021-01-29 NOTE — ED PROVIDER NOTES
[facility] EMERGENCY DEPT  eMERGENCYdEPARTMENT eNCOUnter      Pt Name: See Milian  MRN: [id number]  [place] 1955  Date of evaluation: 1/29/2021  Provider:KELECHI Solano    CHIEF COMPLAINT       Chief Complaint   Patient presents with    Abnormal Lab     elevated k+         HISTORY OF PRESENT ILLNESS  (Location/Symptom, Timing/Onset, Context/Setting, Quality, Duration, Modifying Factors, Severity.)   See Milian is a 72 y.o. male who presents to the emergency department with complaints of elevated potassium in outpatient setting takes diuretics and spironolactone. Compliant followed by Dr Gloria Pinedo has known liver cancer takes 200mg chemo tablet daily. Followed by onc in [place] fatigue is only complaint denies fever abdominal pain denies resp symptoms. No loss of taste or smell no myalgias. Denies abdominal pain Making stool no urine changes normal intake. \"was told to comer here for my high potassium. \" Has a referral to nephrology. HPI    Nursing Notes were reviewed and I agree. REVIEW OF SYSTEMS    (2-9 systems for level 4, 10 or more for level 5)     Review of Systems   Constitutional: Positive for fatigue. Negative for activity change, appetite change, chills and fever. HENT: Negative for congestion and dental problem. Eyes: Negative for photophobia, discharge and itching. Respiratory: Negative for apnea, cough and shortness of breath. Cardiovascular: Negative for chest pain. Musculoskeletal: Negative for arthralgias, back pain, gait problem, myalgias and neck pain. Skin: Negative for color change, pallor and rash. Neurological: Negative for dizziness, seizures and syncope. Psychiatric/Behavioral: Negative for agitation. The patient is not nervous/anxious. Except as noted above the remainder of the review of systems was reviewed and negative.        PAST MEDICAL HISTORY     Past Medical History:   Diagnosis Date    Bradycardia     3/5/13  loop recorder    Cancer ([facility] [address]     Carotid artery stenosis     Chronic kidney disease     Cigarette nicotine dependence in remission 2/21/2018    Hypoglycemia     Mixed hyperlipidemia 2/21/2018    Near syncope     Osteoarthritis     Pacemaker 05/22/2013    loop recorder removed    Recovering alcoholic ([facility] [address]     S/P carotid endarterectomy     right internal carotid    Sinoatrial node dysfunction ([facility] [address] 2/15/2018    3/5/13  loop recorder 5/22/2013  Dual chamber PPM    Tobacco abuse          SURGICAL HISTORY       Past Surgical History:   Procedure Laterality Date    CORONARY ANGIOPLASTY WITH STENT PLACEMENT  02/15/2018    SP prox LAD and Osteal diagonal    CORONARY ANGIOPLASTY WITH STENT PLACEMENT  02/26/2018    SP to osteal    PACEMAKER INSERTION  5/22/2013    VASCULAR SURGERY  2-27-13 [facility]    Right Carotid endarterectomy, eversion technique with completion duplex US         CURRENT MEDICATIONS       Discharge Medication List as of 1/29/2021  7:25 PM      CONTINUE these medications which have NOT CHANGED    Details   spironolactone (ALDACTONE) 50 MG tablet Take 50 mg by mouth dailyHistorical Med      carvedilol (COREG) 6.25 MG tablet Take 25 mg by mouth 2 times daily (with meals) Historical Med      atorvastatin (LIPITOR) 40 MG tablet TAKE ONE TABLET BY MOUTH NIGHTLY, Disp-30 tablet,R-5Normal      ZINC PO Take by mouthHistorical Med      Multiple Vitamins-Minerals (THERAPEUTIC MULTIVITAMIN-MINERALS) tablet Take 1 tablet by mouth dailyHistorical Med      aspirin 81 MG chewable tablet Take 1 tablet by mouth daily, Disp-30 tablet, R-3Normal      NONFORMULARY 1 tablet 2 times daily nexvarHistorical Med             ALLERGIES     Codeine    FAMILY HISTORY       Family History   Problem Relation Age of Onset    Diabetes Mother     Heart Disease Father     High Blood Pressure Father     Diabetes Father     Stroke Father           SOCIAL HISTORY       Social History     Socioeconomic History    Marital status:      Spouse name: None    Number of children: None    Years of education: None    Highest education level: None   Occupational History    None   Social Needs    Financial resource strain: None    Food insecurity     Worry: None     Inability: None    Transportation needs     Medical: None     Non-medical: None   Tobacco Use    Smoking status: Current Every Day Smoker     Packs/day: 0.25     Types: Cigarettes    Smokeless tobacco: Former User     Types: Chew   Substance and Sexual Activity    Alcohol use: No     Alcohol/week: 0.0 standard drinks    Drug use: No     Comment: Past Use    Sexual activity: Never     Partners: Female   Lifestyle    Physical activity     Days per week: None     Minutes per session: None    Stress: None   Relationships    Social connections     Talks on phone: None     Gets together: None     Attends Restorationism service: None     Active member of club or organization: None     Attends meetings of clubs or organizations: None     Relationship status: None    Intimate partner violence     Fear of current or ex partner: None     Emotionally abused: None     Physically abused: None     Forced sexual activity: None   Other Topics Concern    None   Social History Narrative    None       SCREENINGS    Ema Coma Scale  Eye Opening: Spontaneous  Best Verbal Response: Oriented  Best Motor Response: Obeys commands  [place] Coma Scale Score: 15      PHYSICAL EXAM    (up to 7 forlevel 4, 8 or more for level 5)     ED Triage Vitals [01/29/21 1527]   BP Temp Temp Source Pulse Resp SpO2 Height Weight   (!) 166/90 97.7 °F (36.5 °C) Oral 66 20 100 % 5' (1.524 m) 156 lb (70.8 kg)       Physical Exam  Constitutional:       General: He is not in acute distress. Appearance: He is well-developed. He is not diaphoretic. HENT:      Head: Normocephalic and atraumatic. Right Ear: External ear normal.      Left Ear: External ear normal.   Eyes:      Pupils: Pupils are equal, round, and reactive to light. Neck:      Musculoskeletal: Normal range of motion and neck supple. Trachea: No tracheal deviation. Cardiovascular:      Rate and Rhythm: Normal rate and regular rhythm. Heart sounds: Normal heart sounds. No murmur. Pulmonary:      Effort: Pulmonary effort is normal.      Breath sounds: Normal breath sounds. No stridor. No wheezing. Chest:      Chest wall: No tenderness. Abdominal:      General: Bowel sounds are normal. There is no distension. Palpations: Abdomen is soft. Tenderness: There is no abdominal tenderness. Musculoskeletal: Normal range of motion. Skin:     General: Skin is warm and dry. Capillary Refill: Capillary refill takes less than 2 seconds. Neurological:      General: No focal deficit present. Mental Status: He is alert and oriented to person, place, and time. Mental status is at baseline. Psychiatric:         Mood and Affect: Mood normal.         Behavior: Behavior normal.         Thought Content:  Thought content normal.         Judgment: Judgment normal.           DIAGNOSTIC RESULTS     RADIOLOGY:   Non-plain film images such as CT, Ultrasound and MRI are read by the radiologist. Plain radiographic images are visualized and preliminarilyinterpreted by No att. providers found with the below findings:      Interpretation per the Radiologist below, if available at the time of this note:    No orders to display       LABS:  Labs Reviewed   URINE RT REFLEX TO CULTURE - Abnormal; Notable for the following components:       Result Value    Leukocyte Esterase, Urine TRACE (*)     All other components within normal limits   CBC WITH AUTO DIFFERENTIAL - Abnormal; Notable for the following components:    RBC 4.03 (*)     Hemoglobin 12.4 (*)     Hematocrit 37.4 (*)     Platelets 56 (*)     Neutrophils % 73.7 (*)     Lymphocytes % 13.0 (*)     Lymphocytes Absolute 0.8 (*)     All other components within normal limits   COMPREHENSIVE METABOLIC PANEL W/ REFLEX TO this note were completed with a voice recognition program.  Efforts were made to edit the dictations but occasionallywords are mis-transcribed.)    Octavio Booth [address], [place]  01/29/21 7019

## 2021-01-29 NOTE — ED NOTES
Patient placed in a gown Patient placed on cardiac monitor, continuous pulse oximeter, and NIBP monitor.  Monitor alarms on.       Xiomara Leung RN  01/29/21 [id number]

## 2021-01-30 NOTE — ED PROVIDER NOTES
See PA note. Reviewed HPI, PE, labs. PA in touch with Nephrology. Agree with A/P.      Nick Denton MD  01/30/21 7023

## 2021-02-01 LAB
EKG P AXIS: NORMAL DEGREES
EKG P-R INTERVAL: NORMAL MS
EKG Q-T INTERVAL: 388 MS
EKG QRS DURATION: 88 MS
EKG QTC CALCULATION (BAZETT): 389 MS
EKG T AXIS: 50 DEGREES

## 2021-02-01 PROCEDURE — 93010 ELECTROCARDIOGRAM REPORT: CPT | Performed by: INTERNAL MEDICINE

## 2021-04-01 ENCOUNTER — TELEPHONE (OUTPATIENT)
Dept: CARDIOLOGY CLINIC | Age: 66
End: 2021-04-01

## 2021-04-01 DIAGNOSIS — Z95.0 PACEMAKER: Primary | ICD-10-CM

## 2021-04-01 DIAGNOSIS — I49.5 SINOATRIAL NODE DYSFUNCTION (HCC): ICD-10-CM

## 2021-04-01 PROCEDURE — 93296 REM INTERROG EVL PM/IDS: CPT | Performed by: CLINICAL NURSE SPECIALIST

## 2021-04-02 PROCEDURE — 93294 REM INTERROG EVL PM/LDLS PM: CPT | Performed by: CLINICAL NURSE SPECIALIST

## 2021-04-19 ENCOUNTER — HOSPITAL ENCOUNTER (OUTPATIENT)
Dept: CT IMAGING | Age: 66
Discharge: HOME | End: 2021-04-19
Payer: MEDICARE

## 2021-04-19 DIAGNOSIS — C22.0 HEPATIC CARCINOMA (HCC): ICD-10-CM

## 2021-04-19 LAB
GFR AFRICAN AMERICAN: 49
GFR NON-AFRICAN AMERICAN: 41
PERFORMED ON: ABNORMAL
POC CREATININE: 1.7 MG/DL (ref 0.3–1.3)
POC SAMPLE TYPE: ABNORMAL

## 2021-04-19 PROCEDURE — 74178 CT ABD&PLV WO CNTR FLWD CNTR: CPT

## 2021-04-19 PROCEDURE — 82565 ASSAY OF CREATININE: CPT

## 2021-04-19 PROCEDURE — 6360000004 HC RX CONTRAST MEDICATION: Performed by: INTERNAL MEDICINE

## 2021-04-19 RX ADMIN — IOPAMIDOL 90 ML: 755 INJECTION, SOLUTION INTRAVENOUS at 08:54

## 2021-05-10 RX ORDER — ATORVASTATIN CALCIUM 40 MG/1
TABLET, FILM COATED ORAL
Qty: 30 TABLET | Refills: 5 | Status: SHIPPED | OUTPATIENT
Start: 2021-05-10 | End: 2022-08-15 | Stop reason: ALTCHOICE

## 2021-05-20 ENCOUNTER — OFFICE VISIT (OUTPATIENT)
Dept: CARDIOLOGY CLINIC | Age: 66
End: 2021-05-20
Payer: MEDICARE

## 2021-05-20 VITALS
HEIGHT: 64 IN | BODY MASS INDEX: 28.17 KG/M2 | WEIGHT: 165 LBS | HEART RATE: 69 BPM | SYSTOLIC BLOOD PRESSURE: 138 MMHG | OXYGEN SATURATION: 96 % | DIASTOLIC BLOOD PRESSURE: 72 MMHG

## 2021-05-20 DIAGNOSIS — I10 ESSENTIAL HYPERTENSION: ICD-10-CM

## 2021-05-20 DIAGNOSIS — I49.5 SA NODE DYSFUNCTION (HCC): ICD-10-CM

## 2021-05-20 DIAGNOSIS — E78.5 HYPERLIPIDEMIA, UNSPECIFIED HYPERLIPIDEMIA TYPE: ICD-10-CM

## 2021-05-20 DIAGNOSIS — I25.10 CORONARY ARTERY DISEASE INVOLVING NATIVE CORONARY ARTERY OF NATIVE HEART WITHOUT ANGINA PECTORIS: Primary | ICD-10-CM

## 2021-05-20 PROCEDURE — 99214 OFFICE O/P EST MOD 30 MIN: CPT | Performed by: NURSE PRACTITIONER

## 2021-05-20 PROCEDURE — 4004F PT TOBACCO SCREEN RCVD TLK: CPT | Performed by: NURSE PRACTITIONER

## 2021-05-20 PROCEDURE — 1123F ACP DISCUSS/DSCN MKR DOCD: CPT | Performed by: NURSE PRACTITIONER

## 2021-05-20 PROCEDURE — 3017F COLORECTAL CA SCREEN DOC REV: CPT | Performed by: NURSE PRACTITIONER

## 2021-05-20 PROCEDURE — 4040F PNEUMOC VAC/ADMIN/RCVD: CPT | Performed by: NURSE PRACTITIONER

## 2021-05-20 PROCEDURE — G8427 DOCREV CUR MEDS BY ELIG CLIN: HCPCS | Performed by: NURSE PRACTITIONER

## 2021-05-20 PROCEDURE — 93288 INTERROG EVL PM/LDLS PM IP: CPT | Performed by: NURSE PRACTITIONER

## 2021-05-20 PROCEDURE — G8417 CALC BMI ABV UP PARAM F/U: HCPCS | Performed by: NURSE PRACTITIONER

## 2021-05-20 RX ORDER — METHYLPREDNISOLONE 4 MG/1
4 TABLET ORAL SEE ADMIN INSTRUCTIONS
COMMUNITY
End: 2022-08-15 | Stop reason: ALTCHOICE

## 2021-05-20 ASSESSMENT — ENCOUNTER SYMPTOMS
CHEST TIGHTNESS: 0
SHORTNESS OF BREATH: 0
WHEEZING: 0
SORE THROAT: 0
SINUS PRESSURE: 1
COUGH: 0

## 2021-05-20 NOTE — PROGRESS NOTES
[facility]   Established Patient Office Visit   [facility] [address] [facility]  [phone]        OFFICE VISIT:  2021    Genet Prasad - : 1955    Reason For Visit:  Antwan Aguiar is a 77 y.o. male who is here for 6 Month Follow-Up (No cardiac symptoms )    1. Coronary artery disease involving native coronary artery of native heart without angina pectoris    2. SA node dysfunction ([address]    3. Essential hypertension    4. Hyperlipidemia, unspecified hyperlipidemia type      Patient with a history of coronary artery disease, hypertension and SA node dysfunction/pacemaker. He is a patient of Dr. Patric Rhodes. Patient presents to clinic today for 6-month follow-up. Patient denies any complaints of chest pain, pressure or tightness. There is no shortness of breath, orthopnea or PND. Patient denies any lightheadedness, dizziness or syncope. Patient's primary care provider on Monday for upper respiratory infection. He was placed on Z-Lavon, Flonase, promethazine and Medrol Dosepak. Patient states he is feeling much better today. Subjective    Genet Prasad is a 77 y.o. male with the following history as recorded in [facility]:    Patient Active Problem List    Diagnosis Date Noted    H/O heart artery stent 04/15/2019    Chest pain 2018    Mixed hyperlipidemia 2018    Cigarette nicotine dependence in remission 2018    CAD (coronary artery disease) 02/15/2018    Sinoatrial node dysfunction ([facility] [address] 02/15/2018    Tobacco abuse     Osteoarthritis     Carotid artery stenosis     Pacemaker 2013     Current Outpatient Medications   Medication Sig Dispense Refill    methylPREDNISolone (MEDROL DOSEPACK) 4 MG tablet Take 4 mg by mouth See Admin Instructions Take by mouth.  STARTED ON Tuesday morning      atorvastatin (LIPITOR) 40 MG tablet TAKE ONE TABLET BY MOUTH NIGHTLY 30 tablet 5    spironolactone (ALDACTONE) 50 MG tablet Take 50 mg by mouth daily      throat. Respiratory: Negative for cough, chest tightness, shortness of breath and wheezing. Cardiovascular: Negative for chest pain, palpitations and leg swelling. Neurological: Negative for dizziness, syncope, light-headedness and headaches. Psychiatric/Behavioral: Negative for confusion. The patient is not nervous/anxious. Objective:    /72   Pulse 69   Ht 5' 4\" (1.626 m)   Wt 165 lb (74.8 kg)   SpO2 96%   BMI 28.32 kg/m²     GENERAL - well developed and well nourished, conversant  HEENT -  PERRLA, Hearing appears normal, gentleman lids are normal.  External inspection of ears and nose appear normal.  NECK - no thyromegaly, no JVD, trachea is in the midline  CARDIOVASCULAR - PMI is in the mid line clavicular position, Normal S1 and S2 with no systolic murmur. No S3 or S4    PULMONARY - no respiratory distress. No wheezes or rales. Lungs are clear to ausculation, normal respiratory effort. ABDOMEN  - soft, non tender, no rebound  MUSCULOSKELETAL  - range of motion of the upper and lower extermites appears normal and equal and is without pain   EXTREMITIES - no significant edema   NEUROLOGIC - gait and station are normal  SKIN - turgor is normal, can warm and dry. PSYCHIATRIC - normal mood and affect, alert and orientated x 3,      ASSESSMENT:    ALL THE CARDIOLOGY PROBLEMS ARE LISTED ABOVE; HOWEVER, THE FOLLOWING SPECIFIC CARDIAC PROBLEMS / CONDITIONS WERE ADDRESSED AND TREATED DURING THE OFFICE VISIT TODAY:                                                                                            MEDICAL DECISION MAKING             Cardiac Specific Problem / Diagnosis  Discussion and Data Reviewed Diagnostic Procedures Ordered Management Options Selected           1. CAD  Stable   Review and summation of old records:    No chest pain. Patient is on aspirin, Lipitor, and Coreg. No Continue current medications:     Yes           2.  SA Node dysfunction  Stable   Interrogation in the office today battery status 2.93 V, RRT 2.81 V. Lead impedances stable. Thresholds stable. No observations. A paced V sensed 61.3%. [facility] 3 months. Yes Continue current medications:    NA           3. Hyperlipidemia Stable  patient is on Lipitor 40 mg daily. No Continue current medications:       Yes           4. Hypertension Stable  initial blood pressure in the office toda  y 160/80. Rechecked 138/72 O2 sat 96%. Patient is currently on steroid Dosepak. Patient is monitoring his blood pressure at home. Patient to contact our office should blood pressures become more elevated. Patient is on Coreg 25 mg twice daily. No Continue current medications:       Yes     No orders of the defined types were placed in this encounter. No orders of the defined types were placed in this encounter. Discussed with patient. Return in about 6 months (around 11/20/2021) for Dr Armando Trammell . I greatly appreciate the opportunity to meet Mei Lemos and your confidence in allowing me to participate in his cardiovascular care. JEFF Grey NP  5/20/2021 2:27 PM CDT                    This dictation was generated by voice recognition computer software. Although all attempts are made to edit dictation for accuracy, there may be errors in the transcription that are not intended.

## 2021-08-20 ENCOUNTER — TELEPHONE (OUTPATIENT)
Dept: CARDIOLOGY CLINIC | Age: 66
End: 2021-08-20

## 2021-08-23 DIAGNOSIS — I49.5 SA NODE DYSFUNCTION (HCC): ICD-10-CM

## 2021-08-23 DIAGNOSIS — Z95.0 PACEMAKER: Primary | ICD-10-CM

## 2021-08-25 PROCEDURE — 93294 REM INTERROG EVL PM/LDLS PM: CPT | Performed by: NURSE PRACTITIONER

## 2021-08-25 PROCEDURE — 93296 REM INTERROG EVL PM/IDS: CPT | Performed by: NURSE PRACTITIONER

## 2021-09-15 ENCOUNTER — OFFICE VISIT (OUTPATIENT)
Dept: GASTROENTEROLOGY | Facility: CLINIC | Age: 66
End: 2021-09-15

## 2021-09-15 VITALS
BODY MASS INDEX: 28.34 KG/M2 | SYSTOLIC BLOOD PRESSURE: 160 MMHG | WEIGHT: 166 LBS | HEART RATE: 69 BPM | TEMPERATURE: 97.8 F | OXYGEN SATURATION: 98 % | DIASTOLIC BLOOD PRESSURE: 70 MMHG | HEIGHT: 64 IN

## 2021-09-15 DIAGNOSIS — Z85.05 HISTORY OF HEPATOCELLULAR CARCINOMA: Primary | ICD-10-CM

## 2021-09-15 DIAGNOSIS — K70.30 ALCOHOLIC CIRRHOSIS OF LIVER WITHOUT ASCITES (HCC): ICD-10-CM

## 2021-09-15 DIAGNOSIS — Z78.9 NONSMOKER: ICD-10-CM

## 2021-09-15 PROCEDURE — 99214 OFFICE O/P EST MOD 30 MIN: CPT | Performed by: CLINICAL NURSE SPECIALIST

## 2021-09-15 RX ORDER — CEFTRIAXONE 1 G/1
1 INJECTION, POWDER, FOR SOLUTION INTRAMUSCULAR; INTRAVENOUS EVERY 24 HOURS
Status: CANCELLED | OUTPATIENT
Start: 2021-09-15 | End: 2021-09-16

## 2021-09-15 RX ORDER — CARVEDILOL 6.25 MG/1
6.25 TABLET ORAL 2 TIMES DAILY WITH MEALS
COMMUNITY

## 2021-09-15 RX ORDER — ESCITALOPRAM OXALATE 10 MG/1
10 TABLET ORAL DAILY
COMMUNITY

## 2021-09-15 NOTE — PROGRESS NOTES
Azael Holguin  1955    9/15/2021  Chief Complaint   Patient presents with   • GI Problem     Dr. Tejada ref patient for endoscopy     Subjective   HPI  Azael Holguin is a 66 y.o. male who presents with a complaint of hx of hepatocellular cancer. He has requested an Endoscopy for variceal screening. No current signs of bleeding. No associated symptoms. No ascites. He is currently off of his chemo due to kidney functions.      HCC- noted to have liver mass on surveillance US on 12/20/19-4.6 x 3.8 x 2.6 cm lesion was noted. His 7/2/19 screening US was neg for liver mass. AFP on 12/11/19 was 391 and it increased to 503.6 on 12/30/19. CT 1/8/20 with poorly marginated hepatic masses one in segment 2 measuring 3.5 x 2.8 x 3 cm and another segment 4 measuring 4 cm x 3.8 x 2.5 cm. He was discussed at the [facility] Md meeting and Y-90 was suggested. He got Y-90 on 2/27/20. Fu CT scan on 5/20/20 showed decrease in size of lesions the dome lesion now 2.8 x 2.3 x 3.0 cm and the second lesion now 3.6 x 2.8 x 3.0 cm. There continued to be some enhancement at the site of his lesions but it is unclear if this is a treatment effect or represents any residual. His AFP decreased from 511.8 to 30 in March 2020, now down to 2.2 on 9/18/20. Remains on nexavar, but reports lots of dizziness and lightheadedness, diarrhea, loss of appetite, but reports weight gain. Previously did not tolerated 800 mg, and 400 mg qd also gave him significant side effects, hence dose was reduced to 200 mg qd on 9/28/20. Has been on 200 mg qd since then and tolerating the dose very well. In about mid-March 2021, his Sorafenib was stopped by his Nephrologist due to nephrotoxicity. CT scan on 4/29/21 was read as negative for active HCC.     EtOHic cirrhosis- Dxed with Fibrosure in July 2019. He has no H/O ascites. No H/O GI bleed. He has had some episodes of mild confusion off and on since Sept 2019. Eats about 2-3 meals/day. Does not drink Ensure at night. Does not  exercise regularly. C/o fatigue since July 2019, stable, mild to moderate, not associated with SOB.      Chr Hep C - was told that his LFTs were elevated in 2018. But was formally diagnosed with Hep C in July 2019. Started 12 wk course of Epclusa on 11/21/19. Finished it in late Feb 2020. Hep C VL on 3/26/20 was neg. Hence EOT VL was neg.    Past Medical History:   Diagnosis Date   • Arthritis    • Brain stem stroke syndrome    • CAD (coronary artery disease)    • COPD (chronic obstructive pulmonary disease) (CMS/HCC)    • Hyperlipidemia    • Hypertension    • Psoriasis      Past Surgical History:   Procedure Laterality Date   • A-V CARDIAC PACEMAKER INSERTION     • CARDIAC CATHETERIZATION      stents x2   • COLONOSCOPY  02/07/2011    Non-bleeding hemorrhoids repeat exam in 3 months due to poor bowel prep   • COLONOSCOPY N/A 6/17/2019    Hemorrhoids repeat exam in 10 years       Outpatient Medications Marked as Taking for the 9/15/21 encounter (Office Visit) with Ondina Jiménez APRN   Medication Sig Dispense Refill   • aspirin 81 MG chewable tablet Chew 1 tablet (81 mg) by oral route once daily     • atorvastatin (LIPITOR) 40 MG tablet Take 40 mg by mouth every night at bedtime.  5   • carvedilol (COREG) 6.25 MG tablet Take 6.25 mg by mouth 2 (Two) Times a Day With Meals.     • escitalopram (LEXAPRO) 10 MG tablet Take 10 mg by mouth Daily.     • mometasone (ELOCON) 0.1 % cream Apply a few drops to the affected are(s) by topical route once daily for 30 days     • Zinc 50 MG capsule Take  by mouth. Daily       Allergies   Allergen Reactions   • Codeine Sulfate Swelling     Social History     Socioeconomic History   • Marital status: Single     Spouse name: Not on file   • Number of children: Not on file   • Years of education: Not on file   • Highest education level: Not on file   Tobacco Use   • Smoking status: Current Every Day Smoker     Packs/day: 1.00     Years: 50.00     Pack years: 50.00     Types:  Cigarettes   • Smokeless tobacco: Never Used   Substance and Sexual Activity   • Alcohol use: No     Comment: Quit drinking 7 years ago   • Drug use: No     Comment: hasnt used illegal drugs in 5 yrs   • Sexual activity: Defer     Family History   Problem Relation Age of Onset   • Diabetes Father    • Hypertension Father    • Heart disease Father    • Colon cancer Neg Hx    • Colon polyps Neg Hx      Health Maintenance   Topic Date Due   • Pneumococcal Vaccine 65+ (1 of 2 - PPSV23) Never done   • Hepatitis B (1 of 3 - Risk 3-dose series) Never done   • ZOSTER VACCINE (1 of 2) Never done   • LUNG CANCER SCREENING  Never done   • ANNUAL WELLNESS VISIT  Never done   • LIPID PANEL  03/25/2019   • INFLUENZA VACCINE  10/01/2021   • TDAP/TD VACCINES (2 - Td or Tdap) 01/12/2028   • COLORECTAL CANCER SCREENING  06/17/2029   • HEPATITIS C SCREENING  Completed   • COVID-19 Vaccine  Completed     Review of Systems   Constitutional: Negative for activity change, appetite change, chills, diaphoresis, fatigue, fever and unexpected weight change.   HENT: Negative for ear pain, hearing loss, mouth sores, sore throat, trouble swallowing and voice change.    Eyes: Negative.    Respiratory: Negative for cough, choking, shortness of breath and wheezing.    Cardiovascular: Negative for chest pain and palpitations.   Gastrointestinal: Negative for abdominal pain, blood in stool, constipation, diarrhea, nausea and vomiting.   Endocrine: Negative for cold intolerance and heat intolerance.   Genitourinary: Negative for decreased urine volume, dysuria, frequency, hematuria and urgency.   Musculoskeletal: Negative for back pain, gait problem and myalgias.   Skin: Negative for color change, pallor and rash.   Allergic/Immunologic: Negative for food allergies and immunocompromised state.   Neurological: Negative for dizziness, tremors, seizures, syncope, weakness, light-headedness, numbness and headaches.   Hematological: Negative for  "adenopathy. Does not bruise/bleed easily.   Psychiatric/Behavioral: Negative for agitation and confusion. The patient is not nervous/anxious.    All other systems reviewed and are negative.    Objective   Vitals:    09/15/21 1316   BP: 160/70   Pulse: 69   Temp: 97.8 °F (36.6 °C)   SpO2: 98%   Weight: 75.3 kg (166 lb)   Height: 162.6 cm (64\")     Body mass index is 28.49 kg/m².  Physical Exam  Constitutional:       Appearance: He is well-developed.   HENT:      Head: Normocephalic and atraumatic.   Eyes:      Pupils: Pupils are equal, round, and reactive to light.   Neck:      Trachea: No tracheal deviation.   Cardiovascular:      Rate and Rhythm: Normal rate and regular rhythm.      Heart sounds: Normal heart sounds. No murmur heard.   No friction rub. No gallop.    Pulmonary:      Effort: Pulmonary effort is normal. No respiratory distress.      Breath sounds: Normal breath sounds. No wheezing or rales.   Chest:      Chest wall: No tenderness.   Abdominal:      General: Bowel sounds are normal. There is no distension.      Palpations: Abdomen is soft. Abdomen is not rigid.      Tenderness: There is no abdominal tenderness. There is no guarding or rebound.   Musculoskeletal:         General: No tenderness or deformity. Normal range of motion.      Cervical back: Normal range of motion and neck supple.   Skin:     General: Skin is warm and dry.      Coloration: Skin is not pale.      Findings: No rash.   Neurological:      Mental Status: He is alert and oriented to person, place, and time.      Deep Tendon Reflexes: Reflexes are normal and symmetric.   Psychiatric:         Behavior: Behavior normal.         Thought Content: Thought content normal.         Judgment: Judgment normal.       Assessment/Plan   Diagnoses and all orders for this visit:    1. History of hepatocellular carcinoma (Primary)    2. Alcoholic cirrhosis of liver without ascites (CMS/HCC)  -     Case Request; Standing  -     Follow Anesthesia " Guidelines / Standing Orders; Future  -     Obtain Informed Consent; Future  -     Implement Anesthesia Orders Day of Procedure; Standing  -     Obtain Informed Consent; Standing  -     cefTRIAXone (ROCEPHIN) injection 1 g  -     Case Request    3. Nonsmoker    He has not had a previous Endoscopy evaluation    ESOPHAGOGASTRODUODENOSCOPY WITH ANESTHESIA (N/A)  Part of this note may be an electronic transcription/translation of spoken language to printed text using the Dragon Dictation System.  Body mass index is 28.49 kg/m².  No follow-ups on file.    Patient's Body mass index is 28.49 kg/m². indicating that he is overweight (BMI 25-29.9). Obesity-related health conditions include the following: GERD. Obesity is unchanged. BMI is is above average; BMI management plan is completed. We discussed portion control and increasing exercise..      All risks, benefits, alternatives, and indications of colonoscopy and/or Endoscopy procedure have been discussed with the patient. Risks to include perforation of the colon requiring possible surgery or colostomy, risk of bleeding from biopsies or removal of colon tissue, possibility of missing a colon polyp or cancer, or adverse drug reaction.  Benefits to include the diagnosis and management of disease of the colon and rectum. Alternatives to include barium enema, radiographic evaluation, lab testing or no intervention. Pt verbalizes understanding and agrees.     Ondina Jiménez, APRN  9/15/2021  13:45 CDT          If you smoke or use tobacco, 4 minutes reading provided  Steps to Quit Smoking  Smoking tobacco can be harmful to your health and can affect almost every organ in your body. Smoking puts you, and those around you, at risk for developing many serious chronic diseases. Quitting smoking is difficult, but it is one of the best things that you can do for your health. It is never too late to quit.  What are the benefits of quitting smoking?  When you quit smoking, you  lower your risk of developing serious diseases and conditions, such as:  · Lung cancer or lung disease, such as COPD.  · Heart disease.  · Stroke.  · Heart attack.  · Infertility.  · Osteoporosis and bone fractures.  Additionally, symptoms such as coughing, wheezing, and shortness of breath may get better when you quit. You may also find that you get sick less often because your body is stronger at fighting off colds and infections. If you are pregnant, quitting smoking can help to reduce your chances of having a baby of low birth weight.  How do I get ready to quit?  When you decide to quit smoking, create a plan to make sure that you are successful. Before you quit:  · Pick a date to quit. Set a date within the next two weeks to give you time to prepare.  · Write down the reasons why you are quitting. Keep this list in places where you will see it often, such as on your bathroom mirror or in your car or wallet.  · Identify the people, places, things, and activities that make you want to smoke (triggers) and avoid them. Make sure to take these actions:  ¨ Throw away all cigarettes at home, at work, and in your car.  ¨ Throw away smoking accessories, such as ashtrays and lighters.  ¨ Clean your car and make sure to empty the ashtray.  ¨ Clean your home, including curtains and carpets.  · Tell your family, friends, and coworkers that you are quitting. Support from your loved ones can make quitting easier.  · Talk with your health care provider about your options for quitting smoking.  · Find out what treatment options are covered by your health insurance.  What strategies can I use to quit smoking?  Talk with your healthcare provider about different strategies to quit smoking. Some strategies include:  · Quitting smoking altogether instead of gradually lessening how much you smoke over a period of time. Research shows that quitting “cold turkey” is more successful than gradually quitting.  · Attending in-person  counseling to help you build problem-solving skills. You are more likely to have success in quitting if you attend several counseling sessions. Even short sessions of 10 minutes can be effective.  · Finding resources and support systems that can help you to quit smoking and remain smoke-free after you quit. These resources are most helpful when you use them often. They can include:  ¨ Online chats with a counselor.  ¨ Telephone quitlines.  ¨ Printed self-help materials.  ¨ Support groups or group counseling.  ¨ Text messaging programs.  ¨ Mobile phone applications.  · Taking medicines to help you quit smoking. (If you are pregnant or breastfeeding, talk with your health care provider first.) Some medicines contain nicotine and some do not. Both types of medicines help with cravings, but the medicines that include nicotine help to relieve withdrawal symptoms. Your health care provider may recommend:  ¨ Nicotine patches, gum, or lozenges.  ¨ Nicotine inhalers or sprays.  ¨ Non-nicotine medicine that is taken by mouth.  Talk with your health care provider about combining strategies, such as taking medicines while you are also receiving in-person counseling. Using these two strategies together makes you more likely to succeed in quitting than if you used either strategy on its own.  If you are pregnant or breastfeeding, talk with your health care provider about finding counseling or other support strategies to quit smoking. Do not take medicine to help you quit smoking unless told to do so by your health care provider.  What things can I do to make it easier to quit?  Quitting smoking might feel overwhelming at first, but there is a lot that you can do to make it easier. Take these important actions:  · Reach out to your family and friends and ask that they support and encourage you during this time. Call telephone quitlines, reach out to support groups, or work with a counselor for support.  · Ask people who smoke to  avoid smoking around you.  · Avoid places that trigger you to smoke, such as bars, parties, or smoke-break areas at work.  · Spend time around people who do not smoke.  · Lessen stress in your life, because stress can be a smoking trigger for some people. To lessen stress, try:  ¨ Exercising regularly.  ¨ Deep-breathing exercises.  ¨ Yoga.  ¨ Meditating.  ¨ Performing a body scan. This involves closing your eyes, scanning your body from head to toe, and noticing which parts of your body are particularly tense. Purposefully relax the muscles in those areas.  · Download or purchase mobile phone or tablet apps (applications) that can help you stick to your quit plan by providing reminders, tips, and encouragement. There are many free apps, such as QuitGuide from the CDC (Centers for Disease Control and Prevention). You can find other support for quitting smoking (smoking cessation) through smokefree.gov and other websites.  How will I feel when I quit smoking?  Within the first 24 hours of quitting smoking, you may start to feel some withdrawal symptoms. These symptoms are usually most noticeable 2-3 days after quitting, but they usually do not last beyond 2-3 weeks. Changes or symptoms that you might experience include:  · Mood swings.  · Restlessness, anxiety, or irritation.  · Difficulty concentrating.  · Dizziness.  · Strong cravings for sugary foods in addition to nicotine.  · Mild weight gain.  · Constipation.  · Nausea.  · Coughing or a sore throat.  · Changes in how your medicines work in your body.  · A depressed mood.  · Difficulty sleeping (insomnia).  After the first 2-3 weeks of quitting, you may start to notice more positive results, such as:  · Improved sense of smell and taste.  · Decreased coughing and sore throat.  · Slower heart rate.  · Lower blood pressure.  · Clearer skin.  · The ability to breathe more easily.  · Fewer sick days.  Quitting smoking is very challenging for most people. Do not get  discouraged if you are not successful the first time. Some people need to make many attempts to quit before they achieve long-term success. Do your best to stick to your quit plan, and talk with your health care provider if you have any questions or concerns.  This information is not intended to replace advice given to you by your health care provider. Make sure you discuss any questions you have with your health care provider.  Document Released: 12/12/2002 Document Revised: 08/15/2017 Document Reviewed: 05/03/2016  Elsevier Interactive Patient Education © 2017 [employer]

## 2021-09-16 PROBLEM — K70.30 ALCOHOLIC CIRRHOSIS OF LIVER WITHOUT ASCITES (HCC): Status: ACTIVE | Noted: 2021-09-16

## 2021-10-12 DIAGNOSIS — Z01.818 PRE-OP TESTING: Primary | ICD-10-CM

## 2021-10-18 ENCOUNTER — LAB (OUTPATIENT)
Dept: LAB | Facility: HOSPITAL | Age: 66
End: 2021-10-18

## 2021-10-18 LAB — SARS-COV-2 ORF1AB RESP QL NAA+PROBE: NOT DETECTED

## 2021-10-18 PROCEDURE — U0004 COV-19 TEST NON-CDC HGH THRU: HCPCS | Performed by: INTERNAL MEDICINE

## 2021-10-18 PROCEDURE — C9803 HOPD COVID-19 SPEC COLLECT: HCPCS | Performed by: INTERNAL MEDICINE

## 2021-10-18 PROCEDURE — U0005 INFEC AGEN DETEC AMPLI PROBE: HCPCS | Performed by: INTERNAL MEDICINE

## 2021-10-19 ENCOUNTER — ANESTHESIA (OUTPATIENT)
Dept: GASTROENTEROLOGY | Facility: HOSPITAL | Age: 66
End: 2021-10-19

## 2021-10-19 ENCOUNTER — HOSPITAL ENCOUNTER (OUTPATIENT)
Facility: HOSPITAL | Age: 66
Setting detail: HOSPITAL OUTPATIENT SURGERY
Discharge: HOME | End: 2021-10-19
Attending: INTERNAL MEDICINE | Admitting: INTERNAL MEDICINE

## 2021-10-19 ENCOUNTER — ANESTHESIA EVENT (OUTPATIENT)
Dept: GASTROENTEROLOGY | Facility: HOSPITAL | Age: 66
End: 2021-10-19

## 2021-10-19 VITALS
HEIGHT: 63 IN | HEART RATE: 63 BPM | DIASTOLIC BLOOD PRESSURE: 89 MMHG | OXYGEN SATURATION: 95 % | BODY MASS INDEX: 29.41 KG/M2 | TEMPERATURE: 97.5 F | WEIGHT: 166 LBS | RESPIRATION RATE: 20 BRPM | SYSTOLIC BLOOD PRESSURE: 173 MMHG

## 2021-10-19 DIAGNOSIS — K70.30 ALCOHOLIC CIRRHOSIS OF LIVER WITHOUT ASCITES (HCC): ICD-10-CM

## 2021-10-19 PROCEDURE — 43235 EGD DIAGNOSTIC BRUSH WASH: CPT | Performed by: INTERNAL MEDICINE

## 2021-10-19 PROCEDURE — 25010000002 PROPOFOL 10 MG/ML EMULSION: Performed by: NURSE ANESTHETIST, CERTIFIED REGISTERED

## 2021-10-19 PROCEDURE — 25010000002 CEFTRIAXONE PER 250 MG: Performed by: INTERNAL MEDICINE

## 2021-10-19 RX ORDER — PROPOFOL 10 MG/ML
VIAL (ML) INTRAVENOUS AS NEEDED
Status: DISCONTINUED | OUTPATIENT
Start: 2021-10-19 | End: 2021-10-19 | Stop reason: SURG

## 2021-10-19 RX ORDER — SODIUM CHLORIDE 9 MG/ML
500 INJECTION, SOLUTION INTRAVENOUS CONTINUOUS PRN
Status: DISCONTINUED | OUTPATIENT
Start: 2021-10-19 | End: 2021-10-19 | Stop reason: HOSPADM

## 2021-10-19 RX ORDER — SODIUM CHLORIDE 0.9 % (FLUSH) 0.9 %
10 SYRINGE (ML) INJECTION AS NEEDED
Status: DISCONTINUED | OUTPATIENT
Start: 2021-10-19 | End: 2021-10-19 | Stop reason: HOSPADM

## 2021-10-19 RX ORDER — LIDOCAINE HYDROCHLORIDE 10 MG/ML
0.5 INJECTION, SOLUTION EPIDURAL; INFILTRATION; INTRACAUDAL; PERINEURAL ONCE AS NEEDED
Status: CANCELLED | OUTPATIENT
Start: 2021-10-19

## 2021-10-19 RX ORDER — CEFTRIAXONE 1 G/1
1 INJECTION, POWDER, FOR SOLUTION INTRAMUSCULAR; INTRAVENOUS EVERY 24 HOURS
Status: DISCONTINUED | OUTPATIENT
Start: 2021-10-19 | End: 2021-10-19

## 2021-10-19 RX ORDER — LIDOCAINE HYDROCHLORIDE 20 MG/ML
INJECTION, SOLUTION EPIDURAL; INFILTRATION; INTRACAUDAL; PERINEURAL AS NEEDED
Status: DISCONTINUED | OUTPATIENT
Start: 2021-10-19 | End: 2021-10-19 | Stop reason: SURG

## 2021-10-19 RX ADMIN — PROPOFOL 200 MG: 10 INJECTION, EMULSION INTRAVENOUS at 12:23

## 2021-10-19 RX ADMIN — SODIUM CHLORIDE 1 G: 9 INJECTION, SOLUTION INTRAVENOUS at 10:25

## 2021-10-19 RX ADMIN — LIDOCAINE HYDROCHLORIDE 100 MG: 20 INJECTION, SOLUTION EPIDURAL; INFILTRATION; INTRACAUDAL; PERINEURAL at 12:23

## 2021-10-19 RX ADMIN — SODIUM CHLORIDE 500 ML: 9 INJECTION, SOLUTION INTRAVENOUS at 10:29

## 2021-10-19 NOTE — ANESTHESIA PREPROCEDURE EVALUATION
Anesthesia Evaluation     Patient summary reviewed and Nursing notes reviewed   no history of anesthetic complications:  NPO Solid Status: > 8 hours  NPO Liquid Status: > 8 hours           Airway   Mallampati: I  TM distance: >3 FB  Neck ROM: full  No difficulty expected  Dental      Pulmonary    (+) a smoker Current, COPD,   Cardiovascular   Exercise tolerance: good (4-7 METS)    (+) pacemaker pacemaker, hypertension, CAD, cardiac stents (two stents placed 2018) more than 12 months ago hyperlipidemia,     ROS comment: [employer] pacemaker- checked 5/2021  Interrogation in the office today battery status 2.93 V, RRT 2.81 V. Lead impedances stable. Thresholds stable. No observations. A paced V sensed 61.3%.         Neuro/Psych  (+) CVA,     (-) seizures  GI/Hepatic/Renal/Endo    (+)   hepatitis C, liver disease, renal disease CRI,   (-) diabetes    ROS Comment: Alcoholic cirrhosis  Hepatocellular carrcinoma, monitoring    Musculoskeletal     Abdominal    Substance History      OB/GYN          Other   arthritis,                      Anesthesia Plan    ASA 3     MAC     intravenous induction     Anesthetic plan, all risks, benefits, and alternatives have been provided, discussed and informed consent has been obtained with: patient.

## 2021-10-19 NOTE — ANESTHESIA POSTPROCEDURE EVALUATION
Patient: Azael Holguin    Procedure Summary     Date: 10/19/21 Room / Location: [facility] ENDOSCOPY 2 /  [facility]    Anesthesia Start: 1222 Anesthesia Stop: 1231    Procedure: ESOPHAGOGASTRODUODENOSCOPY WITH ANESTHESIA (N/A ) Diagnosis:       Alcoholic cirrhosis of liver without ascites (HCC)      (Alcoholic cirrhosis of liver without ascites (CMS/HCC) [K70.30])    Surgeons: Josue Buckner MD Provider: Ita Pastrana CRNA    Anesthesia Type: MAC ASA Status: 3          Anesthesia Type: MAC    Vitals  Vitals Value Taken Time   /89 10/19/21 1231   Temp     Pulse 60 10/19/21 1232   Resp 15 10/19/21 1231   SpO2 93 % 10/19/21 1232   Vitals shown include unvalidated device data.        Post Anesthesia Care and Evaluation    Patient location during evaluation: PHASE II  Patient participation: complete - patient participated  Level of consciousness: awake and alert  Pain management: adequate  Airway patency: patent  Anesthetic complications: No anesthetic complications  PONV Status: none  Cardiovascular status: acceptable  Respiratory status: acceptable  Hydration status: acceptable  No anesthesia care post op

## 2021-10-19 NOTE — H&P
[facility]  Pre Procedure History & Physical    Chief Complaint:   History of hepatocellular carcinoma    Subjective     HPI:   For endoscopy.  History of hepatocellular carcinoma.  Endoscopy for esophageal variceal surveillance    Past Medical History:   Past Medical History:   Diagnosis Date   • Arthritis    • Brain stem stroke syndrome    • CAD (coronary artery disease)    • COPD (chronic obstructive pulmonary disease) (HCC)    • Hyperlipidemia    • Hypertension    • Psoriasis        Past Surgical History:  Past Surgical History:   Procedure Laterality Date   • A-V CARDIAC PACEMAKER INSERTION     • CARDIAC CATHETERIZATION      stents x2   • COLONOSCOPY  02/07/2011    Non-bleeding hemorrhoids repeat exam in 3 months due to poor bowel prep   • COLONOSCOPY N/A 6/17/2019    Hemorrhoids repeat exam in 10 years       Family History:  Family History   Problem Relation Age of Onset   • Diabetes Father    • Hypertension Father    • Heart disease Father    • Colon cancer Neg Hx    • Colon polyps Neg Hx        Social History:   reports that he has been smoking cigarettes. He has a 50.00 pack-year smoking history. He has never used smokeless tobacco. He reports that he does not drink alcohol and does not use drugs.    Medications:   Prior to Admission medications    Medication Sig Start Date End Date Taking? Authorizing Provider   aspirin 81 MG chewable tablet Chew 1 tablet (81 mg) by oral route once daily   Yes ProviderGray MD   atorvastatin (LIPITOR) 40 MG tablet Take 40 mg by mouth every night at bedtime. 2/25/19  Yes ProviderGray MD   carvedilol (COREG) 6.25 MG tablet Take 6.25 mg by mouth 2 (Two) Times a Day With Meals.   Yes Gray Duran MD   escitalopram (LEXAPRO) 10 MG tablet Take 10 mg by mouth Daily.   Yes Gray Duran MD   mometasone (ELOCON) 0.1 % cream Apply a few drops to the affected are(s) by topical route once daily for 30 days 7/18/16  Yes Provider  "MD Gray   Zinc 50 MG capsule Take  by mouth. Daily   Yes Provider, MD Gray       Allergies:  Codeine sulfate    Objective     Blood pressure (!) 183/97, pulse 72, temperature 97.5 °F (36.4 °C), temperature source Temporal, resp. rate 20, height 160 cm (63\"), weight 75.3 kg (166 lb), SpO2 98 %.    Physical Exam   Constitutional: Pt is oriented to person, place, and in no distress.   HENT: Mouth/Throat: Oropharynx is clear.   Cardiovascular: Normal rate, regular rhythm.    Pulmonary/Chest: Effort normal. No respiratory distress. No  wheezes.   Abdominal: Soft. Non-distended.  Skin: Skin is warm and dry.   Psychiatric: Mood, memory, affect and judgment appear normal.     Assessment/Plan     Diagnosis:  Portal hypertension with hepatocellular carcinoma    Anticipated Surgical Procedure:    Seed with endoscopy as scheduled    The following major R/B/A were discussed with the patient, however the list is not all inclusive . Risk:  Bleeding (immediate and delayed), perforation (rupture or tear), reaction to medication, missed lesion/cancer, pain during the procedure, infection, need for surgery, need for ostomy, need for mechanical ventilation (breathing machine), death.  Benefits: removal of polyp/tissue, burn/clip/or inject to stop bleeding, removal of foreign body, dilate any stricture.  Alternatives: Xray or CT, surgery, do nothing with associated risk   The patient was given time to ask question and received explanation, and agrees to proceed as per History and Physical.   No guarantee given or expressed.    EMR Dragon/transcription disclaimer: Much of this encounter note is an electronic transcription/translation of spoken language to printed text.  The electronic translation of spoken language may permit erroneous, or at times, nonsensical words or phrases to be inadvertently transcribed.  Although I have reviewed the note for such errors, some may still exist.    Josue Buckner MD  12:22 " CDT  10/19/2021

## 2021-10-26 ENCOUNTER — TELEPHONE (OUTPATIENT)
Dept: GASTROENTEROLOGY | Facility: CLINIC | Age: 66
End: 2021-10-26

## 2021-11-02 ENCOUNTER — HOSPITAL ENCOUNTER (OUTPATIENT)
Dept: CT IMAGING | Age: 66
Discharge: HOME | End: 2021-11-02
Payer: MEDICARE

## 2021-11-02 DIAGNOSIS — C22.0 LIVER CARCINOMA (HCC): ICD-10-CM

## 2021-11-02 LAB
GFR AFRICAN AMERICAN: 57
GFR NON-AFRICAN AMERICAN: 47
POC CREATININE: 1.5 MG/DL (ref 0.3–1.3)

## 2021-11-02 PROCEDURE — 6360000004 HC RX CONTRAST MEDICATION: Performed by: INTERNAL MEDICINE

## 2021-11-02 PROCEDURE — 74178 CT ABD&PLV WO CNTR FLWD CNTR: CPT

## 2021-11-02 PROCEDURE — 82565 ASSAY OF CREATININE: CPT

## 2021-11-02 RX ADMIN — IOPAMIDOL 75 ML: 755 INJECTION, SOLUTION INTRAVENOUS at 11:36

## 2021-11-19 ENCOUNTER — TELEPHONE (OUTPATIENT)
Dept: CARDIOLOGY CLINIC | Age: 66
End: 2021-11-19

## 2021-11-23 ENCOUNTER — TELEPHONE (OUTPATIENT)
Dept: CARDIOLOGY CLINIC | Age: 66
End: 2021-11-23

## 2022-01-19 DIAGNOSIS — I49.5 SA NODE DYSFUNCTION (HCC): ICD-10-CM

## 2022-01-19 DIAGNOSIS — Z95.0 PACEMAKER: Primary | ICD-10-CM

## 2022-01-20 PROCEDURE — 93294 REM INTERROG EVL PM/LDLS PM: CPT | Performed by: NURSE PRACTITIONER

## 2022-01-20 PROCEDURE — 93296 REM INTERROG EVL PM/IDS: CPT | Performed by: NURSE PRACTITIONER

## 2022-02-11 ENCOUNTER — TELEPHONE (OUTPATIENT)
Dept: CARDIOLOGY CLINIC | Age: 67
End: 2022-02-11

## 2022-02-14 ENCOUNTER — OFFICE VISIT (OUTPATIENT)
Dept: CARDIOLOGY CLINIC | Age: 67
End: 2022-02-14
Payer: MEDICARE

## 2022-02-14 VITALS
HEART RATE: 64 BPM | HEIGHT: 66 IN | WEIGHT: 165 LBS | SYSTOLIC BLOOD PRESSURE: 138 MMHG | BODY MASS INDEX: 26.52 KG/M2 | DIASTOLIC BLOOD PRESSURE: 80 MMHG

## 2022-02-14 DIAGNOSIS — Z95.5 H/O HEART ARTERY STENT: ICD-10-CM

## 2022-02-14 DIAGNOSIS — Z95.0 PACEMAKER: ICD-10-CM

## 2022-02-14 DIAGNOSIS — I25.10 CORONARY ARTERY DISEASE INVOLVING NATIVE CORONARY ARTERY OF NATIVE HEART WITHOUT ANGINA PECTORIS: ICD-10-CM

## 2022-02-14 DIAGNOSIS — E78.2 MIXED HYPERLIPIDEMIA: ICD-10-CM

## 2022-02-14 DIAGNOSIS — I49.5 SINOATRIAL NODE DYSFUNCTION (HCC): ICD-10-CM

## 2022-02-14 DIAGNOSIS — I20.8 OTHER FORMS OF ANGINA PECTORIS (HCC): Primary | ICD-10-CM

## 2022-02-14 PROCEDURE — 3017F COLORECTAL CA SCREEN DOC REV: CPT | Performed by: INTERNAL MEDICINE

## 2022-02-14 PROCEDURE — 4004F PT TOBACCO SCREEN RCVD TLK: CPT | Performed by: INTERNAL MEDICINE

## 2022-02-14 PROCEDURE — 1123F ACP DISCUSS/DSCN MKR DOCD: CPT | Performed by: INTERNAL MEDICINE

## 2022-02-14 PROCEDURE — G8417 CALC BMI ABV UP PARAM F/U: HCPCS | Performed by: INTERNAL MEDICINE

## 2022-02-14 PROCEDURE — 4040F PNEUMOC VAC/ADMIN/RCVD: CPT | Performed by: INTERNAL MEDICINE

## 2022-02-14 PROCEDURE — 99212 OFFICE O/P EST SF 10 MIN: CPT | Performed by: INTERNAL MEDICINE

## 2022-02-14 PROCEDURE — G8427 DOCREV CUR MEDS BY ELIG CLIN: HCPCS | Performed by: INTERNAL MEDICINE

## 2022-02-14 PROCEDURE — G8484 FLU IMMUNIZE NO ADMIN: HCPCS | Performed by: INTERNAL MEDICINE

## 2022-02-14 RX ORDER — LANOLIN ALCOHOL/MO/W.PET/CERES
1000 CREAM (GRAM) TOPICAL DAILY
COMMUNITY

## 2022-02-14 RX ORDER — AMLODIPINE BESYLATE 5 MG/1
2.5 TABLET ORAL
COMMUNITY

## 2022-02-14 RX ORDER — ERGOCALCIFEROL 1.25 MG/1
50000 CAPSULE ORAL WEEKLY
COMMUNITY

## 2022-02-14 RX ORDER — ESCITALOPRAM OXALATE 10 MG/1
10 TABLET ORAL DAILY
COMMUNITY

## 2022-02-14 RX ORDER — CHOLECALCIFEROL (VITAMIN D3) 1250 MCG
CAPSULE ORAL
COMMUNITY

## 2022-02-14 ASSESSMENT — ENCOUNTER SYMPTOMS
SHORTNESS OF BREATH: 0
NAUSEA: 0
RESPIRATORY NEGATIVE: 1
VOMITING: 0
GASTROINTESTINAL NEGATIVE: 1
DIARRHEA: 0
EYES NEGATIVE: 1

## 2022-02-14 NOTE — PROGRESS NOTES
[facility] Progress Note                            Date:  2/14/2022  Patient: Christin Townsend  Age:  77 y.o., 1955      Reason for evaluation:         SUBJECTIVE:    Returns today for follow-up assessment. Follow-up for coronary artery disease sinus node dysfunction pacemaker previous stent hyperlipidemia and chest pain overall doing well denies anginal chest pain or limiting dyspnea. Blood pressure 138/80 heart 64. No other complaints or issues reported. Review of Systems   Constitutional: Negative. Negative for chills, fever and unexpected weight change. HENT: Negative. Eyes: Negative. Respiratory: Negative. Negative for shortness of breath. Cardiovascular: Negative. Negative for chest pain. Gastrointestinal: Negative. Negative for diarrhea, nausea and vomiting. Endocrine: Negative. Genitourinary: Negative. Musculoskeletal: Negative. Skin: Negative. Neurological: Negative. All other systems reviewed and are negative. OBJECTIVE:     /80   Pulse 64   Ht 5' 6\" (1.676 m)   Wt 165 lb (74.8 kg)   BMI 26.63 kg/m²     Labs:   CBC: No results for input(s): WBC, HGB, HCT, PLT in the last 72 hours. BMP:No results for input(s): NA, K, CO2, BUN, CREATININE, LABGLOM, GLUCOSE in the last 72 hours. BNP: No results for input(s): BNP in the last 72 hours. PT/INR: No results for input(s): PROTIME, INR in the last 72 hours. APTT:No results for input(s): APTT in the last 72 hours. CARDIAC ENZYMES:No results for input(s): CKTOTAL, CKMB, CKMBINDEX, TROPONINI in the last 72 hours. FASTING LIPID PANEL:  Lab Results   Component Value Date    HDL 22 02/27/2018    LDLDIRECT 105 02/23/2013    LDLCALC 40 02/27/2018    TRIG 119 02/27/2018     LIVER PROFILE:No results for input(s): AST, ALT, LABALBU in the last 72 hours.         Past Medical History:   Diagnosis Date    Bradycardia     3/5/13  loop recorder    Cancer [facility] [address]     Carotid artery stenosis     Chronic kidney disease     Cigarette nicotine dependence in remission 2/21/2018    Hypoglycemia     Mixed hyperlipidemia 2/21/2018    Near syncope     Osteoarthritis     Pacemaker 05/22/2013    loop recorder removed    Recovering alcoholic ([facility] [address]     S/P carotid endarterectomy     right internal carotid    Sinoatrial node dysfunction ([facility] [address] 2/15/2018    3/5/13  loop recorder 5/22/2013  Dual chamber PPM    Tobacco abuse      Past Surgical History:   Procedure Laterality Date    CORONARY ANGIOPLASTY WITH STENT PLACEMENT  02/15/2018    SP prox LAD and Osteal diagonal    CORONARY ANGIOPLASTY WITH STENT PLACEMENT  02/26/2018    SP to osteal    PACEMAKER INSERTION  5/22/2013    VASCULAR SURGERY  2-27-13 [facility]    Right Carotid endarterectomy, eversion technique with completion duplex US     Family History   Problem Relation Age of Onset    Diabetes Mother     Heart Disease Father     High Blood Pressure Father     Diabetes Father     Stroke Father      Allergies   Allergen Reactions    Codeine Anaphylaxis     Current Outpatient Medications   Medication Sig Dispense Refill    escitalopram (LEXAPRO) 10 MG tablet Take 10 mg by mouth daily      Cholecalciferol (VITAMIN D3) 1.25 MG (06751 UT) CAPS Take by mouth      vitamin B-12 (CYANOCOBALAMIN) 1000 MCG tablet Take 1,000 mcg by mouth daily      vitamin D (ERGOCALCIFEROL) 1.25 MG (13631 UT) CAPS capsule Take 50,000 Units by mouth once a week      amLODIPine (NORVASC) 5 MG tablet Take 2.5 mg by mouth 1/2 in am 1/2 in pm      atorvastatin (LIPITOR) 40 MG tablet TAKE ONE TABLET BY MOUTH NIGHTLY 30 tablet 5    carvedilol (COREG) 6.25 MG tablet Take 25 mg by mouth 2 times daily (with meals)       ZINC PO Take by mouth      Multiple Vitamins-Minerals (THERAPEUTIC MULTIVITAMIN-MINERALS) tablet Take 1 tablet by mouth daily      aspirin 81 MG chewable tablet Take 1 tablet by mouth daily 30 tablet 3    methylPREDNISolone (MEDROL DOSEPACK) 4 MG tablet Take 4 mg by the transcription that are not intended.

## 2022-04-21 ENCOUNTER — TELEPHONE (OUTPATIENT)
Dept: CARDIOLOGY CLINIC | Age: 67
End: 2022-04-21

## 2022-04-21 DIAGNOSIS — Z95.0 PACEMAKER: Primary | ICD-10-CM

## 2022-04-21 DIAGNOSIS — I49.5 SINOATRIAL NODE DYSFUNCTION (HCC): ICD-10-CM

## 2022-04-21 PROCEDURE — 93294 REM INTERROG EVL PM/LDLS PM: CPT | Performed by: CLINICAL NURSE SPECIALIST

## 2022-04-21 PROCEDURE — 93296 REM INTERROG EVL PM/IDS: CPT | Performed by: CLINICAL NURSE SPECIALIST

## 2022-07-26 ENCOUNTER — TELEPHONE (OUTPATIENT)
Dept: CARDIOLOGY CLINIC | Age: 67
End: 2022-07-26

## 2022-08-01 ENCOUNTER — TRANSCRIBE ORDERS (OUTPATIENT)
Dept: ADMINISTRATIVE | Facility: HOSPITAL | Age: 67
End: 2022-08-01

## 2022-08-01 DIAGNOSIS — R31.9 HEMATURIA, UNSPECIFIED TYPE: Primary | ICD-10-CM

## 2022-08-02 ENCOUNTER — HOSPITAL ENCOUNTER (OUTPATIENT)
Dept: ULTRASOUND IMAGING | Facility: HOSPITAL | Age: 67
Discharge: HOME | End: 2022-08-02
Admitting: INTERNAL MEDICINE

## 2022-08-02 PROCEDURE — 76775 US EXAM ABDO BACK WALL LIM: CPT

## 2022-08-15 ENCOUNTER — OFFICE VISIT (OUTPATIENT)
Dept: CARDIOLOGY CLINIC | Age: 67
End: 2022-08-15
Payer: MEDICARE

## 2022-08-15 VITALS
HEIGHT: 66 IN | SYSTOLIC BLOOD PRESSURE: 122 MMHG | HEART RATE: 73 BPM | BODY MASS INDEX: 25.71 KG/M2 | DIASTOLIC BLOOD PRESSURE: 76 MMHG | WEIGHT: 160 LBS

## 2022-08-15 DIAGNOSIS — Z95.5 H/O HEART ARTERY STENT: ICD-10-CM

## 2022-08-15 DIAGNOSIS — E78.2 MIXED HYPERLIPIDEMIA: ICD-10-CM

## 2022-08-15 DIAGNOSIS — Z95.0 PACEMAKER: Primary | ICD-10-CM

## 2022-08-15 DIAGNOSIS — I10 ESSENTIAL HYPERTENSION: ICD-10-CM

## 2022-08-15 DIAGNOSIS — I49.5 SINOATRIAL NODE DYSFUNCTION (HCC): ICD-10-CM

## 2022-08-15 DIAGNOSIS — I47.1 PSVT (PAROXYSMAL SUPRAVENTRICULAR TACHYCARDIA) (HCC): ICD-10-CM

## 2022-08-15 DIAGNOSIS — I49.5 SA NODE DYSFUNCTION (HCC): ICD-10-CM

## 2022-08-15 DIAGNOSIS — I25.10 CORONARY ARTERY DISEASE INVOLVING NATIVE CORONARY ARTERY OF NATIVE HEART WITHOUT ANGINA PECTORIS: ICD-10-CM

## 2022-08-15 PROCEDURE — 1123F ACP DISCUSS/DSCN MKR DOCD: CPT | Performed by: INTERNAL MEDICINE

## 2022-08-15 PROCEDURE — G8427 DOCREV CUR MEDS BY ELIG CLIN: HCPCS | Performed by: INTERNAL MEDICINE

## 2022-08-15 PROCEDURE — 99213 OFFICE O/P EST LOW 20 MIN: CPT | Performed by: INTERNAL MEDICINE

## 2022-08-15 PROCEDURE — G8417 CALC BMI ABV UP PARAM F/U: HCPCS | Performed by: INTERNAL MEDICINE

## 2022-08-15 PROCEDURE — 4004F PT TOBACCO SCREEN RCVD TLK: CPT | Performed by: INTERNAL MEDICINE

## 2022-08-15 PROCEDURE — 3017F COLORECTAL CA SCREEN DOC REV: CPT | Performed by: INTERNAL MEDICINE

## 2022-08-15 RX ORDER — ROSUVASTATIN CALCIUM 40 MG/1
40 TABLET, COATED ORAL NIGHTLY
COMMUNITY
Start: 2022-07-23

## 2022-08-15 ASSESSMENT — ENCOUNTER SYMPTOMS
GASTROINTESTINAL NEGATIVE: 1
RESPIRATORY NEGATIVE: 1
VOMITING: 0
SHORTNESS OF BREATH: 0
EYES NEGATIVE: 1
NAUSEA: 0
DIARRHEA: 0

## 2022-08-15 NOTE — PROGRESS NOTES
Pacemaker interrogated  Presenting rhythm:  AP VS, AP 75.7%,  0.1%  [place]attey voltage 2.88 V  Lead status:  Lead impedance within range and stable  Sensing:  P waves 2.0 mV,  R waves 9.7 mV  Thresholds:  Atrial 0.5V @ 0.4ms, ventricular 1.0@ 0.4ms  Observations:  none  Reprogramming for sensitivity and threshold testing  Next [facility] appointment:  11/15/22

## 2022-08-15 NOTE — PROGRESS NOTES
[facility] Progress Note                            Date:  8/15/2022  Patient: Johnathon Babin  Age:  79 y.o., 1955      Reason for evaluation:         SUBJECTIVE:    Returns today follow-up assessment coronary artery disease previous stent hyperlipidemia sinus node dysfunction pacemaker. Device interrogated today functioning appropriately. Denies anginal chest pain denies dyspnea. He does continue to smoke. Recent LDL cholesterol 40. Blood pressure today 122/76 heart 73. Overall feels well. Review of Systems   Constitutional: Negative. Negative for chills, fever and unexpected weight change. HENT: Negative. Eyes: Negative. Respiratory: Negative. Negative for shortness of breath. Cardiovascular: Negative. Negative for chest pain. Gastrointestinal: Negative. Negative for diarrhea, nausea and vomiting. Endocrine: Negative. Genitourinary: Negative. Musculoskeletal: Negative. Skin: Negative. Neurological: Negative. All other systems reviewed and are negative. OBJECTIVE:     /76   Pulse 73   Ht 5' 6\" (1.676 m)   Wt 160 lb (72.6 kg)   BMI 25.82 kg/m²     Labs:   CBC: No results for input(s): WBC, HGB, HCT, PLT in the last 72 hours. BMP:No results for input(s): NA, K, CO2, BUN, CREATININE, LABGLOM, GLUCOSE in the last 72 hours. BNP: No results for input(s): BNP in the last 72 hours. PT/INR: No results for input(s): PROTIME, INR in the last 72 hours. APTT:No results for input(s): APTT in the last 72 hours. CARDIAC ENZYMES:No results for input(s): CKTOTAL, CKMB, CKMBINDEX, TROPONINI in the last 72 hours. FASTING LIPID PANEL:  Lab Results   Component Value Date/Time    HDL 22 02/27/2018 05:12 AM    LDLDIRECT 105 02/23/2013 02:12 AM    LDLCALC 40 02/27/2018 05:12 AM    TRIG 119 02/27/2018 05:12 AM     LIVER PROFILE:No results for input(s): AST, ALT, LABALBU in the last 72 hours.         Past Medical History:   Diagnosis Date    Bradycardia 3/5/13  loop recorder    Cancer (HCC)     Carotid artery stenosis     Chronic kidney disease     Cigarette nicotine dependence in remission 2/21/2018    Hypoglycemia     Mixed hyperlipidemia 2/21/2018    Near syncope     Osteoarthritis     Pacemaker 05/22/2013    loop recorder removed    Recovering alcoholic (HCC)     S/P carotid endarterectomy     right internal carotid    Sinoatrial node dysfunction ([address] 2/15/2018    3/5/13  loop recorder 5/22/2013  Dual chamber PPM    Tobacco abuse      Past Surgical History:   Procedure Laterality Date    CORONARY ANGIOPLASTY WITH STENT PLACEMENT  02/15/2018    SP prox LAD and Osteal diagonal    CORONARY ANGIOPLASTY WITH STENT PLACEMENT  02/26/2018    SP to osteal    PACEMAKER INSERTION  5/22/2013    VASCULAR SURGERY  2-27-13 [facility]    Right Carotid endarterectomy, eversion technique with completion duplex US     Family History   Problem Relation Age of Onset    Diabetes Mother     Heart Disease Father     High Blood Pressure Father     Diabetes Father     Stroke Father      Allergies   Allergen Reactions    Codeine Anaphylaxis     Current Outpatient Medications   Medication Sig Dispense Refill    rosuvastatin (CRESTOR) 40 MG tablet Take 40 mg by mouth at bedtime      escitalopram (LEXAPRO) 10 MG tablet Take 10 mg by mouth daily      Cholecalciferol (VITAMIN D3) 1.25 MG (58787 UT) CAPS Take by mouth      vitamin B-12 (CYANOCOBALAMIN) 1000 MCG tablet Take 1,000 mcg by mouth daily      vitamin D (ERGOCALCIFEROL) 1.25 MG (81528 UT) CAPS capsule Take 50,000 Units by mouth once a week      amLODIPine (NORVASC) 5 MG tablet Take 2.5 mg by mouth 1/2 in am 1/2 in pm      carvedilol (COREG) 25 MG tablet Take 25 mg by mouth 2 times daily (with meals)       ZINC PO Take by mouth      Multiple Vitamins-Minerals (THERAPEUTIC MULTIVITAMIN-MINERALS) tablet Take 1 tablet by mouth daily      aspirin 81 MG chewable tablet Take 1 tablet by mouth daily 30 tablet 3    spironolactone (ALDACTONE) 50 MG ([facility] [address]        3. Mixed hyperlipidemia        4. Coronary artery disease involving native coronary artery of native heart without angina pectoris        5. H/O heart artery stent        6. SA node dysfunction ([address]        7. Essential hypertension        8. PSVT (paroxysmal supraventricular tachycardia) (HCC)            PLAN:  No orders of the defined types were placed in this encounter. No orders of the defined types were placed in this encounter. Continue present medications  Recommend follow-up assessment in 6 months    Return in about 6 months (around 2/15/2023) for return to Dr. Natalie Gamez only. Concepcion Levi MD 8/15/2022 2:43 PM CDT    [facility]      Thisdictation was generated by voice recognition computer software. Although all attempts are made to edit the dictation for accuracy, there may be errors in the transcription that are not intended.

## 2022-09-07 ENCOUNTER — HOSPITAL ENCOUNTER (OUTPATIENT)
Dept: CT IMAGING | Age: 67
Discharge: HOME | End: 2022-09-07
Payer: MEDICARE

## 2022-09-07 DIAGNOSIS — C22.0 LIVER CARCINOMA (HCC): ICD-10-CM

## 2022-09-07 PROCEDURE — 6360000004 HC RX CONTRAST MEDICATION: Performed by: INTERNAL MEDICINE

## 2022-09-07 PROCEDURE — 74170 CT ABD WO CNTRST FLWD CNTRST: CPT | Performed by: RADIOLOGY

## 2022-09-07 PROCEDURE — 74170 CT ABD WO CNTRST FLWD CNTRST: CPT

## 2022-09-07 RX ADMIN — IOPAMIDOL 70 ML: 755 INJECTION, SOLUTION INTRAVENOUS at 11:15

## 2022-09-22 LAB
ALBUMIN SERPL-MCNC: 4.6 G/DL (ref 3.5–5.2)
ALP BLD-CCNC: 66 U/L (ref 40–130)
ALT SERPL-CCNC: 9 U/L (ref 5–41)
AMORPHOUS: ABNORMAL /HPF
ANION GAP SERPL CALCULATED.3IONS-SCNC: 11 MMOL/L (ref 7–19)
AST SERPL-CCNC: 24 U/L (ref 5–40)
BACTERIA: ABNORMAL /HPF
BASOPHILS ABSOLUTE: 0 K/UL (ref 0–0.2)
BASOPHILS RELATIVE PERCENT: 0.6 % (ref 0–1)
BILIRUB SERPL-MCNC: 0.4 MG/DL (ref 0.2–1.2)
BILIRUBIN URINE: NEGATIVE
BLOOD, URINE: ABNORMAL
BUN BLDV-MCNC: 46 MG/DL (ref 8–23)
CALCIUM SERPL-MCNC: 10.1 MG/DL (ref 8.8–10.2)
CHLORIDE BLD-SCNC: 106 MMOL/L (ref 98–111)
CLARITY: CLEAR
CO2: 24 MMOL/L (ref 22–29)
COLOR: ABNORMAL
CREAT SERPL-MCNC: 1.7 MG/DL (ref 0.5–1.2)
CREATININE URINE: 169.8 MG/DL (ref 4.2–622)
EOSINOPHILS ABSOLUTE: 0.3 K/UL (ref 0–0.6)
EOSINOPHILS RELATIVE PERCENT: 3.8 % (ref 0–5)
EPITHELIAL CELLS, UA: ABNORMAL /HPF
GFR AFRICAN AMERICAN: 49
GFR NON-AFRICAN AMERICAN: 40
GLUCOSE BLD-MCNC: 103 MG/DL (ref 74–109)
GLUCOSE URINE: NEGATIVE MG/DL
HCT VFR BLD CALC: 38.3 % (ref 42–52)
HEMOGLOBIN: 12.3 G/DL (ref 14–18)
IMMATURE GRANULOCYTES #: 0 K/UL
KETONES, URINE: ABNORMAL MG/DL
LEUKOCYTE ESTERASE, URINE: ABNORMAL
LYMPHOCYTES ABSOLUTE: 0.9 K/UL (ref 1.1–4.5)
LYMPHOCYTES RELATIVE PERCENT: 14 % (ref 20–40)
MCH RBC QN AUTO: 30.8 PG (ref 27–31)
MCHC RBC AUTO-ENTMCNC: 32.1 G/DL (ref 33–37)
MCV RBC AUTO: 96 FL (ref 80–94)
MONOCYTES ABSOLUTE: 0.7 K/UL (ref 0–0.9)
MONOCYTES RELATIVE PERCENT: 10.3 % (ref 0–10)
NEUTROPHILS ABSOLUTE: 4.6 K/UL (ref 1.5–7.5)
NEUTROPHILS RELATIVE PERCENT: 71 % (ref 50–65)
NITRITE, URINE: NEGATIVE
PDW BLD-RTO: 13.4 % (ref 11.5–14.5)
PH UA: 5 (ref 5–8)
PLATELET # BLD: 118 K/UL (ref 130–400)
PMV BLD AUTO: 9.8 FL (ref 9.4–12.4)
POTASSIUM SERPL-SCNC: 4.8 MMOL/L (ref 3.5–5)
PROTEIN PROTEIN: 109 MG/DL (ref 15–45)
PROTEIN UA: 100 MG/DL
RBC # BLD: 3.99 M/UL (ref 4.7–6.1)
RBC UA: ABNORMAL /HPF (ref 0–2)
SODIUM BLD-SCNC: 141 MMOL/L (ref 136–145)
SPECIFIC GRAVITY UA: 1.02 (ref 1–1.03)
TOTAL PROTEIN: 7.4 G/DL (ref 6.6–8.7)
UROBILINOGEN, URINE: 0.2 E.U./DL
VITAMIN D 25-HYDROXY: 65.3 NG/ML
WBC # BLD: 6.5 K/UL (ref 4.8–10.8)
WBC UA: ABNORMAL /HPF (ref 0–5)

## 2022-10-24 ENCOUNTER — TRANSCRIBE ORDERS (OUTPATIENT)
Dept: ADMINISTRATIVE | Facility: HOSPITAL | Age: 67
End: 2022-10-24

## 2022-10-24 DIAGNOSIS — I70.213 ATHEROSCLEROSIS OF NATIVE ARTERIES OF EXTREMITIES WITH INTERMITTENT CLAUDICATION, BILATERAL LEGS: Primary | ICD-10-CM

## 2022-10-26 LAB
ALBUMIN SERPL-MCNC: 4.4 G/DL (ref 3.5–5.2)
ALP BLD-CCNC: 56 U/L (ref 40–130)
ALT SERPL-CCNC: 10 U/L (ref 5–41)
ANION GAP SERPL CALCULATED.3IONS-SCNC: 11 MMOL/L (ref 7–19)
AST SERPL-CCNC: 28 U/L (ref 5–40)
BACTERIA: NEGATIVE /HPF
BASOPHILS ABSOLUTE: 0 K/UL (ref 0–0.2)
BASOPHILS RELATIVE PERCENT: 0.6 % (ref 0–1)
BILIRUB SERPL-MCNC: 0.5 MG/DL (ref 0.2–1.2)
BILIRUBIN URINE: NEGATIVE
BLOOD, URINE: ABNORMAL
BUN BLDV-MCNC: 27 MG/DL (ref 8–23)
CALCIUM SERPL-MCNC: 9.9 MG/DL (ref 8.8–10.2)
CHLORIDE BLD-SCNC: 105 MMOL/L (ref 98–111)
CLARITY: CLEAR
CO2: 25 MMOL/L (ref 22–29)
COLOR: YELLOW
CREAT SERPL-MCNC: 1.3 MG/DL (ref 0.5–1.2)
CREATININE URINE: 121.4 MG/DL (ref 4.2–622)
CRYSTALS, UA: ABNORMAL /HPF
EOSINOPHILS ABSOLUTE: 0.3 K/UL (ref 0–0.6)
EOSINOPHILS RELATIVE PERCENT: 4.8 % (ref 0–5)
EPITHELIAL CELLS, UA: 3 /HPF (ref 0–5)
GFR SERPL CREATININE-BSD FRML MDRD: 60 ML/MIN/{1.73_M2}
GLUCOSE BLD-MCNC: 94 MG/DL (ref 74–109)
GLUCOSE URINE: NEGATIVE MG/DL
HCT VFR BLD CALC: 33 % (ref 42–52)
HEMOGLOBIN: 10.8 G/DL (ref 14–18)
HYALINE CASTS: 3 /HPF (ref 0–8)
IMMATURE GRANULOCYTES #: 0 K/UL
KETONES, URINE: NEGATIVE MG/DL
LEUKOCYTE ESTERASE, URINE: NEGATIVE
LYMPHOCYTES ABSOLUTE: 1.1 K/UL (ref 1.1–4.5)
LYMPHOCYTES RELATIVE PERCENT: 16.9 % (ref 20–40)
MCH RBC QN AUTO: 30.8 PG (ref 27–31)
MCHC RBC AUTO-ENTMCNC: 32.7 G/DL (ref 33–37)
MCV RBC AUTO: 94 FL (ref 80–94)
MONOCYTES ABSOLUTE: 0.6 K/UL (ref 0–0.9)
MONOCYTES RELATIVE PERCENT: 9.9 % (ref 0–10)
NEUTROPHILS ABSOLUTE: 4.3 K/UL (ref 1.5–7.5)
NEUTROPHILS RELATIVE PERCENT: 67.6 % (ref 50–65)
NITRITE, URINE: NEGATIVE
PDW BLD-RTO: 13.4 % (ref 11.5–14.5)
PH UA: 5 (ref 5–8)
PLATELET # BLD: 122 K/UL (ref 130–400)
PMV BLD AUTO: 9.6 FL (ref 9.4–12.4)
POTASSIUM SERPL-SCNC: 5 MMOL/L (ref 3.5–5)
PROTEIN PROTEIN: 191 MG/DL (ref 15–45)
PROTEIN UA: 300 MG/DL
RBC # BLD: 3.51 M/UL (ref 4.7–6.1)
RBC UA: 5 /HPF (ref 0–4)
SODIUM BLD-SCNC: 141 MMOL/L (ref 136–145)
SPECIFIC GRAVITY UA: 1.01 (ref 1–1.03)
TOTAL PROTEIN: 7.1 G/DL (ref 6.6–8.7)
UROBILINOGEN, URINE: 0.2 E.U./DL
VITAMIN D 25-HYDROXY: 72.7 NG/ML
WBC # BLD: 6.3 K/UL (ref 4.8–10.8)
WBC UA: 16 /HPF (ref 0–5)

## 2022-10-31 ENCOUNTER — APPOINTMENT (OUTPATIENT)
Dept: ULTRASOUND IMAGING | Facility: HOSPITAL | Age: 67
End: 2022-10-31

## 2022-11-16 ENCOUNTER — TELEPHONE (OUTPATIENT)
Dept: CARDIOLOGY CLINIC | Age: 67
End: 2022-11-16

## 2022-11-25 ENCOUNTER — HOSPITAL ENCOUNTER (OUTPATIENT)
Dept: ULTRASOUND IMAGING | Facility: HOSPITAL | Age: 67
Discharge: HOME | End: 2022-11-25
Admitting: INTERNAL MEDICINE

## 2022-11-25 DIAGNOSIS — I70.213 ATHEROSCLEROSIS OF NATIVE ARTERIES OF EXTREMITIES WITH INTERMITTENT CLAUDICATION, BILATERAL LEGS: ICD-10-CM

## 2022-11-25 PROCEDURE — 93923 UPR/LXTR ART STDY 3+ LVLS: CPT | Performed by: SURGERY

## 2022-11-25 PROCEDURE — 93923 UPR/LXTR ART STDY 3+ LVLS: CPT

## 2022-12-14 ENCOUNTER — TELEPHONE (OUTPATIENT)
Dept: CARDIOLOGY CLINIC | Age: 67
End: 2022-12-14

## 2022-12-14 NOTE — TELEPHONE ENCOUNTER
Called and spoke with patient notifying it is time to send in carelink for pacemaker device. Voiced understanding.

## 2022-12-15 ENCOUNTER — TELEPHONE (OUTPATIENT)
Dept: CARDIOLOGY CLINIC | Age: 67
End: 2022-12-15

## 2022-12-15 NOTE — TELEPHONE ENCOUNTER
Patient stated he sent in his carelink and wanted to know if we received it. Reviewed carelink and we did not receive it. Patient has an older carelink monitor [id number]. Gave patient number to call [employer] Get connected at [phone] to discuss home monitor issues.

## 2022-12-21 DIAGNOSIS — I49.5 SINOATRIAL NODE DYSFUNCTION (HCC): ICD-10-CM

## 2022-12-21 DIAGNOSIS — Z95.0 PACEMAKER: Primary | ICD-10-CM

## 2023-01-12 ENCOUNTER — TRANSCRIBE ORDERS (OUTPATIENT)
Dept: ADMINISTRATIVE | Facility: HOSPITAL | Age: 68
End: 2023-01-12
Payer: MEDICARE

## 2023-01-12 DIAGNOSIS — C22.0 LIVER CELL CARCINOMA: Primary | ICD-10-CM

## 2023-01-17 ENCOUNTER — HOSPITAL ENCOUNTER (OUTPATIENT)
Dept: CT IMAGING | Facility: HOSPITAL | Age: 68
End: 2023-01-17
Payer: MEDICARE

## 2023-01-17 ENCOUNTER — HOSPITAL ENCOUNTER (OUTPATIENT)
Dept: CT IMAGING | Facility: HOSPITAL | Age: 68
Discharge: HOME | End: 2023-01-17
Admitting: INTERNAL MEDICINE
Payer: MEDICARE

## 2023-01-17 DIAGNOSIS — C22.0 LIVER CELL CARCINOMA: ICD-10-CM

## 2023-01-17 LAB — CREAT BLDA-MCNC: 2 MG/DL (ref 0.6–1.3)

## 2023-01-17 PROCEDURE — 74178 CT ABD&PLV WO CNTR FLWD CNTR: CPT

## 2023-01-17 PROCEDURE — 25010000002 IOPAMIDOL 61 % SOLUTION: Performed by: INTERNAL MEDICINE

## 2023-01-17 PROCEDURE — 71270 CT THORAX DX C-/C+: CPT

## 2023-01-17 PROCEDURE — 82565 ASSAY OF CREATININE: CPT

## 2023-01-17 RX ADMIN — IOPAMIDOL 100 ML: 612 INJECTION, SOLUTION INTRAVENOUS at 09:24

## 2023-01-23 ENCOUNTER — TRANSCRIBE ORDERS (OUTPATIENT)
Dept: ADMINISTRATIVE | Facility: HOSPITAL | Age: 68
End: 2023-01-23
Payer: MEDICARE

## 2023-01-23 DIAGNOSIS — C38.3 MALIGNANT NEOPLASM OF MEDIASTINUM, PART UNSPECIFIED: Primary | ICD-10-CM

## 2023-01-25 ENCOUNTER — HOSPITAL ENCOUNTER (OUTPATIENT)
Dept: NUCLEAR MEDICINE | Facility: HOSPITAL | Age: 68
Discharge: HOME | End: 2023-01-25
Payer: MEDICARE

## 2023-01-25 DIAGNOSIS — C22.0 LIVER CELL CARCINOMA: ICD-10-CM

## 2023-01-25 PROCEDURE — 78306 BONE IMAGING WHOLE BODY: CPT

## 2023-01-25 PROCEDURE — A9503 TC99M MEDRONATE: HCPCS | Performed by: INTERNAL MEDICINE

## 2023-01-25 PROCEDURE — 0 TECHNETIUM MEDRONATE KIT: Performed by: INTERNAL MEDICINE

## 2023-01-25 RX ORDER — TC 99M MEDRONATE 20 MG/10ML
22.3 INJECTION, POWDER, LYOPHILIZED, FOR SOLUTION INTRAVENOUS
Status: COMPLETED | OUTPATIENT
Start: 2023-01-25 | End: 2023-01-25

## 2023-01-25 RX ADMIN — TECHNETIUM TC 99M MEDRONATE 22.3 MILLICURIE: 25 INJECTION, POWDER, FOR SOLUTION INTRAVENOUS at 09:35

## 2023-02-15 ENCOUNTER — TELEPHONE (OUTPATIENT)
Dept: CARDIOLOGY CLINIC | Age: 68
End: 2023-02-15

## 2023-02-16 ENCOUNTER — TELEPHONE (OUTPATIENT)
Dept: CARDIOLOGY CLINIC | Age: 68
End: 2023-02-16

## 2023-02-16 ENCOUNTER — OFFICE VISIT (OUTPATIENT)
Dept: CARDIOLOGY CLINIC | Age: 68
End: 2023-02-16

## 2023-02-16 ENCOUNTER — APPOINTMENT (OUTPATIENT)
Dept: GENERAL RADIOLOGY | Age: 68
End: 2023-02-16
Payer: MEDICARE

## 2023-02-16 ENCOUNTER — HOSPITAL ENCOUNTER (EMERGENCY)
Age: 68
Discharge: HOME | End: 2023-02-16
Payer: MEDICARE

## 2023-02-16 VITALS
WEIGHT: 162 LBS | OXYGEN SATURATION: 97 % | BODY MASS INDEX: 26.03 KG/M2 | DIASTOLIC BLOOD PRESSURE: 68 MMHG | HEART RATE: 67 BPM | HEIGHT: 66 IN | SYSTOLIC BLOOD PRESSURE: 130 MMHG

## 2023-02-16 VITALS
WEIGHT: 162 LBS | SYSTOLIC BLOOD PRESSURE: 131 MMHG | OXYGEN SATURATION: 98 % | HEIGHT: 66 IN | RESPIRATION RATE: 18 BRPM | DIASTOLIC BLOOD PRESSURE: 80 MMHG | TEMPERATURE: 98 F | BODY MASS INDEX: 26.03 KG/M2 | HEART RATE: 72 BPM

## 2023-02-16 DIAGNOSIS — I49.5 SA NODE DYSFUNCTION (HCC): ICD-10-CM

## 2023-02-16 DIAGNOSIS — E78.5 DYSLIPIDEMIA: ICD-10-CM

## 2023-02-16 DIAGNOSIS — N18.32 STAGE 3B CHRONIC KIDNEY DISEASE (HCC): ICD-10-CM

## 2023-02-16 DIAGNOSIS — E87.5 HYPERKALEMIA: Primary | ICD-10-CM

## 2023-02-16 DIAGNOSIS — I25.10 CORONARY ARTERY DISEASE INVOLVING NATIVE CORONARY ARTERY OF NATIVE HEART WITHOUT ANGINA PECTORIS: Primary | ICD-10-CM

## 2023-02-16 DIAGNOSIS — I25.10 CORONARY ARTERY DISEASE INVOLVING NATIVE CORONARY ARTERY OF NATIVE HEART WITHOUT ANGINA PECTORIS: ICD-10-CM

## 2023-02-16 LAB
ALBUMIN SERPL-MCNC: 4.1 G/DL (ref 3.5–5.2)
ALBUMIN SERPL-MCNC: 4.2 G/DL (ref 3.5–5.2)
ALP BLD-CCNC: 72 U/L (ref 40–130)
ALP BLD-CCNC: 73 U/L (ref 40–130)
ALT SERPL-CCNC: 7 U/L (ref 5–41)
ALT SERPL-CCNC: 8 U/L (ref 5–41)
ANION GAP SERPL CALCULATED.3IONS-SCNC: 11 MMOL/L (ref 7–19)
ANION GAP SERPL CALCULATED.3IONS-SCNC: 12 MMOL/L (ref 7–19)
AST SERPL-CCNC: 21 U/L (ref 5–40)
AST SERPL-CCNC: 23 U/L (ref 5–40)
BASOPHILS ABSOLUTE: 0 K/UL (ref 0–0.2)
BASOPHILS ABSOLUTE: 0.1 K/UL (ref 0–0.2)
BASOPHILS RELATIVE PERCENT: 0.4 % (ref 0–1)
BASOPHILS RELATIVE PERCENT: 0.4 % (ref 0–1)
BILIRUB SERPL-MCNC: 0.4 MG/DL (ref 0.2–1.2)
BILIRUB SERPL-MCNC: 0.4 MG/DL (ref 0.2–1.2)
BUN BLDV-MCNC: 29 MG/DL (ref 8–23)
BUN BLDV-MCNC: 30 MG/DL (ref 8–23)
CALCIUM SERPL-MCNC: 9.9 MG/DL (ref 8.8–10.2)
CALCIUM SERPL-MCNC: 9.9 MG/DL (ref 8.8–10.2)
CHLORIDE BLD-SCNC: 103 MMOL/L (ref 98–111)
CHLORIDE BLD-SCNC: 99 MMOL/L (ref 98–111)
CO2: 25 MMOL/L (ref 22–29)
CO2: 25 MMOL/L (ref 22–29)
CREAT SERPL-MCNC: 1.8 MG/DL (ref 0.5–1.2)
CREAT SERPL-MCNC: 1.8 MG/DL (ref 0.5–1.2)
EOSINOPHILS ABSOLUTE: 0.4 K/UL (ref 0–0.6)
EOSINOPHILS ABSOLUTE: 0.5 K/UL (ref 0–0.6)
EOSINOPHILS RELATIVE PERCENT: 4.1 % (ref 0–5)
EOSINOPHILS RELATIVE PERCENT: 4.3 % (ref 0–5)
GFR SERPL CREATININE-BSD FRML MDRD: 40 ML/MIN/{1.73_M2}
GFR SERPL CREATININE-BSD FRML MDRD: 40 ML/MIN/{1.73_M2}
GLUCOSE BLD-MCNC: 110 MG/DL (ref 74–109)
GLUCOSE BLD-MCNC: 112 MG/DL (ref 74–109)
HCT VFR BLD CALC: 35.2 % (ref 42–52)
HCT VFR BLD CALC: 35.5 % (ref 42–52)
HEMOGLOBIN: 11.3 G/DL (ref 14–18)
HEMOGLOBIN: 11.6 G/DL (ref 14–18)
IMMATURE GRANULOCYTES #: 0 K/UL
IMMATURE GRANULOCYTES #: 0.1 K/UL
LYMPHOCYTES ABSOLUTE: 0.8 K/UL (ref 1.1–4.5)
LYMPHOCYTES ABSOLUTE: 0.9 K/UL (ref 1.1–4.5)
LYMPHOCYTES RELATIVE PERCENT: 8.1 % (ref 20–40)
LYMPHOCYTES RELATIVE PERCENT: 8.2 % (ref 20–40)
MCH RBC QN AUTO: 29.1 PG (ref 27–31)
MCH RBC QN AUTO: 29.6 PG (ref 27–31)
MCHC RBC AUTO-ENTMCNC: 31.8 G/DL (ref 33–37)
MCHC RBC AUTO-ENTMCNC: 33 G/DL (ref 33–37)
MCV RBC AUTO: 89.8 FL (ref 80–94)
MCV RBC AUTO: 91.5 FL (ref 80–94)
MONOCYTES ABSOLUTE: 0.6 K/UL (ref 0–0.9)
MONOCYTES ABSOLUTE: 0.8 K/UL (ref 0–0.9)
MONOCYTES RELATIVE PERCENT: 5.9 % (ref 0–10)
MONOCYTES RELATIVE PERCENT: 7.2 % (ref 0–10)
NEUTROPHILS ABSOLUTE: 8 K/UL (ref 1.5–7.5)
NEUTROPHILS ABSOLUTE: 9 K/UL (ref 1.5–7.5)
NEUTROPHILS RELATIVE PERCENT: 79.5 % (ref 50–65)
NEUTROPHILS RELATIVE PERCENT: 81 % (ref 50–65)
PDW BLD-RTO: 14 % (ref 11.5–14.5)
PDW BLD-RTO: 14.1 % (ref 11.5–14.5)
PLATELET # BLD: 155 K/UL (ref 130–400)
PLATELET # BLD: 157 K/UL (ref 130–400)
PMV BLD AUTO: 9.3 FL (ref 9.4–12.4)
PMV BLD AUTO: 9.9 FL (ref 9.4–12.4)
POTASSIUM SERPL-SCNC: 4.4 MMOL/L (ref 3.5–5)
POTASSIUM SERPL-SCNC: 4.5 MMOL/L (ref 3.5–5)
RBC # BLD: 3.88 M/UL (ref 4.7–6.1)
RBC # BLD: 3.92 M/UL (ref 4.7–6.1)
SODIUM BLD-SCNC: 136 MMOL/L (ref 136–145)
SODIUM BLD-SCNC: 139 MMOL/L (ref 136–145)
TOTAL PROTEIN: 7.6 G/DL (ref 6.6–8.7)
TOTAL PROTEIN: 7.7 G/DL (ref 6.6–8.7)
TROPONIN: <0.01 NG/ML (ref 0–0.03)
WBC # BLD: 11.3 K/UL (ref 4.8–10.8)
WBC # BLD: 9.8 K/UL (ref 4.8–10.8)

## 2023-02-16 PROCEDURE — 85025 COMPLETE CBC W/AUTO DIFF WBC: CPT

## 2023-02-16 PROCEDURE — 93005 ELECTROCARDIOGRAM TRACING: CPT | Performed by: PHYSICIAN ASSISTANT

## 2023-02-16 PROCEDURE — 71045 X-RAY EXAM CHEST 1 VIEW: CPT

## 2023-02-16 PROCEDURE — 99285 EMERGENCY DEPT VISIT HI MDM: CPT

## 2023-02-16 PROCEDURE — 80053 COMPREHEN METABOLIC PANEL: CPT

## 2023-02-16 PROCEDURE — 36415 COLL VENOUS BLD VENIPUNCTURE: CPT

## 2023-02-16 PROCEDURE — 84484 ASSAY OF TROPONIN QUANT: CPT

## 2023-02-16 ASSESSMENT — ENCOUNTER SYMPTOMS
SHORTNESS OF BREATH: 0
RHINORRHEA: 0
CHEST TIGHTNESS: 0
VOMITING: 0
WHEEZING: 0
NAUSEA: 0
COLOR CHANGE: 0
COUGH: 0
BACK PAIN: 0
SHORTNESS OF BREATH: 0
COUGH: 0
VOICE CHANGE: 0
PHOTOPHOBIA: 0
SORE THROAT: 0

## 2023-02-16 ASSESSMENT — PAIN - FUNCTIONAL ASSESSMENT: PAIN_FUNCTIONAL_ASSESSMENT: NONE - DENIES PAIN

## 2023-02-16 NOTE — TELEPHONE ENCOUNTER
Called and spoke with Libby AGUILAR in regards to patients labs. She stated after receiving labs from his PCP we should go ahead and sent him to the ER due to the abnormalities. Called patient to let him know what Reyna Woodall said and told him he should go ahead on to the ER. He voiced understanding and stated he would head that way.

## 2023-02-16 NOTE — PATIENT INSTRUCTIONS
[place] at the [facility] and [address] [facility] located on the first floor of [place] [address] through hospital main entrance and turn immediately to your left. Patient's contact number:  [phone] (home)      Lexiscan Stress Test      Lexiscan (regadenoson injection) is a prescription drug given through an IV line that increases blood flow through the arteries of the heart during a cardiac nuclear stress test.     There are two parts to a Lexiscan stress test: the rest portion and the exercise portion. For the rest portion, a radioactive tracer is injected into your arm through the IV. After 30 to 60 minutes, the process of imaging will begin. A nuclear camera will be placed on your chest area and images are taken for the next 15 to 20 minutes. For the exercise portion, a nurse will attach EKG electrodes to your chest to monitor your heart rate. The drug Tran Boer is administered to simulate stress on the heart. Your heart rhythm will then be monitored for the next few minutes. Your blood pressure will also be monitored throughout the exercise portion. [place] through the exercise portion, a second round of radioactive tracer is injected into your body. Your heart rate and EKG will be monitored for another few minutes after administering the drug. Test Preparation:    Bring a list of your current medications. Do not take any of your medications the morning of the test, but bring all morning medications with you as you will take them after the stress portion of the test is completed. Do not eat Bananas 24 hours prior to test.    No caffeine 24 hours prior to the testing. This includes: coffee, pop/soda, chocolate, cold medications, etc.  Any product that might contain caffeine. No nicotine or alcohol 12 hours prior to your test.   Nothing to eat or drink 6-8 hours prior to appointment time.   It is okay to drink small amounts of water during the four hours prior to the test.  Nitroglycerin patches must be taken off 1 hour before testing. Wear comfortable clothing. Please refrain from any strenuous exercise or activities the day before your test, or the day of your test.  The Nuclear Lexiscan Stress test takes about 2 ½ to 3 hours to complete. If for any reason you are unable to keep this appointment, please contact Outpatient Scheduling, [phone], as soon as possible to reschedule.

## 2023-02-16 NOTE — PROGRESS NOTES
[id number] [facility] Cardiology   Established Patient Office Visit   [facility]. [address] [facility]  [phone]        OFFICE VISIT:  2023    Michelle Gillis - : 1955    Reason For Visit:  Katie Torres is a 79 y.o. male who is here for Follow-up    1. Coronary artery disease involving native coronary artery of native heart without angina pectoris    2. SA node dysfunction ([address]    3. Dyslipidemia      Patient with a history of coronary artery disease with previous stent placement, hyperlipidemia, SA node dysfunction/pacemaker. He is a patient of Dr. Jeniffer Chang. Patient presents to clinic today for 6-month follow-up. States he has been having some chest discomfort periodically couple times a week. Does not last for very long maybe a minute or 2 feels more like a stabbing sensation. He denies any shortness of breath, orthopnea or PND. Patient denies any lightheadedness, dizziness or syncope. Patient states he had some blood work drawn last week by his primary care provider Dr. Mary Garcia and that his office contacted him regarding some abnormal lab value. He is unsure what it is he thinks it might be something to do with his kidney function. We have contacted his primary care's office to have them fax over the lab results that the patient is referring to. They have not done that yet we will go ahead and draw a CMP and CBC to see what current lab values are. .        Subjective    Michelle Gillis is a 79 y.o. male with the following history as recorded in [facility]:    Patient Active Problem List    Diagnosis Date Noted    H/O heart artery stent 04/15/2019    Chest pain 2018    Mixed hyperlipidemia 2018    Cigarette nicotine dependence in remission 2018    CAD (coronary artery disease) 02/15/2018    Sinoatrial node dysfunction ([address] 02/15/2018    Tobacco abuse     Osteoarthritis     Carotid artery stenosis     Pacemaker 2013     Current Outpatient Medications   Medication Sig Dispense Refill    rosuvastatin (CRESTOR) 40 MG tablet Take 40 mg by mouth at bedtime      escitalopram (LEXAPRO) 10 MG tablet Take 10 mg by mouth daily      vitamin B-12 (CYANOCOBALAMIN) 1000 MCG tablet Take 1,000 mcg by mouth daily      vitamin D (ERGOCALCIFEROL) 1.25 MG (99966 UT) CAPS capsule Take 50,000 Units by mouth once a week      amLODIPine (NORVASC) 5 MG tablet Take 2.5 mg by mouth 1/2 in am 1/2 in pm      carvedilol (COREG) 25 MG tablet Take 25 mg by mouth 2 times daily (with meals)       ZINC PO Take by mouth      Multiple Vitamins-Minerals (THERAPEUTIC MULTIVITAMIN-MINERALS) tablet Take 1 tablet by mouth daily      aspirin 81 MG chewable tablet Take 1 tablet by mouth daily 30 tablet 3     No current facility-administered medications for this visit.      Allergies: Codeine  Past Medical History:   Diagnosis Date    Bradycardia     3/5/13  loop recorder    Cancer (HCC)     Carotid artery stenosis     Chronic kidney disease     Cigarette nicotine dependence in remission 2/21/2018    Hypoglycemia     Mixed hyperlipidemia 2/21/2018    Near syncope     Osteoarthritis     Pacemaker 05/22/2013    loop recorder removed    Recovering alcoholic ([facility] [address]     S/P carotid endarterectomy     right internal carotid    Sinoatrial node dysfunction ([facility] [address] 2/15/2018    3/5/13  loop recorder 5/22/2013  Dual chamber PPM    Tobacco abuse      Past Surgical History:   Procedure Laterality Date    CORONARY ANGIOPLASTY WITH STENT PLACEMENT  02/15/2018    SP prox LAD and Osteal diagonal    CORONARY ANGIOPLASTY WITH STENT PLACEMENT  02/26/2018    SP to osteal    PACEMAKER INSERTION  5/22/2013    VASCULAR SURGERY  2-27-13 [facility]    Right Carotid endarterectomy, eversion technique with completion duplex US     Family History   Problem Relation Age of Onset    Diabetes Mother     Heart Disease Father     High Blood Pressure Father     Diabetes Father     Stroke Father      Social History     Tobacco Use    Smoking status: Every Day Packs/day: 0.25     Types: Cigarettes    Smokeless tobacco: Former     Types: Chew   Substance Use Topics    Alcohol use: No     Alcohol/week: 0.0 standard drinks          Review of Systems:    Review of Systems   Constitutional:  Negative for activity change, chills, diaphoresis, fatigue and fever. HENT:  Negative for congestion and sore throat. Respiratory:  Negative for cough, chest tightness, shortness of breath and wheezing. Cardiovascular:  Negative for chest pain, palpitations and leg swelling. Neurological:  Negative for dizziness, syncope, light-headedness and headaches. Psychiatric/Behavioral:  Negative for confusion. The patient is not nervous/anxious. Objective:    /68   Pulse 67   Ht 5' 6\" (1.676 m)   Wt 162 lb (73.5 kg)   SpO2 97%   BMI 26.15 kg/m²     GENERAL - well developed and well nourished, conversant  HEENT -  PERRLA, Hearing appears normal, gentleman lids are normal.  External inspection of ears and nose appear normal.  NECK - no thyromegaly, no JVD, trachea is in the midline  CARDIOVASCULAR - PMI is in the mid line clavicular position, Normal S1 and S2 with no systolic murmur. No S3 or S4    PULMONARY - no respiratory distress. No wheezes or rales. Lungs are clear to ausculation, normal respiratory effort. ABDOMEN  - soft, non tender, no rebound  MUSCULOSKELETAL  - range of motion of the upper and lower extermites appears normal and equal and is without pain   EXTREMITIES - no significant edema   NEUROLOGIC - gait and station are normal  SKIN - turgor is normal, can warm and dry.   PSYCHIATRIC - normal mood and affect, alert and orientated x 3,      ASSESSMENT:    ALL THE CARDIOLOGY PROBLEMS ARE LISTED ABOVE; HOWEVER, THE FOLLOWING SPECIFIC CARDIAC PROBLEMS / CONDITIONS WERE ADDRESSED AND TREATED DURING THE OFFICE VISIT TODAY:                                                                                            MEDICAL DECISION MAKING             Cardiac Specific Problem / Diagnosis  Discussion and Data Reviewed Diagnostic Procedures Ordered Management Options Selected           1. CAD  Shows no change   Review and summation of old records:    Patient having some chest discomfort. It has been over 5 years since patient has had an ischemic study and stent placement. Given his symptoms of some chest discomfort we will go ahead and order a Lexiscan. Patient is on aspirin, Coreg and Crestor. Yes Continue current medications:     Yes           2. SA node dysfunction/pacemaker  Stable   Interrogation in the office today battery status good. Lead impedances stable. Thresholds stable. No observations. CareLink 3 months. Yes Continue current medications:    Yes           3. Dyslipidemia Stable Patient is on Crestor 40 mg nightly. No Continue current medications:       Yes           4. Hypertension Stable Blood pressure in the office today 130/68. O2 sat 97%. Patient is on Norvasc 2.5 mg daily. Coreg 25 mg twice daily. No Continue current medications:       Yes     Orders Placed This Encounter   Procedures    NM MYOCARDIAL SPECT REST EXERCISE OR RX     Order Specific Question:   Reason for Exam?     Answer:   Chest pain     Order Specific Question:   Procedure Type     Answer:   Rx    Comprehensive Metabolic Panel     Standing Status:   Future     Standing Expiration Date:   2/16/2024    CBC with Auto Differential     Standing Status:   Future     Standing Expiration Date:   2/16/2024       No orders of the defined types were placed in this encounter. Discussed with patient. Return in about 2 weeks (around 3/2/2023) for Dr Stephanie Sarmiento . I greatly appreciate the opportunity to meet Angelo Marrufo and your confidence in allowing me to participate in his cardiovascular care. JEFF Puente - NP  2/16/2023 1:37 PM CST                    This dictation was generated by voice recognition computer software.  Although all attempts are made to edit dictation for accuracy, there may be errors in the transcription that are not intended.

## 2023-02-17 ENCOUNTER — HOSPITAL ENCOUNTER (OUTPATIENT)
Dept: NUCLEAR MEDICINE | Age: 68
Discharge: HOME | End: 2023-02-19
Payer: MEDICARE

## 2023-02-17 DIAGNOSIS — I25.10 CORONARY ARTERY DISEASE INVOLVING NATIVE CORONARY ARTERY OF NATIVE HEART WITHOUT ANGINA PECTORIS: ICD-10-CM

## 2023-02-17 LAB
LV EF: 54 %
LVEF MODALITY: NORMAL

## 2023-02-17 PROCEDURE — 3430000000 HC RX DIAGNOSTIC RADIOPHARMACEUTICAL: Performed by: NURSE PRACTITIONER

## 2023-02-17 PROCEDURE — 78452 HT MUSCLE IMAGE SPECT MULT: CPT

## 2023-02-17 PROCEDURE — 78452 HT MUSCLE IMAGE SPECT MULT: CPT | Performed by: INTERNAL MEDICINE

## 2023-02-17 PROCEDURE — 6360000002 HC RX W HCPCS: Performed by: NURSE PRACTITIONER

## 2023-02-17 PROCEDURE — 93016 CV STRESS TEST SUPVJ ONLY: CPT | Performed by: INTERNAL MEDICINE

## 2023-02-17 PROCEDURE — A9502 TC99M TETROFOSMIN: HCPCS | Performed by: NURSE PRACTITIONER

## 2023-02-17 PROCEDURE — 93018 CV STRESS TEST I&R ONLY: CPT | Performed by: INTERNAL MEDICINE

## 2023-02-17 RX ADMIN — TETROFOSMIN 8 MILLICURIE: 1.38 INJECTION, POWDER, LYOPHILIZED, FOR SOLUTION INTRAVENOUS at 09:20

## 2023-02-17 RX ADMIN — TETROFOSMIN 24 MILLICURIE: 1.38 INJECTION, POWDER, LYOPHILIZED, FOR SOLUTION INTRAVENOUS at 11:20

## 2023-02-17 RX ADMIN — REGADENOSON 0.4 MG: 0.08 INJECTION, SOLUTION INTRAVENOUS at 10:04

## 2023-02-17 NOTE — ED PROVIDER NOTES
[facility] EMERGENCY DEPT  EMERGENCY DEPARTMENT ENCOUNTER      Pt Name: Mandie Ashraf  MRN: [id number]  [place] 1955  Date of evaluation: 9/63/2526  Provider: JEFF Delong    CHIEF COMPLAINT       Chief Complaint   Patient presents with    Abnormal Lab     K+ has been very high since last friday         HISTORY OF PRESENT ILLNESS   (Location/Symptom, Timing/Onset, Context/Setting, Quality, Duration, Modifying Factors, Severity)  Note limiting factors. Mandie Ashraf is a 79 y.o. male who presents to the emergency department for hyperkalemia. He was called by his cardiology office today and told to come to the ED for potassium of 5.8. however, this lab result was faxed into their office from Dr Freda Kraus office and was drawn on 2/10. He had CMP rechecked and potassium was 4.4 but this had not yet resulted when cardiology recommended he come to ED. Patient denies complaints today. He denies chest pain or SOB. He had pacer interrogated today and it was normal. He is having an ECHO done tomorrow and will see Dr Jc Harrison back in 2 weeks. HPI    Nursing Notes were reviewed. REVIEW OF SYSTEMS    (2-9 systems for level 4, 10 or more for level 5)     Review of Systems   Constitutional:  Negative for chills and fever. HENT:  Negative for ear pain, hearing loss, rhinorrhea and voice change. Eyes:  Negative for photophobia and visual disturbance. Respiratory:  Negative for cough and shortness of breath. Cardiovascular:  Negative for chest pain and palpitations. Gastrointestinal:  Negative for nausea and vomiting. Endocrine: Negative. Negative for cold intolerance and heat intolerance. Genitourinary:  Negative for difficulty urinating and flank pain. Musculoskeletal:  Negative for back pain and neck pain. Skin:  Negative for color change and rash. Allergic/Immunologic: Negative for environmental allergies and food allergies.    Neurological:  Negative for dizziness, speech difficulty and headaches. Hematological:  Does not bruise/bleed easily. Psychiatric/Behavioral:  Negative for sleep disturbance and suicidal ideas. Except as noted above the remainder of the review of systems was reviewed and negative.        PAST MEDICAL HISTORY     Past Medical History:   Diagnosis Date    Bradycardia     3/5/13  loop recorder    Cancer ([facility] [address]     Carotid artery stenosis     Chronic kidney disease     Cigarette nicotine dependence in remission 2/21/2018    Hypoglycemia     Mixed hyperlipidemia 2/21/2018    Near syncope     Osteoarthritis     Pacemaker 05/22/2013    loop recorder removed    Recovering alcoholic ([facility] [address]     S/P carotid endarterectomy     right internal carotid    Sinoatrial node dysfunction ([facility] [address] 2/15/2018    3/5/13  loop recorder 5/22/2013  Dual chamber PPM    Tobacco abuse          SURGICAL HISTORY       Past Surgical History:   Procedure Laterality Date    CORONARY ANGIOPLASTY WITH STENT PLACEMENT  02/15/2018    SP prox LAD and Osteal diagonal    CORONARY ANGIOPLASTY WITH STENT PLACEMENT  02/26/2018    SP to osteal    PACEMAKER INSERTION  5/22/2013    VASCULAR SURGERY  2-27-13 [facility]    Right Carotid endarterectomy, eversion technique with completion duplex US         CURRENT MEDICATIONS       Previous Medications    AMLODIPINE (NORVASC) 5 MG TABLET    Take 2.5 mg by mouth 1/2 in am 1/2 in pm    ASPIRIN 81 MG CHEWABLE TABLET    Take 1 tablet by mouth daily    CARVEDILOL (COREG) 25 MG TABLET    Take 25 mg by mouth 2 times daily (with meals)     ESCITALOPRAM (LEXAPRO) 10 MG TABLET    Take 10 mg by mouth daily    MULTIPLE VITAMINS-MINERALS (THERAPEUTIC MULTIVITAMIN-MINERALS) TABLET    Take 1 tablet by mouth daily    ROSUVASTATIN (CRESTOR) 40 MG TABLET    Take 40 mg by mouth at bedtime    VITAMIN B-12 (CYANOCOBALAMIN) 1000 MCG TABLET    Take 1,000 mcg by mouth daily    VITAMIN D (ERGOCALCIFEROL) 1.25 MG (93515 UT) CAPS CAPSULE    Take 50,000 Units by mouth once a week    ZINC PO    Take by mouth       ALLERGIES     Codeine    FAMILY HISTORY       Family History   Problem Relation Age of Onset    Diabetes Mother     Heart Disease Father     High Blood Pressure Father     Diabetes Father     Stroke Father           SOCIAL HISTORY       Social History     Socioeconomic History    Marital status:      Spouse name: None    Number of children: None    Years of education: None    Highest education level: None   Tobacco Use    Smoking status: Every Day     Packs/day: 1.00     Types: Cigarettes    Smokeless tobacco: Former     Types: Chew   Vaping Use    Vaping Use: Never used   Substance and Sexual Activity    Alcohol use: No     Alcohol/week: 0.0 standard drinks    Drug use: No     Comment: Past Use    Sexual activity: Never     Partners: Female       SCREENINGS         [place] Coma Scale  Eye Opening: Spontaneous  Best Verbal Response: Oriented  Best Motor Response: Obeys commands  Ema Coma Scale Score: 15                     CIWA Assessment  BP: 131/80  Heart Rate: 72                 PHYSICAL EXAM    (up to 7 for level 4, 8 or more for level 5)     ED Triage Vitals [02/16/23 1614]   BP Temp Temp src Heart Rate Resp SpO2 Height Weight   131/80 98 °F (36.7 °C) -- 72 18 98 % 5' 6\" (1.676 m) 162 lb (73.5 kg)       Physical Exam  Vitals and nursing note reviewed. Constitutional:       Appearance: Normal appearance. HENT:      Head: Atraumatic. Mouth/Throat:      Mouth: Mucous membranes are not dry. Pharynx: No posterior oropharyngeal erythema. Eyes:      General: No scleral icterus. Pupils: Pupils are equal, round, and reactive to light. Neck:      Trachea: No tracheal deviation. Cardiovascular:      Rate and Rhythm: Normal rate and regular rhythm. Heart sounds: Normal heart sounds. No murmur heard. Pulmonary:      Effort: Pulmonary effort is normal. No respiratory distress. Breath sounds: Normal breath sounds. No stridor.    Abdominal:      General: There is no distension. Palpations: Abdomen is soft. Tenderness: There is no abdominal tenderness. There is no guarding. Skin:     Coloration: Skin is not pale. Findings: No rash. Neurological:      Mental Status: He is alert and oriented to person, place, and time. Psychiatric:         Behavior: Behavior is cooperative. DIAGNOSTIC RESULTS     EKG: All EKG's are interpreted by the Emergency Department Physician who either signs or Co-signs this chart in the absence of a cardiologist.        RADIOLOGY:   Non-plain film images such as CT, Ultrasound and MRI are read by the radiologist. Plain radiographic images are visualized and preliminarily interpreted by the emergency physician with the below findings:        Interpretation per the Radiologist below, if available at the time of this note:    XR CHEST PORTABLE   Final Result   No radiographic evidence of acute cardiopulmonary process. ED BEDSIDE ULTRASOUND:   Performed by ED Physician - none    LABS:  Labs Reviewed   COMPREHENSIVE METABOLIC PANEL - Abnormal; Notable for the following components:       Result Value    Glucose 110 (*)     BUN 30 (*)     Creatinine 1.8 (*)     Est, Glom Filt Rate 40 (*)     All other components within normal limits   CBC WITH AUTO DIFFERENTIAL - Abnormal; Notable for the following components:    WBC 11.3 (*)     RBC 3.92 (*)     Hemoglobin 11.6 (*)     Hematocrit 35.2 (*)     MPV 9.3 (*)     Neutrophils % 79.5 (*)     Lymphocytes % 8.2 (*)     Neutrophils Absolute 9.0 (*)     Lymphocytes Absolute 0.9 (*)     All other components within normal limits   TROPONIN       All other labs were within normal range or not returned as of this dictation.     EMERGENCY DEPARTMENT COURSE and DIFFERENTIAL DIAGNOSIS/MDM:   Vitals:    Vitals:    02/16/23 1614   BP: 131/80   Pulse: 72   Resp: 18   Temp: 98 °F (36.7 °C)   SpO2: 98%   Weight: 162 lb (73.5 kg)   Height: 5' 6\" (1.676 m)           Medical Decision Making  Potassium rechecked in ED and was 4.5. EKG unchanged. His renal function actually improved from lab draw at Dr Maeve Ngo last week. Creatinine 1.8 compared to 2 and GFR is 40 compared to 35. He sees Dr Diana Bustos for CKD and has routine follow-up scheduled. Patient is in no distress tonight and would like to go home. He is stable for discharge. Amount and/or Complexity of Data Reviewed  External Data Reviewed: labs and ECG. Labs: ordered. Decision-making details documented in ED Course. Radiology: ordered. Decision-making details documented in ED Course. ECG/medicine tests: ordered. Decision-making details documented in ED Course. REASSESSMENT          CRITICAL CARE TIME   Total Critical Care time was 0 minutes, excluding separately reportable procedures. There was a high probability of clinically significant/life threatening deterioration in the patient's condition which required my urgent intervention. CONSULTS:  None    PROCEDURES:  Unless otherwise noted below, none     Procedures        FINAL IMPRESSION      1. Hyperkalemia    2. Stage 3b chronic kidney disease [facility])          DISPOSITION/PLAN   DISPOSITION Decision To Discharge 02/16/2023 07:00:55 PM      PATIENT REFERRED TO:  DO LUZMARIA Lacy [address]    Schedule an appointment as soon as possible for a visit in 1 week      DISCHARGE MEDICATIONS:  New Prescriptions    No medications on file     Controlled Substances Monitoring:     RX Monitoring 12/27/2020   Attestation The Prescription Monitoring Report for this patient was reviewed today. Periodic Controlled Substance Monitoring Possible medication side effects, risk of tolerance/dependence & alternative treatments discussed. ;No signs of potential drug abuse or diversion identified.        (Please note that portions of this note were completed with a voice recognition program.  Efforts were made to edit the dictations but occasionally words are mis-transcribed.)    Blake Signs, APRN (electronically signed)  Attending Emergency Physician            Blake Israel, APRN  02/16/23 [id number]

## 2023-02-17 NOTE — DISCHARGE INSTR - COC
Continuity of Care Form    Patient Name: Michelle Gillis   :  1955  MRN:  [id number]    Admit date:  2023  Discharge date:  ***    Code Status Order: Prior   Advance Directives:     Admitting Physician:  No admitting provider for patient encounter. PCP: Edwardo Maxwell DO    Discharging Nurse: [facility] Unit/Room#: PAULETTE [address]  Discharging Unit Phone Number: ***    Emergency Contact:   Extended Emergency Contact Information  Primary Emergency Contact: Jamison Chi [address] Phone: [phone]  Mobile Phone: [phone]  Relation: Child  Secondary Emergency Contact: dahlia caballeroquita  Mobile Phone: [phone]  Relation: Brother/Sister   needed?  No    Past Surgical History:  Past Surgical History:   Procedure Laterality Date    CORONARY ANGIOPLASTY WITH STENT PLACEMENT  02/15/2018    SP prox LAD and Osteal diagonal    CORONARY ANGIOPLASTY WITH STENT PLACEMENT  2018    SP to osteal    PACEMAKER INSERTION  2013    VASCULAR SURGERY  13 [facility]    Right Carotid endarterectomy, eversion technique with completion duplex US       Immunization History:   Immunization History   Administered Date(s) Administered    Influenza, FLUARIX, FLULAVAL, Coretha Bloch (age 10 mo+) AND AFLURIA, (age 1 y+), PF, 0.5mL 2018       Active Problems:  Patient Active Problem List   Diagnosis Code    Osteoarthritis M19.90    Carotid artery stenosis I65.29    Tobacco abuse Z72.0    CAD (coronary artery disease) I25.10    Sinoatrial node dysfunction (HCC) I49.5    Mixed hyperlipidemia E78.2    Cigarette nicotine dependence in remission F17.211    Chest pain R07.9    Pacemaker Z95.0    H/O heart artery stent Z95.5       Isolation/Infection:   Isolation            No Isolation          Patient Infection Status       Infection Onset Added Last Indicated Last Indicated By Review Planned Expiration Resolved Resolved By    [facility] 02/15/18 02/16/18 02/16/18 Pooja Velazco RN 02/16/18       2/15/2018 MRSA Screening culture (nares): Positive for MRSA            Nurse Assessment:  Last Vital Signs: /80   Pulse 72   Temp 98 °F (36.7 °C)   Resp 18   Ht 5' 6\" (1.676 m)   Wt 162 lb (73.5 kg)   SpO2 98%   BMI 26.15 kg/m²     Last documented pain score (0-10 scale):    Last Weight:   Wt Readings from Last 1 Encounters:   23 162 lb (73.5 kg)     Mental Status:  {IP PT MENTAL STATUS:80530}    IV Access:  { LAILA IV ACCESS:535249056}    Nursing Mobility/ADLs:  Walking   {P DME KFWE:089246797}  Transfer  {P DME AAUW:666380473}  Bathing  {P DME NNZR:702572229}  Dressing  {P DME YAKM:468015950}  Toileting  {P DME ZSEJ:827654363}  Feeding  {[facility] DME TABF:779170269}  Med Admin  {[facility] DME NDCW:984267567}  Med Delivery   { LAILA MED Delivery:966983818}    Wound Care Documentation and Therapy:        Elimination:  Continence: Bowel: {YES / IK:81837}  Bladder: {YES / YL:63228}  Urinary Catheter: {Urinary Catheter:338310155}   Colostomy/Ileostomy/Ileal Conduit: {YES / EH:04069}       Date of Last BM: ***  No intake or output data in the 24 hours ending 23 1902  No intake/output data recorded.     Safety Concerns:     [employer] Safety Concerns:153436499}    Impairments/Disabilities:      [employer] Impairments/Disabilities:857055111}    Nutrition Therapy:  Current Nutrition Therapy:   [employer] Diet List:010760794}    Routes of Feeding: {[facility] DME Other Feedings:490698634}  Liquids: {Slp liquid thickness:05258}  Daily Fluid Restriction: {CHP DME Yes amt example:234027414}  Last Modified Barium Swallow with Video (Video Swallowing Test): {Done Not Done KFFM:507900743}    Treatments at the Time of Hospital Discharge:   Respiratory Treatments: ***  Oxygen Therapy:  {Therapy; copd oxygen:01807}  Ventilator:    {MH CC Vent WBPB:414150322}    Rehab Therapies: {THERAPEUTIC INTERVENTION:2954357565}  Weight Bearing Status/Restrictions: {CASTRO GIRARD Weight Bearin}  Other Medical Equipment (for information only, NOT a DME order):  {EQUIPMENT:426114052}  Other Treatments: ***    Patient's personal belongings (please select all that are sent with patient):  {CHP DME Belongings:019536180}    RN SIGNATURE:  {Esignature:300358018}    CASE MANAGEMENT/SOCIAL WORK SECTION    Inpatient Status Date: ***    Readmission Risk Assessment Score:  Readmission Risk              Risk of Unplanned Readmission:  0           Discharging to Facility/ Agency   Name:   Address:  Phone:  Fax:    Dialysis Facility (if applicable)   Name:  Address:  Dialysis Schedule:  Phone:  Fax:    / signature: {Esignature:804794643}    PHYSICIAN SECTION    Prognosis: {Prognosis:2499406357}    Condition at Discharge: [address] [place] [address] Patient Condition:996215453}    Rehab Potential (if transferring to Rehab): {Prognosis:4668108629}    Recommended Labs or Other Treatments After Discharge: ***    Physician Certification: I certify the above information and transfer of [place]  is necessary for the continuing treatment of the diagnosis listed and that he requires {Admit to Appropriate Level of Care:48342} for {GREATER/LESS:669281781} 30 days.      Update Admission H&P: {CHP DME Changes in WFHYV:046840083}    PHYSICIAN SIGNATURE:  {Esignature:795005868}

## 2023-02-18 LAB
EKG P AXIS: NORMAL DEGREES
EKG P-R INTERVAL: NORMAL MS
EKG Q-T INTERVAL: 394 MS
EKG QRS DURATION: 98 MS
EKG QTC CALCULATION (BAZETT): 401 MS
EKG T AXIS: 61 DEGREES

## 2023-02-18 PROCEDURE — 93010 ELECTROCARDIOGRAM REPORT: CPT | Performed by: INTERNAL MEDICINE

## 2023-03-02 ENCOUNTER — TELEPHONE (OUTPATIENT)
Dept: HEMATOLOGY | Age: 68
End: 2023-03-02

## 2023-03-02 ENCOUNTER — OFFICE VISIT (OUTPATIENT)
Dept: CARDIOLOGY CLINIC | Age: 68
End: 2023-03-02
Payer: MEDICARE

## 2023-03-02 VITALS
HEIGHT: 66 IN | DIASTOLIC BLOOD PRESSURE: 60 MMHG | SYSTOLIC BLOOD PRESSURE: 116 MMHG | WEIGHT: 164 LBS | HEART RATE: 69 BPM | BODY MASS INDEX: 26.36 KG/M2

## 2023-03-02 DIAGNOSIS — Z95.0 PACEMAKER: ICD-10-CM

## 2023-03-02 DIAGNOSIS — I49.5 SINOATRIAL NODE DYSFUNCTION (HCC): ICD-10-CM

## 2023-03-02 DIAGNOSIS — I10 ESSENTIAL HYPERTENSION: ICD-10-CM

## 2023-03-02 DIAGNOSIS — Z95.5 H/O HEART ARTERY STENT: ICD-10-CM

## 2023-03-02 DIAGNOSIS — E78.2 MIXED HYPERLIPIDEMIA: ICD-10-CM

## 2023-03-02 DIAGNOSIS — I47.1 PSVT (PAROXYSMAL SUPRAVENTRICULAR TACHYCARDIA) (HCC): ICD-10-CM

## 2023-03-02 DIAGNOSIS — I49.5 SA NODE DYSFUNCTION (HCC): Primary | ICD-10-CM

## 2023-03-02 PROCEDURE — 3017F COLORECTAL CA SCREEN DOC REV: CPT | Performed by: INTERNAL MEDICINE

## 2023-03-02 PROCEDURE — 1123F ACP DISCUSS/DSCN MKR DOCD: CPT | Performed by: INTERNAL MEDICINE

## 2023-03-02 PROCEDURE — 3074F SYST BP LT 130 MM HG: CPT | Performed by: INTERNAL MEDICINE

## 2023-03-02 PROCEDURE — G8417 CALC BMI ABV UP PARAM F/U: HCPCS | Performed by: INTERNAL MEDICINE

## 2023-03-02 PROCEDURE — 3078F DIAST BP <80 MM HG: CPT | Performed by: INTERNAL MEDICINE

## 2023-03-02 PROCEDURE — 99213 OFFICE O/P EST LOW 20 MIN: CPT | Performed by: INTERNAL MEDICINE

## 2023-03-02 PROCEDURE — 4004F PT TOBACCO SCREEN RCVD TLK: CPT | Performed by: INTERNAL MEDICINE

## 2023-03-02 PROCEDURE — G8484 FLU IMMUNIZE NO ADMIN: HCPCS | Performed by: INTERNAL MEDICINE

## 2023-03-02 PROCEDURE — G8427 DOCREV CUR MEDS BY ELIG CLIN: HCPCS | Performed by: INTERNAL MEDICINE

## 2023-03-02 ASSESSMENT — ENCOUNTER SYMPTOMS
VOMITING: 0
RESPIRATORY NEGATIVE: 1
NAUSEA: 0
GASTROINTESTINAL NEGATIVE: 1
EYES NEGATIVE: 1
DIARRHEA: 0
SHORTNESS OF BREATH: 0

## 2023-03-02 NOTE — PROGRESS NOTES
[facility]                            Date:  3/2/2023  Patient: Paulo Ruggiero  Age:  76 y.o., 1955      Reason for evaluation:         SUBJECTIVE:    Returns today for follow-up assessment follow-up for coronary artery disease pacemaker hyperlipidemia hypertension previous stent. He is dealing with cancer and I believe is undergoing chemotherapy. He still smoking. Denies anginal chest pain or limiting dyspnea no cardiovascular complaints. Blood pressure 116/60 heart rate 69. Nuclear medicine perfusion study 2/17/2023 ejection fraction 54% normal perfusion study. No other complaints or issues reported. Cardiac catheterization 215 1890% proximal LAD with stent placement LAD proximal and diagonal.  EF 45%. Review of Systems   Constitutional: Negative. Negative for chills, fever and unexpected weight change. HENT: Negative. Eyes: Negative. Respiratory: Negative. Negative for shortness of breath. Cardiovascular: Negative. Negative for chest pain. Gastrointestinal: Negative. Negative for diarrhea, nausea and vomiting. Endocrine: Negative. Genitourinary: Negative. Musculoskeletal: Negative. Skin: Negative. Neurological: Negative. All other systems reviewed and are negative. OBJECTIVE:     /60   Pulse 69   Ht 5' 6\" (1.676 m)   Wt 164 lb (74.4 kg)   BMI 26.47 kg/m²     Labs:   CBC: No results for input(s): WBC, HGB, HCT, PLT in the last 72 hours. BMP:No results for input(s): NA, K, CO2, BUN, CREATININE, LABGLOM, GLUCOSE in the last 72 hours. BNP: No results for input(s): BNP in the last 72 hours. PT/INR: No results for input(s): PROTIME, INR in the last 72 hours. APTT:No results for input(s): APTT in the last 72 hours. CARDIAC ENZYMES:No results for input(s): CKTOTAL, CKMB, CKMBINDEX, TROPONINI in the last 72 hours.   FASTING LIPID PANEL:  Lab Results   Component Value Date/Time    HDL 22 02/27/2018 05:12 AM    LDLDIRECT 105 02/23/2013 02:12 AM    LDLCALC 40 02/27/2018 05:12 AM    TRIG 119 02/27/2018 05:12 AM     LIVER PROFILE:No results for input(s): AST, ALT, LABALBU in the last 72 hours.         Past Medical History:   Diagnosis Date    Bradycardia     3/5/13  loop recorder    Cancer (HCC)     Carotid artery stenosis     Chronic kidney disease     Cigarette nicotine dependence in remission 2/21/2018    Hypoglycemia     Mixed hyperlipidemia 2/21/2018    Near syncope     Osteoarthritis     Pacemaker 05/22/2013    loop recorder removed    Recovering alcoholic ([address]     S/P carotid endarterectomy     right internal carotid    Sinoatrial node dysfunction ([facility] [address] 2/15/2018    3/5/13  loop recorder 5/22/2013  Dual chamber PPM    Tobacco abuse      Past Surgical History:   Procedure Laterality Date    CORONARY ANGIOPLASTY WITH STENT PLACEMENT  02/15/2018    SP prox LAD and Osteal diagonal    CORONARY ANGIOPLASTY WITH STENT PLACEMENT  02/26/2018    SP to osteal    PACEMAKER INSERTION  5/22/2013    VASCULAR SURGERY  2-27-13 [facility]    Right Carotid endarterectomy, eversion technique with completion duplex US     Family History   Problem Relation Age of Onset    Diabetes Mother     Heart Disease Father     High Blood Pressure Father     Diabetes Father     Stroke Father      Allergies   Allergen Reactions    Codeine Anaphylaxis     Current Outpatient Medications   Medication Sig Dispense Refill    rosuvastatin (CRESTOR) 40 MG tablet Take 40 mg by mouth at bedtime      escitalopram (LEXAPRO) 10 MG tablet Take 10 mg by mouth daily      vitamin B-12 (CYANOCOBALAMIN) 1000 MCG tablet Take 1,000 mcg by mouth daily      vitamin D (ERGOCALCIFEROL) 1.25 MG (36089 UT) CAPS capsule Take 50,000 Units by mouth once a week      amLODIPine (NORVASC) 5 MG tablet Take 2.5 mg by mouth 1/2 in am 1/2 in pm      carvedilol (COREG) 25 MG tablet Take 25 mg by mouth 2 times daily (with meals)       ZINC PO Take by mouth      Multiple Vitamins-Minerals (THERAPEUTIC MULTIVITAMIN-MINERALS) tablet Take 1 tablet by mouth daily      aspirin 81 MG chewable tablet Take 1 tablet by mouth daily 30 tablet 3     No current facility-administered medications for this visit. Social History     Socioeconomic History    Marital status:      Spouse name: Not on file    Number of children: Not on file    Years of education: Not on file    Highest education level: Not on file   Occupational History    Not on file   Tobacco Use    Smoking status: Every Day     Packs/day: 1.00     Types: Cigarettes    Smokeless tobacco: Former     Types: Chew   Vaping Use    Vaping Use: Never used   Substance and Sexual Activity    Alcohol use: No     Alcohol/week: 0.0 standard drinks    Drug use: No     Comment: Past Use    Sexual activity: Never     Partners: Female   Other Topics Concern    Not on file   Social History Narrative    Not on file     Social Determinants of Health     Financial Resource Strain: Not on file   Food Insecurity: Not on file   Transportation Needs: Not on file   Physical Activity: Not on file   Stress: Not on file   Social Connections: Not on file   Intimate Partner Violence: Not on file   Housing Stability: Not on file       Physical Examination:  /60   Pulse 69   Ht 5' 6\" (1.676 m)   Wt 164 lb (74.4 kg)   BMI 26.47 kg/m²   Physical Exam  Vitals reviewed. Constitutional:       Appearance: He is well-developed. Neck:      Vascular: No carotid bruit or JVD. Cardiovascular:      Rate and Rhythm: Normal rate and regular rhythm. Heart sounds: Normal heart sounds. No murmur heard. No friction rub. No gallop. Pulmonary:      Effort: Pulmonary effort is normal. No respiratory distress. Breath sounds: Normal breath sounds. No wheezing or rales. Abdominal:      General: There is no distension. Tenderness: There is no abdominal tenderness. Lymphadenopathy:      Cervical: No cervical adenopathy. Skin:     General: Skin is warm and dry. ASSESSMENT:     Diagnosis Orders   1. SA node dysfunction ([address]        2. Pacemaker        3. Mixed hyperlipidemia        4. Essential hypertension        5. PSVT (paroxysmal supraventricular tachycardia) ([address]        6. H/O heart artery stent        7. Sinoatrial node dysfunction (HCC)            PLAN:  No orders of the defined types were placed in this encounter. No orders of the defined types were placed in this encounter. Continue present medications  Encouraged the patient to quit smoking  Recommend follow-up assessment in 6 months    Return in about 6 months (around 9/2/2023) for return to Dr. Ronan Pepper only. Artemio Pack MD 3/2/2023 2:10 PM [facility]      Thisdictation was generated by voice recognition computer software. Although all attempts are made to edit the dictation for accuracy, there may be errors in the transcription that are not intended.

## 2023-03-08 DIAGNOSIS — N28.9 LESION OF URETER: ICD-10-CM

## 2023-03-08 DIAGNOSIS — R91.1 LESION OF LUNG: ICD-10-CM

## 2023-03-08 DIAGNOSIS — C22.0 LIVER CELL CARCINOMA (HCC): Primary | ICD-10-CM

## 2023-03-08 DIAGNOSIS — K76.9 LIVER LESION: ICD-10-CM

## 2023-03-08 NOTE — PROGRESS NOTES
MEDICAL ONCOLOGY CONSULTATION    Pt Name: Ariel Feldman  MRN: [id number]  YOB: 1955  Date of evaluation: 3/9/2023    REASON FOR CONSULTATION:  Hepatocellular carcinoma  REQUESTING PHYSICIAN: Dr Randy Corbett/[facility]    History Obtained From:  patient and old medical records    HISTORY OF PRESENT ILLNESS:    Diagnosis  Hepatocellular carcinoma, 2019  Chronic hepatitis C, July 6750  Alcoholic cirrhosis, July 7108    Treatment Summary  11/21/19 Epclusa x12 weeks for Hepatitis C  2020 Sorafenib/Nexavar initially took 800mg daily, but couldn't tolerate. Dose was gradually decreased until patient could tolerate 200mg daily  2/27/20 Y-90 treatment at [facility]  March 2021 Sorafenib/Nexavar discontinued due to nephrotoxicity    Cancer History  Ariel Feldman seen by me on 3/9/2023. He was referred by his primary care provider primary care provider Zain Rosales. 4/29/19 [facility] labs: Hepatitis C-REACTIVE, Hepatitis B-negative  7/2/19 US abdomen ([facility]): The liver is normal in appearance without evidence of intrahepatic biliary ductal dilatation. Partial obscuration of the pancreas, limiting evaluation. Otherwise, negative right upper quadrant ultrasound. 11/21/19 Epclusa x12 weeks for Hepatitis C  1/8/20 CT abd ([facility]): There is a poorly marginated area/mass located posteriorly in the segment 2 of the liver adjacent and anterior to the caudate lobe of the liver, measuring 3.5 x 2.8 x 3 cm. The CT density of this mass is less than liver density and it enhances more slowly than the liver parenchyma and attains a high density than the liver parenchyma in the venous phase with subsequent early washout compared to the liver parenchyma. There is another similar poorly marginated area located anteriorly in the segment 4 of the liver measuring 4 cm x 3.8 x 2.5 cm which has similar characteristics. The remaining liver is unremarkable.  Spleen is normal.   1/10/20 [facility] labs: .8  2020 Sorafenib/Nexavar initially took 800mg daily, but couldn't tolerate. Dose was gradually decreased until patient could tolerate 200mg daily  2/27/20 Y-90 treatment at [facility]  12/27/20 [facility] labs: AFP 2.7  5/20/20 CT abd ([facility]): There is a second area of contrast enhancement surrounding a low-density nodule located posteriorly in the segment 2 of the liver. The central low-density nodule measures 1.5 x 0.7 x 0.7 cm. There is a surrounding area of enhancement/blush that measures 2.9 x 3.1 x 2.2 cm. This is also noted in the venous phase And is ready of ill-defined enhancement is seen located laterally in the segment 4 of the liver extending into the gallbladder fossa. No sentinel nodule or scar? . All the above-mentioned lesions are not seen in the unenhanced images are the arterial phase images. The spleen appears normal.   March 2021 Sorafenib/Nexavar discontinued due to nephrotoxicity  5/3/21 Evaluation by Dr [facility]int[facility]:   5/3/21 [facility] labs: AFP 3.3  8/16/21 [facility] labs: AFP 2.9  8/31/20 CT abd/pelvis ([facility]):  The decrease in size of the liver nodules as detailed above. An ill-defined low-density in the spleen probably represent the phase of parenchymal enhancement/splenic circulation. A mild splenomegaly. Bilateral femoral head osteonecrosis. 10/20/20 CT abd/pelvis ([facility]) : Continued decrease in size of 4 mm hepatic dome and 10 mm segment 2 treated liver lesions. No evidence of residual arterial enhancement. No new or enlarging liver lesion. Findings most compatible with LR-TR nonviable. Liver is cirrhotic in morphology. Aneurysmal dilatation of the infrarenal abdominal aorta measuring 3 cm diameter. Other chronic vascular findings as above. 12/23/20 CT abd/pelvis ([facility]):  The comparison is made with the previous study dated 10/20/2020. A hypodense free fluid in the enhancing small lesion in the dome of the liver, at the junction of the segment 2 and 8, measures 8.5 mm in diameter.  It measured 12 mm in the same dimension and same level in the previous study. Small low-density lesion in the posterior margin of segment 2 now measures 7.2 mm. It previously measured 10 mm in the same dimension and same level. There is a mild surrounding contrast enhancement. Both lesions are only visualized in the delayed arterial/early portal venous phase. These are not visualized in the unenhanced, pulmonary arterial or venous phase images. The remaining liver is moderately lobulated and nodular suggesting cirrhosis. No change in the previous study. The spleen is normal with several small granulomas. 1/22/21 US renal complete ([facility]): A negative renal sonography. 4/19/21 CT abd/pelvis ([facility]): Scalloped margins the liver suggesting underlying hepatic cirrhosis. 2 stable hypodense peripherally enhancing lesions as described above. No new or increasing size liver lesion identified. No evidence of lymphatic or distant metastasis. Diffuse bladder wall thickening may in part relate to incomplete distention. Correlation for cystitis recommended. Avascular necrosis of the bilateral femoral heads. Diffuse atherosclerotic changes with similar aneurysmal distention of the right common and thrombosed left internal iliac arteries. 11/2/21 CT abd/pelvis [facility]): A tiny low-density nodule in the right lobe of the liver have significantly decreased in size since the previous study. It measures 6.5 mm in diameter in the present study. It was 11 mm in the previous study. Persistent moderate thickening of the moderately well distended urinary bladder. This may represent chronic cystitis or chronic partial outlet obstruction. Severe atheromatous changes of the abdominal aorta and iliac arteries with ectasia of the distal abdominal aorta and both iliac arteries. A persistent atelectatic thrombosed left internal iliac artery. Avascular necrosis of femoral head bilaterally. 8/2/22 US renal bilateral [facility]): Normal ultrasound appearance of the kidneys and bladder.  Prostatomegaly, probable BPH. Clinical correlation is recommended. 9/7/22 CT abd/pelvis ([facility]): Heterogeneous density of a multinodular liver with no areas of abnormal contrast-enhancement. Faint areas of hypodensity noted in both liver lobes, largest (2.7 x 2.8 cm) in segment 6, another similar area in segment four (1.7 x 2.1 cm) and a third 1  measuring up to 1.1 cm in segment 2. Findings are indeterminate as no areas of hyperenhancement are demonstrated. Neoplastic process cannot be completely ruled out. 1/17/23 CT chest [facility]): There is an anterior mediastinal mass. I feel there is anterior chest wall invasion at the level of the left anterior second/third   intercostal space. There is asymmetric induration of the subpleural fat at this level. The left internal mammary artery is displaced laterally. This should be considered a neoplasm until otherwise proven. Focus of irregular groundglass density within the left upper lobe with small areas of soft tissue component. FINDINGS concerning for a potential adenocarcinoma of the left upper lobe. PET imaging may be helpful for further characterization of this particular finding. No additional suspicious lung lesions are identified. Bibasilar atelectasis is present. There is what I suspect to represent a small intrafissural node within the minor fissure on the right. Atheromatous calcification of the thoracic aorta and proximal great vessels with mild stenosis in the proximal great vessels. No dissection. There is mild aneurysmal dilatation of the descending thoracic aorta with irregular plaquing. Heavy coronary calcifications are demonstrated. The liver is cirrhotic in morphology. There is heterogeneous enhancement within the left lobe of the liver not imaged in its entirety. This could represent a multifocal hepatoma given the history of elevated AFP and cirrhotic morphology.  There is also hydronephrosis of the right kidney with significant obstructive uropathy with asymmetric enhancement of the right kidney in comparison with the left. 1/17/23 CT abd/pelvis [facility]): Moderate right-sided hydronephrosis with an enhancing mass in the mid to distal right ureter measuring approximately 2.0 x 1.4 cm, concerning for transitional cell neoplasm. Urology consult is recommended. Findings compatible with cirrhosis, there is an enhancing mass in segment 6 of the liver, measuring 5.6 x 4.1 cm and numerous additional enhancing nodules throughout the left lobe of liver. Progression of known HCC is not excluded, follow-up MRI of the abdomen with and without contrast is recommended. Bilateral hip AVN, without femoral head collapse. 1/25/23 Bone scan [facility]): No evidence of metastatic bone disease. Areas of degenerative/arthritic uptake, as described. Retention of tracer within the right renal collecting system and right ureter would be consistent with findings on recent CT of an obstructing right ureteral lesion. 2/10/23 CT scans reviewed by Dr Joleen Jenkins of [facility] who recommended biopsy of lung nodule and recommends to start Durvalumab and Trememlimumab.  3/9/2023-she was first seen by me. Lengthy discussion with him about his current diagnosis, prognosis, treatment options to include immunotherapy. I discussed with him the possible side effects of this treatment. In general, I told him that the treatment for immunotherapy is better tolerable than sorafenib. He declined palliative anticancer therapy at this time. He would like to enroll with hospice. Hospice referral was placed today.     Past Medical History:    Past Medical History:   Diagnosis Date    Bradycardia     3/5/13  loop recorder    Cancer ([facility] [address]     Carotid artery stenosis     Chronic kidney disease     Cigarette nicotine dependence in remission 2/21/2018    Hypoglycemia     Mixed hyperlipidemia 2/21/2018    Near syncope     Osteoarthritis     Pacemaker 05/22/2013    loop recorder removed    Recovering alcoholic ([address]     S/P carotid endarterectomy     right internal carotid    Sinoatrial node dysfunction ([facility] [address] 2/15/2018    3/5/13  loop recorder 5/22/2013  Dual chamber PPM    Tobacco abuse        Past Surgical History:    Past Surgical History:   Procedure Laterality Date    CORONARY ANGIOPLASTY WITH STENT PLACEMENT  02/15/2018    SP prox LAD and Osteal diagonal    CORONARY ANGIOPLASTY WITH STENT PLACEMENT  02/26/2018    SP to osteal    PACEMAKER INSERTION  5/22/2013    VASCULAR SURGERY  2-27-13 [facility]    Right Carotid endarterectomy, eversion technique with completion duplex US       Social History:    Marital status:   Smoking status:Currently; 1 pack daily for 62 years  ETOH status:No  Resides: [place], [place]    Family History:   Family History   Problem Relation Age of Onset    Diabetes Mother     Heart Disease Father     High Blood Pressure Father     Diabetes Father     Stroke Father     Cancer Brother        Current Hospital Medications:    Current Outpatient Medications   Medication Sig Dispense Refill    rosuvastatin (CRESTOR) 40 MG tablet Take 40 mg by mouth at bedtime      escitalopram (LEXAPRO) 10 MG tablet Take 10 mg by mouth daily      vitamin B-12 (CYANOCOBALAMIN) 1000 MCG tablet Take 1,000 mcg by mouth daily      vitamin D (ERGOCALCIFEROL) 1.25 MG (23498 UT) CAPS capsule Take 50,000 Units by mouth once a week      amLODIPine (NORVASC) 5 MG tablet Take 2.5 mg by mouth 1/2 in am 1/2 in pm      carvedilol (COREG) 25 MG tablet Take 25 mg by mouth 2 times daily (with meals)       ZINC PO Take by mouth      Multiple Vitamins-Minerals (THERAPEUTIC MULTIVITAMIN-MINERALS) tablet Take 1 tablet by mouth daily      aspirin 81 MG chewable tablet Take 1 tablet by mouth daily 30 tablet 3     Current Facility-Administered Medications   Medication Dose Route Frequency Provider Last Rate Last Admin    megestrol (MEGACE) 40 MG/ML suspension 400 mg  400 mg Oral Daily Lachelle Garcia MD           Allergies:    Allergies   Allergen Reactions Codeine Anaphylaxis         Subjective   REVIEW OF SYSTEMS:   CONSTITUTIONAL: no fever, no night sweats, fatigue;  HEENT: no blurring of vision, no double vision, no hearing difficulty, no tinnitus, no ulceration, no dysplasia, no epistaxis;  LUNGS: no cough, no hemoptysis, no wheeze,   shortness of breath;  CARDIOVASCULAR: no palpitation, no chest pain, shortness of breath;  GI: abdominal pain, no nausea, no vomiting, no diarrhea, no constipation;  BON: no dysuria, no hematuria, no frequency or urgency, no nephrolithiasis;  MUSCULOSKELETAL: no joint pain, no swelling, no stiffness;  ENDOCRINE: no polyuria, no polydipsia, no cold or heat intolerance;  HEMATOLOGY: no easy bruising or bleeding, no history of clotting disorder;  DERMATOLOGY: no skin rash, no eczema, no pruritus;  NEUROLOGY: no syncope, no seizures, no numbness or tingling of hands, no numbness or tingling of feet, no paresis;    Objective   /80   Pulse 65   Ht 5' 6\" (1.676 m)   Wt 158 lb (71.7 kg)   SpO2 99%   BMI 25.50 kg/m²     PHYSICAL EXAM:  CONSTITUTIONAL: Alert, appropriate, no acute distress  EYES: Non icteric, EOM intact, pupils equal round   ENT: Mucus membranes moist, no oral pharyngeal lesions, external inspection of ears and nose are normal  NECK: Supple, no masses. No palpable thyroid mass  CHEST/LUNGS: CTA bilaterally, normal respiratory effort   CARDIOVASCULAR: RRR, no murmurs. No lower extremity edema  ABDOMEN: soft non-tender, active bowel sounds, no HSM. No palpable masses  EXTREMITIES: warm, full ROM in all 4 extremities, no focal weakness. SKIN: warm, dry with no rashes or lesions  LYMPH: No cervical, clavicular, axillary, or inguinal lymphadenopathy  NEUROLOGIC: follows commands, non focal   PSYCH: mood and affect appropriate.   Alert and oriented to time, place, person      LABORATORY RESULTS REVIEWED/ANALYZED BY ME:  3/9/23 CBC  WBC 13.10  HGB 10.8    Neut 10.37    RADIOLOGY STUDIES REVIEWED BY ME:  As above      ASSESSMENT:    Orders Placed This Encounter   Procedures    External Referral To Hospice     Referral Priority:   Routine     Referral Type:   Eval and Treat     Referral Reason:   Specialty Services Required     Requested Specialty:   Hospice and Palliative Medicine     Number of Visits Requested:   1        Deandre Jack was seen today for new patient. Diagnoses and all orders for this visit:    Hepatocellular carcinoma [facility])  -     External Referral To Hospice    Care plan discussed with patient    Anorexia    Poor prognosis    Other orders  -     megestrol (MEGACE) 40 MG/ML suspension 400 mg       Hepatocellular carcinoma,   -1/10/20 [facility] labs: .8  -2020 Nexavar initially took 800mg daily, but couldn't tolerate. Dose was gradually decreased until patient could tolerate 200mg daily  -2/27/20 Y-90 treatment at [facility]  -12/27/20 [facility] labs: AFP 2.7  -March 2021 Sorafenib discontinued due to nephrotoxicity  -1/17/23 CT chest [facility]): There is an anterior mediastinal mass. I feel there is anterior chest wall invasion at the level of the left anterior second/third   intercostal space. There is asymmetric induration of the subpleural fat at this level. The left internal mammary artery is displaced laterally. This should be considered a neoplasm until otherwise proven. Focus of irregular groundglass density within the left upper lobe with small areas of soft tissue component. FINDINGS concerning for a potential adenocarcinoma of the left upper lobe. PET imaging may be helpful for further characterization of this particular finding. No additional suspicious lung lesions are identified. Bibasilar atelectasis is present. There is what I suspect to represent a small intrafissural node within the minor fissure on the right. Atheromatous calcification of the thoracic aorta and proximal great vessels with mild stenosis in the proximal great vessels. No dissection.  There is mild aneurysmal dilatation of the descending thoracic aorta with irregular plaquing. Heavy coronary calcifications are demonstrated. The liver is cirrhotic in morphology. There is heterogeneous enhancement within the left lobe of the liver not imaged in its entirety. This could represent a multifocal hepatoma given the history of elevated AFP and cirrhotic morphology. There is also hydronephrosis of the right kidney with significant obstructive uropathy with asymmetric enhancement of the right kidney in comparison with the left. -1/17/23 CT abd/pelvis [facility]): Moderate right-sided hydronephrosis with an enhancing mass in the mid to distal right ureter measuring approximately 2.0 x 1.4 cm, concerning for transitional cell neoplasm. Urology consult is recommended. Findings compatible with cirrhosis, there is an enhancing mass in segment 6 of the liver, measuring 5.6 x 4.1 cm and numerous additional enhancing nodules throughout the left lobe of liver. Progression of known HCC is not excluded, follow-up MRI of the abdomen with and without contrast is recommended. Bilateral hip AVN, without femoral head collapse.   -1/25/23 Bone scan [facility]): No evidence of metastatic bone disease. Areas of degenerative/arthritic uptake, as described. Retention of tracer within the right renal collecting system and right ureter would be consistent with findings on recent CT of an obstructing right ureteral lesion.   -2/10/23 CT scans reviewed by Dr Juana Tanner of [facility] who recommended biopsy of lung nodule and recommends to start Durvalumab and Trememlimumab.  3/9/2023-she was first seen by me. Lengthy discussion with him about his current diagnosis, prognosis, treatment options to include immunotherapy. I discussed with him the possible side effects of this treatment. In general, I told him that the treatment for immunotherapy is better tolerable than sorafenib. He declined palliative anticancer therapy at this time. He would like to enroll with hospice.   Hospice referral was placed today. Chronic hepatitis C, July 2019  -4/29/19 [facility] labs: Hepatitis C-REACTIVE, Hepatitis B-negative  -11/21/19 Epclusa x12 weeks for Hepatitis C    Alcoholic cirrhosis, July 3295    Anorexia- Megace      PLAN:  RTC with MD  Referral to Hospice care  Recommend Megace 400 mg po BID-script sent  Patient declined treatment options at this time      I, Lilly Vazquez am pre-charting as a registered nurse for Leatha Carreno MD. Electronically signed by Lilly Vazquez RN on 3/9/2023 at 9:35 AM CST. Dimitri Ridley am scribing for Leatha Carreno MD. Electronically signed by Bobby Ovalle RN on 3/9/2023 at 2:04 PM CST. I, Dr Miesha Fernandes, personally performed the services described in this documentation as scribed by Bobby Ovalle RN in my presence and is both accurate and complete. I have seen, examined and reviewed this patient medication list, appropriate labs and imaging studies. I reviewed relevant medical records and others physicians notes. I discussed the plans of care with the patient. I answered all the questions to the patients satisfaction. I have also reviewed the chief complaint (CC) and part of the history (History of Present Illness (HPI), Past Family Social History [facility]), or Review of Systems (ROS) and made changes when appropriated. (Please note that portions of this note were completed with a voice recognition program. Efforts were made to edit the dictations but occasionally words are mis-transcribed. )Electronically signed by Leatha Carreno MD on 3/9/2023 at 3:01 PM         The total time, 48 min I spent to see the patient today includes at least one or more of the following: preparing to see the patient by reviewing prior tests, prior notes or other relevant information, performing appropriate independent examination and evaluation, counseling, ordering of medications, documenting clinic information in the electronic medical record or other health records, independently interpreting results of tests, managing test results and communicating the results to the patient/family or caregiver.

## 2023-03-09 ENCOUNTER — HOSPITAL ENCOUNTER (OUTPATIENT)
Dept: INFUSION THERAPY | Age: 68
Discharge: HOME | End: 2023-03-09
Payer: MEDICARE

## 2023-03-09 ENCOUNTER — OFFICE VISIT (OUTPATIENT)
Dept: HEMATOLOGY | Age: 68
End: 2023-03-09
Payer: MEDICARE

## 2023-03-09 VITALS
BODY MASS INDEX: 25.39 KG/M2 | HEIGHT: 66 IN | WEIGHT: 158 LBS | SYSTOLIC BLOOD PRESSURE: 124 MMHG | DIASTOLIC BLOOD PRESSURE: 80 MMHG | OXYGEN SATURATION: 99 % | HEART RATE: 65 BPM

## 2023-03-09 DIAGNOSIS — R63.0 ANOREXIA: ICD-10-CM

## 2023-03-09 DIAGNOSIS — Z71.89 CARE PLAN DISCUSSED WITH PATIENT: ICD-10-CM

## 2023-03-09 DIAGNOSIS — C22.0 LIVER CELL CARCINOMA (HCC): ICD-10-CM

## 2023-03-09 DIAGNOSIS — K76.9 LIVER LESION: ICD-10-CM

## 2023-03-09 DIAGNOSIS — Z78.9 POOR PROGNOSIS: ICD-10-CM

## 2023-03-09 DIAGNOSIS — R91.1 LESION OF LUNG: ICD-10-CM

## 2023-03-09 DIAGNOSIS — C22.0 HEPATOCELLULAR CARCINOMA (HCC): Primary | ICD-10-CM

## 2023-03-09 DIAGNOSIS — N28.9 LESION OF URETER: ICD-10-CM

## 2023-03-09 LAB
BASOPHILS ABSOLUTE: 0.08 K/UL (ref 0.01–0.08)
BASOPHILS RELATIVE PERCENT: 0.6 % (ref 0.1–1.2)
EOSINOPHILS ABSOLUTE: 0.47 K/UL (ref 0.04–0.54)
EOSINOPHILS RELATIVE PERCENT: 3.6 % (ref 0.7–7)
HCT VFR BLD CALC: 34.6 % (ref 40.1–51)
HEMOGLOBIN: 10.8 G/DL (ref 13.7–17.5)
LYMPHOCYTES ABSOLUTE: 0.96 K/UL (ref 1.18–3.74)
LYMPHOCYTES RELATIVE PERCENT: 7.3 % (ref 19.3–53.1)
MCH RBC QN AUTO: 28.8 PG (ref 25.7–32.2)
MCHC RBC AUTO-ENTMCNC: 31.2 G/DL (ref 32.3–36.5)
MCV RBC AUTO: 92.3 FL (ref 79–92.2)
MONOCYTES ABSOLUTE: 1.19 K/UL (ref 0.24–0.82)
MONOCYTES RELATIVE PERCENT: 9.1 % (ref 4.7–12.5)
NEUTROPHILS ABSOLUTE: 10.37 K/UL (ref 1.56–6.13)
NEUTROPHILS RELATIVE PERCENT: 79.2 % (ref 34–71.1)
PDW BLD-RTO: 14.1 % (ref 11.6–14.4)
PLATELET # BLD: 137 K/UL (ref 163–337)
PMV BLD AUTO: 9.9 FL (ref 7.4–10.4)
RBC # BLD: 3.75 M/UL (ref 4.63–6.08)
WBC # BLD: 13.1 K/UL (ref 4.23–9.07)

## 2023-03-09 PROCEDURE — 1123F ACP DISCUSS/DSCN MKR DOCD: CPT | Performed by: INTERNAL MEDICINE

## 2023-03-09 PROCEDURE — 36415 COLL VENOUS BLD VENIPUNCTURE: CPT

## 2023-03-09 PROCEDURE — 99204 OFFICE O/P NEW MOD 45 MIN: CPT | Performed by: INTERNAL MEDICINE

## 2023-03-09 PROCEDURE — G8484 FLU IMMUNIZE NO ADMIN: HCPCS | Performed by: INTERNAL MEDICINE

## 2023-03-09 PROCEDURE — G8427 DOCREV CUR MEDS BY ELIG CLIN: HCPCS | Performed by: INTERNAL MEDICINE

## 2023-03-09 PROCEDURE — 99202 OFFICE O/P NEW SF 15 MIN: CPT

## 2023-03-09 PROCEDURE — 4004F PT TOBACCO SCREEN RCVD TLK: CPT | Performed by: INTERNAL MEDICINE

## 2023-03-09 PROCEDURE — 85025 COMPLETE CBC W/AUTO DIFF WBC: CPT

## 2023-03-09 PROCEDURE — G8417 CALC BMI ABV UP PARAM F/U: HCPCS | Performed by: INTERNAL MEDICINE

## 2023-03-09 PROCEDURE — 3017F COLORECTAL CA SCREEN DOC REV: CPT | Performed by: INTERNAL MEDICINE

## 2023-03-09 RX ORDER — MEGESTROL ACETATE 40 MG/ML
400 SUSPENSION ORAL DAILY
Status: SHIPPED | OUTPATIENT
Start: 2023-03-09

## 2023-03-09 ASSESSMENT — PROMIS GLOBAL HEALTH SCALE
IN GENERAL, PLEASE RATE HOW WELL YOU CARRY OUT YOUR USUAL SOCIAL ACTIVITIES (INCLUDES ACTIVITIES AT HOME, AT WORK, AND IN YOUR COMMUNITY, AND RESPONSIBILITIES AS A PARENT, CHILD, SPOUSE, EMPLOYEE, FRIEND, ETC) [ON A SCALE OF 1 (POOR) TO 5 (EXCELLENT)]?: 5
IN THE PAST 7 DAYS, HOW OFTEN HAVE YOU BEEN BOTHERED BY EMOTIONAL PROBLEMS, SUCH AS FEELING ANXIOUS, DEPRESSED, OR IRRITABLE [ON A SCALE FROM 1 (NEVER) TO 5 (ALWAYS)]?: 2
SUM OF RESPONSES TO QUESTIONS 3, 6, 7, & 8: 11
IN GENERAL, WOULD YOU SAY YOUR HEALTH IS...[ON A SCALE OF 1 (POOR) TO 5 (EXCELLENT)]: 3
SUM OF RESPONSES TO QUESTIONS 2, 4, 5, & 10: 16
IN THE PAST 7 DAYS, HOW WOULD YOU RATE YOUR PAIN ON AVERAGE [ON A SCALE FROM 0 (NO PAIN) TO 10 (WORST IMAGINABLE PAIN)]?: 0
TO WHAT EXTENT ARE YOU ABLE TO CARRY OUT YOUR EVERYDAY PHYSICAL ACTIVITIES SUCH AS WALKING, CLIMBING STAIRS, CARRYING GROCERIES, OR MOVING A CHAIR [ON A SCALE OF 1 (NOT AT ALL) TO 5 (COMPLETELY)]?: 5
IN GENERAL, HOW WOULD YOU RATE YOUR SATISFACTION WITH YOUR SOCIAL ACTIVITIES AND RELATIONSHIPS [ON A SCALE OF 1 (POOR) TO 5 (EXCELLENT)]?: 5
IN GENERAL, HOW WOULD YOU RATE YOUR PHYSICAL HEALTH [ON A SCALE OF 1 (POOR) TO 5 (EXCELLENT)]?: 4
IN GENERAL, WOULD YOU SAY YOUR QUALITY OF LIFE IS...[ON A SCALE OF 1 (POOR) TO 5 (EXCELLENT)]: 4
IN THE PAST 7 DAYS, HOW WOULD YOU RATE YOUR FATIGUE ON AVERAGE [ON A SCALE FROM 1 (NONE) TO 5 (VERY SEVERE)]?: 2
IN GENERAL, HOW WOULD YOU RATE YOUR MENTAL HEALTH, INCLUDING YOUR MOOD AND YOUR ABILITY TO THINK [ON A SCALE OF 1 (POOR) TO 5 (EXCELLENT)]?: 5

## 2023-05-18 ENCOUNTER — TELEPHONE (OUTPATIENT)
Dept: CARDIOLOGY CLINIC | Age: 68
End: 2023-05-18

## 2023-05-18 NOTE — TELEPHONE ENCOUNTER
Left vm advising patient it is time for him to send a carelink in from home monitor. He may call back for assistance if needed.

## 2023-06-22 ENCOUNTER — TELEPHONE (OUTPATIENT)
Dept: CARDIOLOGY CLINIC | Age: 68
End: 2023-06-22

## 2023-06-29 ENCOUNTER — TELEPHONE (OUTPATIENT)
Dept: CARDIOLOGY CLINIC | Age: 68
End: 2023-06-29

## 2023-07-26 ENCOUNTER — TELEPHONE (OUTPATIENT)
Dept: CARDIOLOGY CLINIC | Age: 68
End: 2023-07-26

## 2023-08-30 ENCOUNTER — TELEPHONE (OUTPATIENT)
Dept: CARDIOLOGY CLINIC | Age: 68
End: 2023-08-30

## 2023-08-30 NOTE — TELEPHONE ENCOUNTER
Called to reschedule appt on 9/7/23 with Dr. Kristy Lomas. LVM. If patient calls back please schedule for same day (if possible) with NP garrett've seen before and Tuan Sprague if they've not seen an NP before. You may also reschedule with Dr. Kristy Lomas for his next available.

## 2023-08-31 ENCOUNTER — TELEPHONE (OUTPATIENT)
Dept: CARDIOLOGY CLINIC | Age: 68
End: 2023-08-31

## 2023-08-31 NOTE — TELEPHONE ENCOUNTER
Called 2x to get 9/7/23 appt with Dr. Michael Valadez rescheduled. LVM. If pt calls back schedule them with Dr. Terry's next available or the NP they've seen.  If they have not seen a NP please schedule them with Grace Hodges

## 2023-09-05 ENCOUNTER — TELEPHONE (OUTPATIENT)
Dept: CARDIOLOGY CLINIC | Age: 68
End: 2023-09-05

## 2023-09-05 NOTE — TELEPHONE ENCOUNTER
Called 3x to reschedule 9/7/23 appt with Dr. Cammy Capellan. LVM. If patient returns call you may schedule them next available with Mara or next available with NP they've seen in the office.  If pt has not seen an NP schedule with Leonard Rodriguez if ok to see NP.

## 2023-10-20 ENCOUNTER — TELEPHONE (OUTPATIENT)
Dept: CARDIOLOGY CLINIC | Age: 68
End: 2023-10-20

## 2023-11-28 ENCOUNTER — TELEPHONE (OUTPATIENT)
Dept: CARDIOLOGY CLINIC | Age: 68
End: 2023-11-28

## 2024-01-03 ENCOUNTER — TELEPHONE (OUTPATIENT)
Dept: CARDIOLOGY CLINIC | Age: 69
End: 2024-01-03

## 2024-01-03 NOTE — TELEPHONE ENCOUNTER
I left a message for the patient regarding their CareLink that is due to be sent in for their cardiac device. Advised the patient to send in today and to call the office back with any questions that they may have.

## 2024-02-07 ENCOUNTER — TELEPHONE (OUTPATIENT)
Dept: CARDIOLOGY CLINIC | Age: 69
End: 2024-02-07

## 2024-02-28 ENCOUNTER — TELEPHONE (OUTPATIENT)
Dept: GASTROENTEROLOGY | Facility: CLINIC | Age: 69
End: 2024-02-28
Payer: MEDICARE

## 2024-03-13 ENCOUNTER — TELEPHONE (OUTPATIENT)
Dept: CARDIOLOGY CLINIC | Age: 69
End: 2024-03-13

## 2024-04-24 ENCOUNTER — TELEPHONE (OUTPATIENT)
Dept: CARDIOLOGY CLINIC | Age: 69
End: 2024-04-24

## 2024-05-28 ENCOUNTER — TELEPHONE (OUTPATIENT)
Dept: CARDIOLOGY CLINIC | Age: 69
End: 2024-05-28

## 2024-06-05 ENCOUNTER — TELEPHONE (OUTPATIENT)
Dept: CARDIOLOGY CLINIC | Age: 69
End: 2024-06-05

## 2025-06-13 NOTE — TELEPHONE ENCOUNTER
I spoke with the patient regarding their CareLink report that is due to be sent in. They verbalized an understanding and will be sending a report in as soon as they are able. English

## (undated) DEVICE — THE CHANNEL CLEANING BRUSH IS A NYLON FLEXI BRUSH ATTACHED TO A FLEXIBLE PLASTIC SHEATH DESIGNED TO SAFELY REMOVE DEBRIS FROM FLEXIBLE ENDOSCOPES.

## (undated) DEVICE — YANKAUER,BULB TIP WITH VENT: Brand: ARGYLE

## (undated) DEVICE — TBG SMPL FLTR LINE NASL 02/C02 A/ BX/100

## (undated) DEVICE — SENSR O2 OXIMAX FNGR A/ 18IN NONSTR

## (undated) DEVICE — CUFF,BP,DISP,1 TUBE,ADULT,HP: Brand: MEDLINE

## (undated) DEVICE — Device: Brand: DEFENDO AIR/WATER/SUCTION AND BIOPSY VALVE

## (undated) DEVICE — ENDOGATOR AUXILIARY WATER JET CONNECTOR: Brand: ENDOGATOR

## (undated) DEVICE — CONMED SCOPE SAVER BITE BLOCK, 20X27 MM: Brand: SCOPE SAVER

## (undated) DEVICE — MASK,OXYGEN,MED CONC,ADLT,7' TUB, UC: Brand: PENDING